# Patient Record
Sex: FEMALE | Race: WHITE | NOT HISPANIC OR LATINO | Employment: FULL TIME | URBAN - METROPOLITAN AREA
[De-identification: names, ages, dates, MRNs, and addresses within clinical notes are randomized per-mention and may not be internally consistent; named-entity substitution may affect disease eponyms.]

---

## 2017-07-19 ENCOUNTER — APPOINTMENT (OUTPATIENT)
Dept: WOMENS IMAGING | Facility: HOSPITAL | Age: 42
End: 2017-07-19

## 2017-07-19 PROCEDURE — 77063 BREAST TOMOSYNTHESIS BI: CPT | Performed by: RADIOLOGY

## 2017-07-19 PROCEDURE — 77067 SCR MAMMO BI INCL CAD: CPT | Performed by: RADIOLOGY

## 2018-07-30 ENCOUNTER — APPOINTMENT (OUTPATIENT)
Dept: WOMENS IMAGING | Facility: HOSPITAL | Age: 43
End: 2018-07-30

## 2018-07-30 PROCEDURE — 77067 SCR MAMMO BI INCL CAD: CPT | Performed by: RADIOLOGY

## 2018-07-30 PROCEDURE — 77063 BREAST TOMOSYNTHESIS BI: CPT | Performed by: RADIOLOGY

## 2019-04-11 ENCOUNTER — OFFICE (AMBULATORY)
Dept: URBAN - METROPOLITAN AREA CLINIC 64 | Facility: CLINIC | Age: 44
End: 2019-04-11

## 2019-04-11 VITALS
DIASTOLIC BLOOD PRESSURE: 78 MMHG | HEIGHT: 69 IN | SYSTOLIC BLOOD PRESSURE: 139 MMHG | HEART RATE: 119 BPM | WEIGHT: 209 LBS

## 2019-04-11 DIAGNOSIS — R16.0 HEPATOMEGALY, NOT ELSEWHERE CLASSIFIED: ICD-10-CM

## 2019-04-11 DIAGNOSIS — K74.69 OTHER CIRRHOSIS OF LIVER: ICD-10-CM

## 2019-04-11 PROCEDURE — 99203 OFFICE O/P NEW LOW 30 MIN: CPT | Performed by: INTERNAL MEDICINE

## 2019-05-07 ENCOUNTER — OFFICE (AMBULATORY)
Dept: URBAN - METROPOLITAN AREA CLINIC 64 | Facility: CLINIC | Age: 44
End: 2019-05-07

## 2019-05-07 VITALS
SYSTOLIC BLOOD PRESSURE: 122 MMHG | WEIGHT: 212 LBS | HEART RATE: 95 BPM | DIASTOLIC BLOOD PRESSURE: 73 MMHG | HEIGHT: 69 IN

## 2019-05-07 DIAGNOSIS — R94.5 ABNORMAL RESULTS OF LIVER FUNCTION STUDIES: ICD-10-CM

## 2019-05-07 DIAGNOSIS — R16.0 HEPATOMEGALY, NOT ELSEWHERE CLASSIFIED: ICD-10-CM

## 2019-05-07 DIAGNOSIS — K74.69 OTHER CIRRHOSIS OF LIVER: ICD-10-CM

## 2019-05-07 PROCEDURE — 99213 OFFICE O/P EST LOW 20 MIN: CPT | Performed by: INTERNAL MEDICINE

## 2019-08-02 ENCOUNTER — APPOINTMENT (OUTPATIENT)
Dept: WOMENS IMAGING | Facility: HOSPITAL | Age: 44
End: 2019-08-02

## 2019-08-02 PROCEDURE — 77067 SCR MAMMO BI INCL CAD: CPT | Performed by: RADIOLOGY

## 2019-08-02 PROCEDURE — 77063 BREAST TOMOSYNTHESIS BI: CPT | Performed by: RADIOLOGY

## 2020-08-17 ENCOUNTER — APPOINTMENT (OUTPATIENT)
Dept: WOMENS IMAGING | Facility: HOSPITAL | Age: 45
End: 2020-08-17

## 2020-08-17 PROCEDURE — 77063 BREAST TOMOSYNTHESIS BI: CPT | Performed by: RADIOLOGY

## 2020-08-17 PROCEDURE — 77067 SCR MAMMO BI INCL CAD: CPT | Performed by: RADIOLOGY

## 2021-08-23 ENCOUNTER — APPOINTMENT (OUTPATIENT)
Dept: WOMENS IMAGING | Facility: HOSPITAL | Age: 46
End: 2021-08-23

## 2021-08-23 PROCEDURE — 77063 BREAST TOMOSYNTHESIS BI: CPT | Performed by: RADIOLOGY

## 2021-08-23 PROCEDURE — 77067 SCR MAMMO BI INCL CAD: CPT | Performed by: RADIOLOGY

## 2022-02-17 ENCOUNTER — OFFICE (AMBULATORY)
Dept: URBAN - METROPOLITAN AREA CLINIC 64 | Facility: CLINIC | Age: 47
End: 2022-02-17

## 2022-02-17 VITALS
WEIGHT: 214 LBS | SYSTOLIC BLOOD PRESSURE: 112 MMHG | HEART RATE: 109 BPM | HEIGHT: 69 IN | DIASTOLIC BLOOD PRESSURE: 57 MMHG

## 2022-02-17 DIAGNOSIS — K62.5 HEMORRHAGE OF ANUS AND RECTUM: ICD-10-CM

## 2022-02-17 PROCEDURE — 99213 OFFICE O/P EST LOW 20 MIN: CPT | Performed by: NURSE PRACTITIONER

## 2022-04-06 ENCOUNTER — OFFICE (AMBULATORY)
Dept: URBAN - METROPOLITAN AREA PATHOLOGY 4 | Facility: PATHOLOGY | Age: 47
End: 2022-04-06
Payer: COMMERCIAL

## 2022-04-06 ENCOUNTER — ON CAMPUS - OUTPATIENT (AMBULATORY)
Dept: URBAN - METROPOLITAN AREA HOSPITAL 2 | Facility: HOSPITAL | Age: 47
End: 2022-04-06

## 2022-04-06 VITALS
SYSTOLIC BLOOD PRESSURE: 94 MMHG | DIASTOLIC BLOOD PRESSURE: 56 MMHG | TEMPERATURE: 98 F | DIASTOLIC BLOOD PRESSURE: 57 MMHG | HEART RATE: 93 BPM | HEART RATE: 95 BPM | SYSTOLIC BLOOD PRESSURE: 107 MMHG | HEART RATE: 106 BPM | OXYGEN SATURATION: 98 % | OXYGEN SATURATION: 99 % | DIASTOLIC BLOOD PRESSURE: 46 MMHG | HEART RATE: 94 BPM | SYSTOLIC BLOOD PRESSURE: 123 MMHG | WEIGHT: 215 LBS | SYSTOLIC BLOOD PRESSURE: 116 MMHG | OXYGEN SATURATION: 95 % | DIASTOLIC BLOOD PRESSURE: 67 MMHG | DIASTOLIC BLOOD PRESSURE: 55 MMHG | HEART RATE: 97 BPM | SYSTOLIC BLOOD PRESSURE: 110 MMHG | DIASTOLIC BLOOD PRESSURE: 75 MMHG | DIASTOLIC BLOOD PRESSURE: 66 MMHG | HEART RATE: 96 BPM | SYSTOLIC BLOOD PRESSURE: 90 MMHG | SYSTOLIC BLOOD PRESSURE: 152 MMHG | SYSTOLIC BLOOD PRESSURE: 121 MMHG | HEART RATE: 110 BPM | DIASTOLIC BLOOD PRESSURE: 72 MMHG | OXYGEN SATURATION: 97 % | SYSTOLIC BLOOD PRESSURE: 150 MMHG | HEART RATE: 100 BPM | SYSTOLIC BLOOD PRESSURE: 91 MMHG | DIASTOLIC BLOOD PRESSURE: 58 MMHG | RESPIRATION RATE: 16 BRPM | OXYGEN SATURATION: 100 % | OXYGEN SATURATION: 96 % | HEIGHT: 69 IN

## 2022-04-06 DIAGNOSIS — D12.3 BENIGN NEOPLASM OF TRANSVERSE COLON: ICD-10-CM

## 2022-04-06 DIAGNOSIS — R19.4 CHANGE IN BOWEL HABIT: ICD-10-CM

## 2022-04-06 DIAGNOSIS — K62.5 HEMORRHAGE OF ANUS AND RECTUM: ICD-10-CM

## 2022-04-06 DIAGNOSIS — C20 MALIGNANT NEOPLASM OF RECTUM: ICD-10-CM

## 2022-04-06 PROBLEM — D37.4 NEOPLASM OF UNCERTAIN BEHAVIOR OF COLON: Status: ACTIVE | Noted: 2022-04-06

## 2022-04-06 PROBLEM — K63.5 POLYP OF COLON: Status: ACTIVE | Noted: 2022-04-06

## 2022-04-06 PROBLEM — K56.609 UNSP INTESTNL OBST, UNSP AS TO PARTIAL VERSUS COMPLETE OBST: Status: ACTIVE | Noted: 2022-04-06

## 2022-04-06 LAB
GI HISTOLOGY: A. UNSPECIFIED: (no result)
GI HISTOLOGY: B. UNSPECIFIED: (no result)
GI HISTOLOGY: C. UNSPECIFIED: (no result)
GI HISTOLOGY: D. UNSPECIFIED: (no result)
GI HISTOLOGY: PDF REPORT: (no result)

## 2022-04-06 PROCEDURE — 88342 IMHCHEM/IMCYTCHM 1ST ANTB: CPT | Mod: 26 | Performed by: INTERNAL MEDICINE

## 2022-04-06 PROCEDURE — 45385 COLONOSCOPY W/LESION REMOVAL: CPT | Performed by: INTERNAL MEDICINE

## 2022-04-06 PROCEDURE — 45380 COLONOSCOPY AND BIOPSY: CPT | Mod: 59 | Performed by: INTERNAL MEDICINE

## 2022-04-06 PROCEDURE — 88341 IMHCHEM/IMCYTCHM EA ADD ANTB: CPT | Mod: 26 | Performed by: INTERNAL MEDICINE

## 2022-04-06 PROCEDURE — 88305 TISSUE EXAM BY PATHOLOGIST: CPT | Mod: 26 | Performed by: INTERNAL MEDICINE

## 2022-05-05 ENCOUNTER — OFFICE (AMBULATORY)
Dept: URBAN - METROPOLITAN AREA CLINIC 64 | Facility: CLINIC | Age: 47
End: 2022-05-05

## 2022-05-05 VITALS
HEART RATE: 114 BPM | WEIGHT: 218 LBS | SYSTOLIC BLOOD PRESSURE: 121 MMHG | DIASTOLIC BLOOD PRESSURE: 65 MMHG | HEIGHT: 69 IN

## 2022-05-05 DIAGNOSIS — K75.81 NONALCOHOLIC STEATOHEPATITIS (NASH): ICD-10-CM

## 2022-05-05 PROCEDURE — 99214 OFFICE O/P EST MOD 30 MIN: CPT | Performed by: INTERNAL MEDICINE

## 2022-05-17 RX ORDER — SORBITOL SOLUTION 70 %
15 SOLUTION, ORAL MISCELLANEOUS DAILY PRN
COMMUNITY
End: 2022-12-06

## 2022-05-17 RX ORDER — INSULIN DEGLUDEC 200 U/ML
80 INJECTION, SOLUTION SUBCUTANEOUS DAILY
COMMUNITY

## 2022-05-17 RX ORDER — FEXOFENADINE HYDROCHLORIDE 60 MG/1
60 TABLET, FILM COATED ORAL DAILY
COMMUNITY
End: 2022-09-28 | Stop reason: DRUGHIGH

## 2022-05-17 RX ORDER — DICYCLOMINE HYDROCHLORIDE 10 MG/1
10 CAPSULE ORAL
COMMUNITY
End: 2022-09-28 | Stop reason: DRUGHIGH

## 2022-05-17 RX ORDER — PHENTERMINE HYDROCHLORIDE 37.5 MG/1
37.5 TABLET ORAL
COMMUNITY
End: 2022-12-06

## 2022-05-17 RX ORDER — LEVOTHYROXINE AND LIOTHYRONINE 57; 13.5 UG/1; UG/1
180 TABLET ORAL DAILY
COMMUNITY
End: 2022-09-28 | Stop reason: DRUGHIGH

## 2022-05-17 RX ORDER — INSULIN LISPRO 100 [IU]/ML
30 INJECTION, SOLUTION INTRAVENOUS; SUBCUTANEOUS
COMMUNITY

## 2022-05-17 RX ORDER — TOPIRAMATE 25 MG/1
25 CAPSULE, COATED PELLETS ORAL 2 TIMES DAILY
COMMUNITY

## 2022-05-17 RX ORDER — BUPROPION HYDROCHLORIDE 150 MG/1
300 TABLET ORAL DAILY
COMMUNITY
End: 2022-09-28 | Stop reason: DRUGHIGH

## 2022-05-17 RX ORDER — CHOLECALCIFEROL (VITAMIN D3) 125 MCG
10 CAPSULE ORAL NIGHTLY
COMMUNITY
End: 2022-09-28 | Stop reason: DRUGHIGH

## 2022-05-17 RX ORDER — MAGNESIUM GLUCONATE 27 MG(500)
27 TABLET ORAL DAILY
COMMUNITY

## 2022-05-17 RX ORDER — LANOLIN ALCOHOL/MO/W.PET/CERES
1000 CREAM (GRAM) TOPICAL DAILY
COMMUNITY

## 2022-05-17 RX ORDER — LISINOPRIL 10 MG/1
10 TABLET ORAL DAILY
COMMUNITY
End: 2022-09-28 | Stop reason: DRUGHIGH

## 2022-05-17 RX ORDER — MULTIPLE VITAMINS W/ MINERALS TAB 9MG-400MCG
1 TAB ORAL DAILY
COMMUNITY
End: 2022-12-06

## 2022-05-17 RX ORDER — BACLOFEN 10 MG/1
10 TABLET ORAL 3 TIMES DAILY
COMMUNITY

## 2022-05-17 RX ORDER — RIZATRIPTAN BENZOATE 10 MG/1
10 TABLET ORAL ONCE AS NEEDED
COMMUNITY

## 2022-05-17 RX ORDER — PANTOPRAZOLE SODIUM 40 MG/1
40 TABLET, DELAYED RELEASE ORAL DAILY
COMMUNITY

## 2022-05-17 RX ORDER — ALPRAZOLAM 0.5 MG/1
0.5 TABLET ORAL 2 TIMES DAILY PRN
COMMUNITY

## 2022-05-17 RX ORDER — SEMAGLUTIDE 1.34 MG/ML
1 INJECTION, SOLUTION SUBCUTANEOUS WEEKLY
COMMUNITY

## 2022-05-17 RX ORDER — ACETAMINOPHEN AND CODEINE PHOSPHATE 120; 12 MG/5ML; MG/5ML
1 SOLUTION ORAL DAILY
COMMUNITY
End: 2022-12-06

## 2022-05-23 ENCOUNTER — HOSPITAL ENCOUNTER (OUTPATIENT)
Dept: INTERVENTIONAL RADIOLOGY/VASCULAR | Facility: HOSPITAL | Age: 47
Discharge: HOME | End: 2022-05-23
Admitting: RADIOLOGY

## 2022-05-23 VITALS
HEIGHT: 69 IN | TEMPERATURE: 98.8 F | BODY MASS INDEX: 29.92 KG/M2 | DIASTOLIC BLOOD PRESSURE: 76 MMHG | HEART RATE: 99 BPM | OXYGEN SATURATION: 98 % | SYSTOLIC BLOOD PRESSURE: 145 MMHG | RESPIRATION RATE: 18 BRPM | WEIGHT: 202 LBS

## 2022-05-23 DIAGNOSIS — Z45.2 ENCOUNTER FOR INSERTION OF VENOUS ACCESS PORT: ICD-10-CM

## 2022-05-23 LAB
APTT PPP: 26.3 SECONDS (ref 24–31)
B-HCG UR QL: NEGATIVE
BASOPHILS # BLD AUTO: 0 10*3/MM3 (ref 0–0.2)
BASOPHILS NFR BLD AUTO: 0.1 % (ref 0–1.5)
DEPRECATED RDW RBC AUTO: 38.9 FL (ref 37–54)
EOSINOPHIL # BLD AUTO: 0.2 10*3/MM3 (ref 0–0.4)
EOSINOPHIL NFR BLD AUTO: 1.8 % (ref 0.3–6.2)
ERYTHROCYTE [DISTWIDTH] IN BLOOD BY AUTOMATED COUNT: 13 % (ref 12.3–15.4)
HCT VFR BLD AUTO: 38.8 % (ref 34–46.6)
HGB BLD-MCNC: 13 G/DL (ref 12–15.9)
INR PPP: 0.99 (ref 0.93–1.1)
LYMPHOCYTES # BLD AUTO: 1.5 10*3/MM3 (ref 0.7–3.1)
LYMPHOCYTES NFR BLD AUTO: 16.9 % (ref 19.6–45.3)
MCH RBC QN AUTO: 28.9 PG (ref 26.6–33)
MCHC RBC AUTO-ENTMCNC: 33.5 G/DL (ref 31.5–35.7)
MCV RBC AUTO: 86.5 FL (ref 79–97)
MONOCYTES # BLD AUTO: 0.6 10*3/MM3 (ref 0.1–0.9)
MONOCYTES NFR BLD AUTO: 6.8 % (ref 5–12)
NEUTROPHILS NFR BLD AUTO: 6.4 10*3/MM3 (ref 1.7–7)
NEUTROPHILS NFR BLD AUTO: 74.4 % (ref 42.7–76)
NRBC BLD AUTO-RTO: 0 /100 WBC (ref 0–0.2)
PLATELET # BLD AUTO: 254 10*3/MM3 (ref 140–450)
PMV BLD AUTO: 9.3 FL (ref 6–12)
PROTHROMBIN TIME: 10.2 SECONDS (ref 9.6–11.7)
RBC # BLD AUTO: 4.49 10*6/MM3 (ref 3.77–5.28)
WBC NRBC COR # BLD: 8.6 10*3/MM3 (ref 3.4–10.8)

## 2022-05-23 PROCEDURE — C1894 INTRO/SHEATH, NON-LASER: HCPCS

## 2022-05-23 PROCEDURE — 99152 MOD SED SAME PHYS/QHP 5/>YRS: CPT

## 2022-05-23 PROCEDURE — 81025 URINE PREGNANCY TEST: CPT | Performed by: RADIOLOGY

## 2022-05-23 PROCEDURE — 85610 PROTHROMBIN TIME: CPT | Performed by: RADIOLOGY

## 2022-05-23 PROCEDURE — 25010000002 FENTANYL CITRATE (PF) 50 MCG/ML SOLUTION: Performed by: RADIOLOGY

## 2022-05-23 PROCEDURE — 99153 MOD SED SAME PHYS/QHP EA: CPT

## 2022-05-23 PROCEDURE — 76937 US GUIDE VASCULAR ACCESS: CPT

## 2022-05-23 PROCEDURE — C1769 GUIDE WIRE: HCPCS

## 2022-05-23 PROCEDURE — 85730 THROMBOPLASTIN TIME PARTIAL: CPT | Performed by: RADIOLOGY

## 2022-05-23 PROCEDURE — C1788 PORT, INDWELLING, IMP: HCPCS

## 2022-05-23 PROCEDURE — 25010000002 MIDAZOLAM PER 1 MG: Performed by: RADIOLOGY

## 2022-05-23 PROCEDURE — 85025 COMPLETE CBC W/AUTO DIFF WBC: CPT | Performed by: RADIOLOGY

## 2022-05-23 PROCEDURE — 77001 FLUOROGUIDE FOR VEIN DEVICE: CPT

## 2022-05-23 PROCEDURE — C1892 INTRO/SHEATH,FIXED,PEEL-AWAY: HCPCS

## 2022-05-23 PROCEDURE — 25010000002 ONDANSETRON PER 1 MG: Performed by: RADIOLOGY

## 2022-05-23 PROCEDURE — 25010000002 CEFAZOLIN PER 500 MG: Performed by: RADIOLOGY

## 2022-05-23 PROCEDURE — 25010000002 HEPARIN LOCK FLUSH PER 10 UNITS: Performed by: RADIOLOGY

## 2022-05-23 RX ORDER — SODIUM CHLORIDE 9 MG/ML
75 INJECTION, SOLUTION INTRAVENOUS CONTINUOUS
Status: DISCONTINUED | OUTPATIENT
Start: 2022-05-23 | End: 2022-05-24 | Stop reason: HOSPADM

## 2022-05-23 RX ORDER — LIDOCAINE HYDROCHLORIDE 20 MG/ML
INJECTION, SOLUTION INFILTRATION; PERINEURAL
Status: COMPLETED | OUTPATIENT
Start: 2022-05-23 | End: 2022-05-23

## 2022-05-23 RX ORDER — SODIUM CHLORIDE 0.9 % (FLUSH) 0.9 %
10 SYRINGE (ML) INJECTION AS NEEDED
Status: DISCONTINUED | OUTPATIENT
Start: 2022-05-23 | End: 2022-05-24 | Stop reason: HOSPADM

## 2022-05-23 RX ORDER — MIDAZOLAM HYDROCHLORIDE 1 MG/ML
INJECTION INTRAMUSCULAR; INTRAVENOUS
Status: COMPLETED | OUTPATIENT
Start: 2022-05-23 | End: 2022-05-23

## 2022-05-23 RX ORDER — SODIUM CHLORIDE 0.9 % (FLUSH) 0.9 %
10 SYRINGE (ML) INJECTION EVERY 12 HOURS SCHEDULED
Status: DISCONTINUED | OUTPATIENT
Start: 2022-05-23 | End: 2022-05-24 | Stop reason: HOSPADM

## 2022-05-23 RX ORDER — HEPARIN SODIUM (PORCINE) LOCK FLUSH IV SOLN 100 UNIT/ML 100 UNIT/ML
SOLUTION INTRAVENOUS
Status: COMPLETED | OUTPATIENT
Start: 2022-05-23 | End: 2022-05-23

## 2022-05-23 RX ORDER — LIDOCAINE HYDROCHLORIDE AND EPINEPHRINE 10; 10 MG/ML; UG/ML
INJECTION, SOLUTION INFILTRATION; PERINEURAL
Status: COMPLETED | OUTPATIENT
Start: 2022-05-23 | End: 2022-05-23

## 2022-05-23 RX ORDER — FENTANYL CITRATE 50 UG/ML
INJECTION, SOLUTION INTRAMUSCULAR; INTRAVENOUS
Status: COMPLETED | OUTPATIENT
Start: 2022-05-23 | End: 2022-05-23

## 2022-05-23 RX ORDER — ONDANSETRON 2 MG/ML
INJECTION INTRAMUSCULAR; INTRAVENOUS
Status: COMPLETED | OUTPATIENT
Start: 2022-05-23 | End: 2022-05-23

## 2022-05-23 RX ADMIN — LIDOCAINE HYDROCHLORIDE 7 ML: 20 INJECTION, SOLUTION INFILTRATION; PERINEURAL at 10:14

## 2022-05-23 RX ADMIN — HEPARIN SODIUM (PORCINE) LOCK FLUSH IV SOLN 100 UNIT/ML 500 UNITS: 100 SOLUTION at 10:31

## 2022-05-23 RX ADMIN — CEFAZOLIN SODIUM 2 G: 1 INJECTION, POWDER, FOR SOLUTION INTRAMUSCULAR; INTRAVENOUS at 09:30

## 2022-05-23 RX ADMIN — SODIUM CHLORIDE 75 ML/HR: 9 INJECTION, SOLUTION INTRAVENOUS at 08:47

## 2022-05-23 RX ADMIN — Medication 10 ML: at 08:46

## 2022-05-23 RX ADMIN — LIDOCAINE HYDROCHLORIDE 3 ML: 20 INJECTION, SOLUTION INFILTRATION; PERINEURAL at 10:09

## 2022-05-23 RX ADMIN — MIDAZOLAM 0.5 MG: 1 INJECTION INTRAMUSCULAR; INTRAVENOUS at 10:12

## 2022-05-23 RX ADMIN — MIDAZOLAM 1 MG: 1 INJECTION INTRAMUSCULAR; INTRAVENOUS at 10:07

## 2022-05-23 RX ADMIN — MIDAZOLAM 1 MG: 1 INJECTION INTRAMUSCULAR; INTRAVENOUS at 10:02

## 2022-05-23 RX ADMIN — ONDANSETRON 4 MG: 2 INJECTION INTRAMUSCULAR; INTRAVENOUS at 09:47

## 2022-05-23 RX ADMIN — FENTANYL CITRATE 50 MCG: 50 INJECTION, SOLUTION INTRAMUSCULAR; INTRAVENOUS at 10:12

## 2022-05-23 RX ADMIN — FENTANYL CITRATE 100 MCG: 50 INJECTION, SOLUTION INTRAMUSCULAR; INTRAVENOUS at 10:02

## 2022-05-23 RX ADMIN — LIDOCAINE HYDROCHLORIDE,EPINEPHRINE BITARTRATE 3 ML: 10; .01 INJECTION, SOLUTION INFILTRATION; PERINEURAL at 10:15

## 2022-05-23 RX ADMIN — FENTANYL CITRATE 50 MCG: 50 INJECTION, SOLUTION INTRAMUSCULAR; INTRAVENOUS at 10:07

## 2022-05-23 NOTE — POST-PROCEDURE NOTE
IR POST OP NOTE    Procedure:Port placement      Pre Op DX:Needs central venous access for chemotherapy      Post Op DX:same      Anesthesia: Local, CS      Findings:See dictation      Complications:No immediate      Provider Signature: Dr. Padilla Sethi

## 2022-05-23 NOTE — NURSING NOTE
Dermabond applied to right neck puncture site and right chest infusaport pocket incision site. Dry time of 5 minutes starts now.  
Dermabond is dry and intact to right neck and chest.  
Dr. Sethi has successful access to pt's right IJ vein, using ultrasound guidance, verified under fluoroscopy image guidance. Procedure continues.  
Dr. Sethi heparinized port per protocol; See MAR. Dr. Sethi has good blood return from port and it flushes easily. Dr. Sethi reports infusaport ready to use.   
Dr. Sethi is numbing area on pt's right chest.  
Dr. Sethi is suturing right neck puncture site and right chest incision site using dissolvable sutures. See MD dictation for procedural specific information.  
Dr. Sethi is using ultrasound guidance to numb area on pt's right neck.   
Dr. Sethi made incision in pt's right chest and is forming infusaport pocket.  
Dr. Sethi successfully placed an 8F SmartPort Plastic Implantable Port in pt's right chest, Lot # [id number]. Placement verified under fluoroscopy imaging.   
Pt being taken back to post op recovery, via stretcher, in stable condition on room air.  
Pt being taken over to IR lab D via stretcher for infusaport placement procedure.  
Pt brought to IR dept via stretcher, on room air, with face mask in place. Pt is alert and oriented x4. Pt is relaxed and cooperative. Pt skin is flesh, warm, and dry. Pt is on cardiac monitor. Pt was shown an example of an infusaport and was then educated on infusaport placement along with medications used for procedure and voiced understanding.   
Pt moved self back onto her stretcher laying semi-fowlers. Pt is stable on room air and remains on cardiac monitor.  
Pt moved self from stretcher onto IR procedure table into supine position. Pt remains on cardiac monitor and will be placed on 2L oxygen via N/C for conscious sedation procedure, once room air sat obtained. Pt has warm blanket for comfort and declined additional blanket.  
Pt's oxygenation dropped to 90% on 2L oxygen via N/C; oxygen increased to 4L via N/C.  
Pt's right neck and chest draped in sterile fashion.   
Right IJ vein confirmed patent by ROBERT Sweet RTR, using ultrasound guidance. Right neck and chest now being prepped in sterile fashion using chlorhexidine scrub.   
,

## 2022-06-14 ENCOUNTER — TRANSCRIBE ORDERS (OUTPATIENT)
Dept: PSYCHIATRY | Facility: CLINIC | Age: 47
End: 2022-06-14

## 2022-06-14 DIAGNOSIS — F41.1 GENERALIZED ANXIETY DISORDER: Primary | ICD-10-CM

## 2022-06-14 DIAGNOSIS — F43.21 SITUATIONAL DEPRESSION: ICD-10-CM

## 2022-07-20 PROCEDURE — 99283 EMERGENCY DEPT VISIT LOW MDM: CPT

## 2022-07-21 ENCOUNTER — HOSPITAL ENCOUNTER (EMERGENCY)
Facility: HOSPITAL | Age: 47
Discharge: HOME | End: 2022-07-21
Attending: EMERGENCY MEDICINE | Admitting: EMERGENCY MEDICINE

## 2022-07-21 ENCOUNTER — HOSPITAL ENCOUNTER (OUTPATIENT)
Dept: RADIATION ONCOLOGY | Facility: HOSPITAL | Age: 47
Setting detail: RADIATION/ONCOLOGY SERIES
End: 2022-07-21

## 2022-07-21 VITALS
DIASTOLIC BLOOD PRESSURE: 79 MMHG | OXYGEN SATURATION: 98 % | HEIGHT: 69 IN | TEMPERATURE: 98.2 F | BODY MASS INDEX: 33.67 KG/M2 | SYSTOLIC BLOOD PRESSURE: 168 MMHG | WEIGHT: 227.29 LBS | HEART RATE: 120 BPM | RESPIRATION RATE: 16 BRPM

## 2022-07-21 DIAGNOSIS — Z78.9 PROBLEM WITH VASCULAR ACCESS: Primary | ICD-10-CM

## 2022-07-21 RX ORDER — LIDOCAINE 40 MG/G
1 CREAM TOPICAL AS NEEDED
Status: DISCONTINUED | OUTPATIENT
Start: 2022-07-21 | End: 2022-07-21 | Stop reason: HOSPADM

## 2022-07-21 RX ADMIN — LIDOCAINE 4% 1 APPLICATION: 4 CREAM TOPICAL at 04:15

## 2022-07-21 NOTE — ED NOTES
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 She states that she took a nap after her port was accessed and the needle was mal-placed. She states she called the emergency hotline to ask what to do and she was told to come in to get a chest xray to make sure the chemo medication was not into the tissues or lungs and to have port re-accessed. She states that she has now been off of her continuous chemo for 5 hours now. C/o pain at the access site. Skin is normal around port site, no redness, swelling, warmth. Port flushes well and pulls back blood easily and without resistance.

## 2022-07-21 NOTE — ED PROVIDER NOTES
Subjective   History of Present Illness  Complication with port  47-year-old female states she had her port accessed with cancer care and was receiving a chemotherapy infusion at home and states she accidentally had some tugging on the line and noted that her needle was visible under the clear covering and had retracted from the skin a bit.  She was advised to come to the ER to have it replaced.  Review of Systems   Constitutional: Negative.    HENT: Negative.    Cardiovascular: Negative.    Skin: Negative.         Past Medical History:   Diagnosis Date   • Adenocarcinoma of rectum (HCC)    • Adenoma of liver    • Alopecia    • Anxiety    • Diabetes mellitus (HCC)    • Disease of thyroid gland    • GERD (gastroesophageal reflux disease)    • Graves disease    • Hyperlipidemia    • Hypertension    • IBS (irritable bowel syndrome)        No Known Allergies    Past Surgical History:   Procedure Laterality Date   • COLONOSCOPY     • LAPAROSCOPIC OOPHORECTOMY Bilateral        No family history on file.    Social History     Socioeconomic History   • Marital status:    Tobacco Use   • Smoking status: Never Smoker   • Smokeless tobacco: Never Used   Vaping Use   • Vaping Use: Never used   Substance and Sexual Activity   • Alcohol use: Yes     Alcohol/week: 2.0 standard drinks     Types: 2 Drinks containing 0.5 oz of alcohol per week   • Sexual activity: Defer       Prior to Admission medications    Medication Sig Start Date End Date Taking? Authorizing Provider   ALPRAZolam (XANAX) 0.5 MG tablet Take 0.5 mg by mouth 2 (Two) Times a Day As Needed for Anxiety.    ProviderKady MD   baclofen (LIORESAL) 10 MG tablet Take 10 mg by mouth 3 (Three) Times a Day.    Kady Montemayor MD   buPROPion XL (WELLBUTRIN XL) 150 MG 24 hr tablet Take 300 mg by mouth Daily.    Kady Montemayor MD   dicyclomine (BENTYL) 10 MG capsule Take 10 mg by mouth 4 (Four) Times a Day Before Meals & at Bedtime.    Provider  MD Kady   fexofenadine (ALLEGRA) 60 MG tablet Take 60 mg by mouth Daily.    Kady Montemayor MD   Insulin Degludec (Tresiba FlexTouch) 200 UNIT/ML solution pen-injector pen injection Inject 80 Units under the skin into the appropriate area as directed Daily.    Kady Montemayor MD   Insulin Lispro (humaLOG) 100 UNIT/ML injection Inject 30 Units under the skin into the appropriate area as directed 3 (Three) Times a Day Before Meals.    Kady Montemayor MD   lisinopril (PRINIVIL,ZESTRIL) 10 MG tablet Take 10 mg by mouth Daily.    Kady Montemayor MD   magnesium gluconate (MAGONATE) 500 MG tablet Take 27 mg by mouth Daily.    Kady Montemayor MD   melatonin 5 MG tablet tablet Take 10 mg by mouth Every Night.    Kady Montemayor MD   multivitamin with minerals tablet tablet Take 1 tablet by mouth Daily.    Kady Montemayor MD   Naltrexone HCl (DEPADE PO) Take 4 mg by mouth Every Night.    Kady Montemayor MD   norethindrone (MICRONOR) 0.35 MG tablet Take 1 tablet by mouth Daily.    Kady Montemayor MD   pantoprazole (PROTONIX) 40 MG EC tablet Take 40 mg by mouth Daily.    Kady Montemayor MD   phentermine (ADIPEX-P) 37.5 MG tablet Take 37.5 mg by mouth Every Morning Before Breakfast.    Kady Montemayor MD   rizatriptan (MAXALT) 10 MG tablet Take 10 mg by mouth 1 (One) Time As Needed for Migraine. May repeat in 2 hours if needed    Kady Montemayor MD   Semaglutide, 1 MG/DOSE, (Ozempic, 1 MG/DOSE,) 2 MG/1.5ML solution pen-injector Inject 1 mg under the skin into the appropriate area as directed 1 (One) Time Per Week.    Kady Montemayor MD   sorbitol 70 % solution Take 15 mL by mouth Daily As Needed.    Kady Montemayor MD   Thyroid 90 MG PO tablet Take 180 mg by mouth Daily.    Kady Montemayor MD   topiramate (TOPAMAX) 25 MG capsule (sprinkle) Take 25 mg by mouth 2 (Two) Times a Day.    Kady Montemayor MD   vitamin B-12  "(CYANOCOBALAMIN) 1000 MCG tablet Take 1,000 mcg by mouth Daily.    Provider, MD Kady   vitamin D3 125 MCG (5000 UT) capsule capsule Take 5,000 Units by mouth Daily.    Provider, MD Kady     /79 (BP Location: Left arm, Patient Position: Sitting)   Pulse 120   Temp 98.2 °F (36.8 °C)   Resp 18   Ht 175.3 cm (69\")   Wt 103 kg (227 lb 4.7 oz)   LMP 06/27/2022   SpO2 98%   BMI 33.57 kg/m²   I examined the patient using the appropriate personal protective equipment.        Objective   Physical Exam  General: Well-appearing, no acute distress  Psych: Oriented, pleasant affect  Respirations: Clear, nonlabored respirations  Skin of the right upper chest has clear covering over a accessed port and the needle is protruding from the skin about a centimeter underneath the sterile covering, there is no skin swelling or erythema or any suggestion of induration or infiltration or any secondary complication  Procedures           ED Course                                           MDM  Patient's port access showed no signs of infiltration or infection.  Given the retraction of the needle it was felt prudent to have it deaccessed and reaccessed with a fresh needle and the ER nurse did perform this through standard sterile technique.  There were no immediate complications and patient will be discharged for ongoing follow-up with her cancer care.  Final diagnoses:   Problem with vascular access       ED Disposition  ED Disposition     ED Disposition   Discharge    Condition   Stable    Comment   --             No follow-up provider specified.       Medication List      No changes were made to your prescriptions during this visit.          Zan Gallegos MD  07/21/22 0444    "

## 2022-07-25 ENCOUNTER — CONSULT (OUTPATIENT)
Dept: RADIATION ONCOLOGY | Facility: HOSPITAL | Age: 47
End: 2022-07-25

## 2022-07-25 VITALS
OXYGEN SATURATION: 98 % | BODY MASS INDEX: 33.12 KG/M2 | TEMPERATURE: 96.9 F | WEIGHT: 223.6 LBS | DIASTOLIC BLOOD PRESSURE: 86 MMHG | RESPIRATION RATE: 20 BRPM | SYSTOLIC BLOOD PRESSURE: 138 MMHG | HEART RATE: 103 BPM | HEIGHT: 69 IN

## 2022-07-25 DIAGNOSIS — C20 RECTAL CANCER: Primary | ICD-10-CM

## 2022-07-25 PROCEDURE — 99205 OFFICE O/P NEW HI 60 MIN: CPT | Performed by: RADIOLOGY

## 2022-07-25 PROCEDURE — G0463 HOSPITAL OUTPT CLINIC VISIT: HCPCS

## 2022-07-25 RX ORDER — ASCORBIC ACID 500 MG
500 TABLET ORAL DAILY
COMMUNITY
End: 2022-12-06

## 2022-07-25 RX ORDER — GABAPENTIN 300 MG/1
300 CAPSULE ORAL 3 TIMES DAILY
COMMUNITY
End: 2022-09-28

## 2022-07-25 NOTE — PROGRESS NOTES
RADIATION THERAPY CONSULT NOTE    NAME: Yun Mcgowan  YOB: 1975  MRN #: [id number]  DATE OF SERVICE: 7/25/2022  REFERRING PROVIDER: Dr. Frances  PRIMARY CARE PROVIDER: Anusha Lang MD    DIAGNOSIS:   Encounter Diagnosis   Name Primary?   • Rectal cancer (HCC) Yes       REASON FOR CONSULTATION/CHIEF COMPLAINT:   I was asked to see the patient at the request of the referring provider noted below for advice and recommendations regarding this diagnosis and the role of radiation therapy.                                RECORDS OBTAINED:  Records of the patients history including those obtained from the referring provider were reviewed and summarized in detail.      WORKUP AND TREATMENT COURSE:  Oncology/Hematology History   Rectal cancer (HCC)   4/6/2022 Initial Diagnosis    Rectal cancer (HCC)     4/6/2022 Procedure    Colonoscopy    MMR intact     4/6/2022 Imaging    CT Chest/Abd/Pel  No pulmonary mets  Cirrhotic liver, 4.2 x 4.8cm hypodense exophytic lesion along right inferior hepatic lobe (unchanged), similar exophytic 4.3 cm lesion along left lateral hepatic segment (unchanged),  7mm clementina-rectal LN  6.6cm left adnexal cyst     4/20/2022 Imaging    MRI Pelvis  FINDINGS: There is a circumferential soft tissue mass encircling the posterior mid rectum from the 1:00 to 10:00 position, approximately 8.4 cm above the anal verge. Craniocaudal extent of the mass measures approximately 4.8 cm. The mass invades through the muscularis propria and into the mesorectal fat along its left posterior lateral border as well as likely along its right lateral border. Tumor extends approximately 3 mm beyond the muscularis propria.. The tumor does not reach the mesorectal fascia.     There are at least 3 greater than 5 mm left-sided perirectal lymph nodes and 2 right-sided greater than 5 mm perirectal lymph node seen. These lymph nodes demonstrate enhancement on post gadolinium images with questionable mild  restricted diffusion. No distant adenopathy is seen.     Urinary bladder and uterus appear free of disease.     There is a 5.9 cm cystic lesion in the left adnexa. Left ovary appears otherwise within normal limits. Right ovary is not definitely visualized.       5/24/2022 -  Chemotherapy    FOLFOX  Cycle 4 given 7/6/22         Cancer Staging  Rectal cancer (HCC)  Staging form: Colon And Rectum, AJCC 8th Edition  - Clinical: Stage IIIB (cT3, cN2a, cM0) - Unsigned      HISTORY OF PRESENT ILLNESS:    Yun Mcgowan is a 47 y.o. female with rectal adenocarcinoma.  She has a history of liver cirrhosis which has been followed by GI/hepatology and she has had hepatic adenoma ablation performed in 2019 and microwave ablation in 2020.  She began having rectal bleeding and was sent for colonoscopy 4/6/22 during which was found an ulcerated non-bleeding 5cm malignant appearing mass in the distal rectum between 6-8 cm from anus. Biopsy c/w moderately differentiated adenocarcinoma.  No mets found on staging workup.  She saw colorectal surgery and was recommended to have total neoadjuvant treatment prior to surgical resection.  Cycle 4 FOLOX given 7/6/22.  Here to discuss concurrent chemoRT as part of her JESSICA.      Current symptoms include:  Mild distal extremity paresthesias  No abd/pel pain, nausea/vomiting  Denies diarrhea  Rectal bleeding has stopped ~1 month after start of chemo    The following portions of the patient's history were reviewed and updated as appropriate: allergies, current medications, past family history, past medical history, past social history, past surgical history and problem list. Reviewed with the patient and remain unchanged.      PREVIOUS RADIOTHERAPY OR CHEMOTHERAPY: FOLFOX x4 cycles    HISTORY OF AUTOIMMUNE DISEASE: none    PAST MEDICAL HISTORY:    she  has a past medical history of Adenocarcinoma of rectum (HCC), Adenoma of liver, Alopecia, Anxiety, Diabetes mellitus (HCC), Disease of thyroid  gland, GERD (gastroesophageal reflux disease), Graves disease, Hyperlipidemia, Hypertension, and IBS (irritable bowel syndrome).    MEDICATIONS:     Current Outpatient Medications:   •  ALPRAZolam (XANAX) 0.5 MG tablet, Take 0.5 mg by mouth 2 (Two) Times a Day As Needed for Anxiety., Disp: , Rfl:   •  baclofen (LIORESAL) 10 MG tablet, Take 10 mg by mouth 3 (Three) Times a Day., Disp: , Rfl:   •  buPROPion XL (WELLBUTRIN XL) 150 MG 24 hr tablet, Take 300 mg by mouth Daily., Disp: , Rfl:   •  dicyclomine (BENTYL) 10 MG capsule, Take 10 mg by mouth 4 (Four) Times a Day Before Meals & at Bedtime., Disp: , Rfl:   •  fexofenadine (ALLEGRA) 60 MG tablet, Take 60 mg by mouth Daily., Disp: , Rfl:   •  Insulin Degludec (Tresiba FlexTouch) 200 UNIT/ML solution pen-injector pen injection, Inject 80 Units under the skin into the appropriate area as directed Daily., Disp: , Rfl:   •  Insulin Lispro (humaLOG) 100 UNIT/ML injection, Inject 30 Units under the skin into the appropriate area as directed 3 (Three) Times a Day Before Meals., Disp: , Rfl:   •  lisinopril (PRINIVIL,ZESTRIL) 10 MG tablet, Take 10 mg by mouth Daily., Disp: , Rfl:   •  magnesium gluconate (MAGONATE) 500 MG tablet, Take 27 mg by mouth Daily., Disp: , Rfl:   •  melatonin 5 MG tablet tablet, Take 10 mg by mouth Every Night., Disp: , Rfl:   •  multivitamin with minerals tablet tablet, Take 1 tablet by mouth Daily., Disp: , Rfl:   •  Naltrexone HCl (DEPADE PO), Take 4 mg by mouth Every Night., Disp: , Rfl:   •  norethindrone (MICRONOR) 0.35 MG tablet, Take 1 tablet by mouth Daily., Disp: , Rfl:   •  pantoprazole (PROTONIX) 40 MG EC tablet, Take 40 mg by mouth Daily., Disp: , Rfl:   •  phentermine (ADIPEX-P) 37.5 MG tablet, Take 37.5 mg by mouth Every Morning Before Breakfast., Disp: , Rfl:   •  rizatriptan (MAXALT) 10 MG tablet, Take 10 mg by mouth 1 (One) Time As Needed for Migraine. May repeat in 2 hours if needed, Disp: , Rfl:   •  Semaglutide, 1 MG/DOSE,  "(Ozempic, 1 MG/DOSE,) 2 MG/1.5ML solution pen-injector, Inject 1 mg under the skin into the appropriate area as directed 1 (One) Time Per Week., Disp: , Rfl:   •  sorbitol 70 % solution, Take 15 mL by mouth Daily As Needed., Disp: , Rfl:   •  Thyroid 90 MG PO tablet, Take 180 mg by mouth Daily., Disp: , Rfl:   •  topiramate (TOPAMAX) 25 MG capsule (sprinkle), Take 25 mg by mouth 2 (Two) Times a Day., Disp: , Rfl:   •  vitamin B-12 (CYANOCOBALAMIN) 1000 MCG tablet, Take 1,000 mcg by mouth Daily., Disp: , Rfl:   •  vitamin D3 125 MCG (5000 UT) capsule capsule, Take 5,000 Units by mouth Daily., Disp: , Rfl:     ALLERGIES:   No Known Allergies    PAST SURGICAL HISTORY:   she has a past surgical history that includes Colonoscopy and Laparoscopic oophorectomy (Bilateral).    FAMILY HISTORY:   her family history is not on file.    SOCIAL HISTORY:   she  reports that she has never smoked. She has never used smokeless tobacco. She reports current alcohol use of about 2.0 standard drinks of alcohol per week.    PAIN AND PAIN MANAGEMENT:   Vitals:    07/25/22 1307   BP: 138/86   Pulse: 103   Resp: 20   Temp: 96.9 °F (36.1 °C)   TempSrc: Temporal   SpO2: 98%   Weight: 101 kg (223 lb 9.6 oz)   Height: 175.3 cm (69\")   PainSc: 0-No pain  Comment: Neuropathy in hands when she touches cold things         NUTRITIONAL STATUS:  Otherwise no issues    PHQ-9:         REVIEW OF SYSTEMS:   Review of Systems   As in HPI  The remainder of the 14 point ROS has been reviewed and is otherwise negative.    Objective     KPS: 100: Normal; no evidence of disease  ECOG: (0) Fully active, able to carry on all predisease performance without restriction     VITAL SIGNS:   Vitals:    07/25/22 1307   BP: 138/86   Pulse: 103   Resp: 20   Temp: 96.9 °F (36.1 °C)   TempSrc: Temporal   SpO2: 98%   Weight: 101 kg (223 lb 9.6 oz)   Height: 175.3 cm (69\")   PainSc: 0-No pain  Comment: Neuropathy in hands when she touches cold things       PHYSICAL EXAM: "   GENERAL:  A&O x3, NAD    HEENT:  NC/AT. Pupils equally round. Sclera anicteric.    NECK:  Supple, no masses.  LYMPHATIC:  No cervical, supraclavicular, inguinal LAD   CARDIOVASCULAR:  nl S1 & S2, RRR, no m/r/g  CHEST:  CTAB  ABDOMEN:  soft, NTND, (+) bowel sounds, no HSM   MUSCULOSKELETAL:  No spinal or other bony tenderness to firm palpation  EXTREMITIES:  No clubbing, cyanosis, edema.  SKIN:  No erythema, rashes, ulcerations noted.   NEUROLOGIC:  Cranial nerves II-XII grossly intact bilaterally. No focal neurologic deficits.  PSYCHIATRIC:  nl mood, affect, judgement  RECTAL EXAMINATION: nl sphincter tone, small mildly fungating mass palpable at tip of examining finger in posterior and left lateral rectal wall, no bleeding      PATHOLOGY (Reviewed): as above      LABS (Reviewed):  HEMATOLOGY:  WBC   Date Value Ref Range Status   05/23/2022 8.60 3.40 - 10.80 10*3/mm3 Final   11/07/2020 10.30 4.5 - 11.0 10*3/uL Final     RBC   Date Value Ref Range Status   05/23/2022 4.49 3.77 - 5.28 10*6/mm3 Final   11/07/2020 4.17 4.0 - 5.2 10*6/uL Final     Hemoglobin   Date Value Ref Range Status   05/23/2022 13.0 12.0 - 15.9 g/dL Final   11/07/2020 12.4 12.0 - 16.0 g/dL Final     Hematocrit   Date Value Ref Range Status   05/23/2022 38.8 34.0 - 46.6 % Final   11/07/2020 36.8 36.0 - 46.0 % Final     Platelets   Date Value Ref Range Status   05/23/2022 254 140 - 450 10*3/mm3 Final   11/07/2020 233 140 - 440 10*3/uL Final     CHEMISTRY:  No results found for: GLUCOSE, BUN, CREATININE, EGFRIFNONA, EGFRIFAFRI, BCR, K, CO2, CALCIUM, PROTENTOTREF, ALBUMIN, LABIL2, BILIRUBIN, AST, ALT      IMAGING (Reviewed):   Outside imaging results reviewed    I have personally reviewed and interpretted the relevant radiographic images.  Mid to high rectal mass with small surrounding clementina-rectal LNs    Assessment & Plan     ASSESSMENT AND PLAN:    48yo with locally advanced rectal adenocarcinoma  Currently undergoing JESSICA with FOLFOX (8 cycles  planned Q2 weeks) to be followed by concurrent 5/FU or Xeloda with radiation prior to surgical resection.  Last cycle of FOLFOX planned 8/31-9/2  Plan to start radiation 2 weeks after likely week of 9/19/22 with CT simulation ~1 week prior.    I discussed the logistics of planning and delivering radiation treatments along with anticipated potential acute and chronic toxicities including but not limited to: Fatigue, skin erythema, desquamation, nausea, vomiting, diarrhea, dysuria, long-term risks of fibrosis, adhesions, bowel obstruction, fistula formation, vaginal stenosis, and risk of secondary malignancy.    Questions answered to patient's satisfaction and they wish to proceed as planned.  CT simulation scheduled near to anticipated radiation start date    Thank-you for allowing me to participate in the care of this patient and please do not hesitate to contact me for any questions or concerns.    This assessment comes from my review of the imaging, pathology, physician notes and other pertinent information as mentioned.      Orders:  CT Simulation  Radiation Treatment Planning      Smoking Cessation: N/A      Yun SOPHIA Mcgowan reports a pain score of 0.  Given her pain assessment as noted, treatment options were discussed and the following options were decided upon as a follow-up plan to address the patient's pain: N/A.          COORDINATION OF CARE: A copy of this note is sent to the referring provider.      MEDICAL DECISION MAKING:    Number/Complexity of Problems Addressed  [] 1 self-limited of minor problem (42453/65473)  [] >=2 self-limited or minor problems, 1 stable chronic illness, 1 acute/uncomplicated illness/injury (50858/71449)  [x]Chronic illnesses with exacerbation/progression/side effects of treatment, >=2 stable chronic illnesses, 1 undiagnosed new problem with uncertain prognosis, 1 acute illness with systemic symptoms, 1 acute complicated injury (88610/87261)  []Chronic illnesses with severe  exacerbation/progression/treatment side effects, acute or chronic illness or injury that poses a threat to life or bodily function (99205/99215)    Amount and/or Complexity of Data Reviewed  [] Minimal or none (99202/69880)  [] Limited - meets 1/2 categories (99203/99213)  1. At least 2 of: review of prior external notes from each unique source, review results of each unique test, ordering of each unique test  2. Assessment requiring an independent historian  [] Moderate - meets 1/3 categories (99204/33389)  1. Any 3 of: Review of prior external notes from each unique source, review results of each unique test, ordering of each unique test, assessment requiring an independent historian  2. Independent interpretation of tests  3. Discussion of management or test interpretation  [x] Extensive - meets 2/3 categories (99205/99215)  1. Any 3 of: Review of prior external notes from each unique source, review results of each unique test, ordering of each unique test, assessment requiring an independent historian  2. Independent interpretation of tests  3. Discussion of management or test interpretation    Risk of complications and/or morbidity/mortality of patient management  [] Minimal (99202/85941)  [] Low (05063/72005)  [] Moderate (43389/16703)  Examples: Rx drug management, decision regarding minor surgery with identified patient or procedure risk factors, decision regarding elective major surgery without identified patient or procedure risk factors, diagnosis or treatment significantly limited by social determinants of health  [x] High (99205/99215)  Examples: drug therapy requiring intensive monitoring for toxicity, decision regarding elective major surgery with identified patient/procedure risk factors, decision regarding emergency major surgery, decision regarding hospitalization, decision for DNR or de-escalation of care because of poor prognosis    LEVEL OF MDM (based on 2/3 above categories)  [] Straightforward  (83014/76649)  [] Low (46004/08189)  [] Moderate (56727/08800)  [x] High (48061/88323)      CC: No ref. provider found Anusha Lang MD Robert Bryan Barriger, MD  7/25/2022  1:17 PM EDT

## 2022-07-27 ENCOUNTER — TELEPHONE (OUTPATIENT)
Dept: ONCOLOGY | Facility: CLINIC | Age: 47
End: 2022-07-27

## 2022-07-27 NOTE — TELEPHONE ENCOUNTER
Distress Screening Follow-up     Diagnosis:   Rectal Cancer    Comments: OSW placed call to patient regarding DST. Patient reports she’s doing ‘good.’ Patient’s only request was for cancer support groups - OSW emailed to patient.       Interventions: Support group information     Location of Distress Screening: [facility]      Distress Level: 5 (7/25/2022 ; 1300)     Physical Concerns:      Appearance: N  Bathing/Dressing: N  Breathing: N  Changes in urination: N  Changes in Appearance: N  Constipation: N  Diarrhea: N  Eating: N  Fatigue: N  Feeling swollen: N  Fevers: N  Getting around: N  Indigestion: N  Loss/change of physical abilities: N  Memory/Concentration: N  Mouth sores: N  Nausea: N  Nose dry/congested: N  Pain: Y  Sexual: N  Skin dry/itchy: N  Sleep: N  Substance abuse: N  Tingling hands/feet: N     Practical Problems:      : N  Food: N  Housing: N  Insurance/Financial: N  Access to Medications: N  Transportation: N  Work/School: N  Treatment decisions: N  Taking care of myself: Y  Taking care of others: Y    Practical Concerns Comments:      Emotional Concerns:      Feelings of worthlessness/burden: N  Fear: Y  Grief/Loss: N  Sadness/Depression: N  Worry/Anxiety: Y  Loss of Interest/Activities: N  Lonely: N  Anger: N     Family Concerns:      Dealing with children: N  Dealing with partner: N  Ability to have children: N  Family health issues: N  Family Concerns Comment: No     Spiritual Concerns:      Spiritual/Scientology Comments:

## 2022-08-02 ENCOUNTER — OFFICE (AMBULATORY)
Dept: URBAN - METROPOLITAN AREA CLINIC 64 | Facility: CLINIC | Age: 47
End: 2022-08-02
Payer: COMMERCIAL

## 2022-08-02 VITALS
HEART RATE: 102 BPM | DIASTOLIC BLOOD PRESSURE: 67 MMHG | WEIGHT: 224 LBS | SYSTOLIC BLOOD PRESSURE: 111 MMHG | HEIGHT: 69 IN

## 2022-08-02 DIAGNOSIS — K75.81 NONALCOHOLIC STEATOHEPATITIS (NASH): ICD-10-CM

## 2022-08-02 DIAGNOSIS — C20 MALIGNANT NEOPLASM OF RECTUM: ICD-10-CM

## 2022-08-02 PROCEDURE — 99214 OFFICE O/P EST MOD 30 MIN: CPT | Performed by: INTERNAL MEDICINE

## 2022-08-08 ENCOUNTER — DOCUMENTATION (OUTPATIENT)
Dept: ONCOLOGY | Facility: CLINIC | Age: 47
End: 2022-08-08

## 2022-08-08 NOTE — PROGRESS NOTES
"                 Nutrition Assessment  Yun Mcgowan    Anthropometrics  Height: 5'9\"  Weight: 222.6# (7/25/22)  BMI: 33  Weight Hx:   Wt Readings from Last 20 Encounters:   07/25/22 101 kg (223 lb 9.6 oz)   07/20/22 103 kg (227 lb 4.7 oz)   05/23/22 91.6 kg (202 lb)     IBW: 145# (153.5%)  UBW: 215# (103.5%)    Nutrition Questionnaire    Food Allergies: Pt denied allergies at this time    Chewing/Swallowing Problems: Pt denied chewing and swallowing problems at this time.    Who does the cooking & shopping: Pt's     Nausea/Vomiting/Diarrhea: Pt denied n/v/d/c    Appetite: (poor) 1 2 3 4 5 6 7 8 9 10 (excellent):8    24-Hour Diet Recall:   Breakfast - two scrambled eggs, one waffle (box mix), sugar-free syrup, water  Lunch - nothing due to late breakfast  Dinner - baked chicken, green beans, small potatoes  Snack - nothing  Water ~64 oz/day (water, Gatorade Zero, Propel Zero)    Discussion: I called the pt per referral from NARCISO Dee, for diabetes education. Pt with insulin-dependent diabetes and recently diagnosed with colorectal cancer. Pt requested help on managing blood sugar while getting chemo, stating the chemo has been increasing her blood sugar. Pt said her blood sugar has been running in the 300s. Pt's insulin: Humalog, Tresiba, Ozempic. Pt supplementing with Benefiber and Miralax r/t cancer dx.    Pt said she's been diabetic for 20+ years, but has never received formal diabetes diet education. Pt said she has an appointment scheduled at the [facility] in the next couple of weeks, I encouraged her to keep her appointment, assuring her that they will be able to support her with all her diabetes needs, pt was happy to hear this.    I briefly discussed with her blood sugar targets in the morning and after meals, pt said she'd never been informed of this. Pt said she checks her blood sugar ~5x/day. I recommended she try to have three balanced meals/day and to adjust carbohydrates in her " meals according to her blood sugar response. Pt admitted she doesn't usually pay attention to any patterns with her diet and what her blood sugar does, I recommended she start to pay attention closey, explaining that this is the best way to understand her body and how it responds to her diet and insulin management, pt said she would.    Pt was kind and appreciative of today's call. All questions and concerns were addressed and answered. I provided my contact information and encouraged her to call or schedule an appointment as needed.    Kristin King, MS,RD,LD-[place],CD-[place]  Registered Dietitian

## 2022-08-26 ENCOUNTER — APPOINTMENT (OUTPATIENT)
Dept: WOMENS IMAGING | Facility: HOSPITAL | Age: 47
End: 2022-08-26

## 2022-08-26 PROCEDURE — 77063 BREAST TOMOSYNTHESIS BI: CPT | Performed by: RADIOLOGY

## 2022-08-26 PROCEDURE — 77067 SCR MAMMO BI INCL CAD: CPT | Performed by: RADIOLOGY

## 2022-09-06 ENCOUNTER — ON CAMPUS - OUTPATIENT (AMBULATORY)
Dept: URBAN - METROPOLITAN AREA HOSPITAL 2 | Facility: HOSPITAL | Age: 47
End: 2022-09-06
Payer: COMMERCIAL

## 2022-09-06 VITALS
SYSTOLIC BLOOD PRESSURE: 151 MMHG | HEART RATE: 100 BPM | DIASTOLIC BLOOD PRESSURE: 89 MMHG | DIASTOLIC BLOOD PRESSURE: 78 MMHG | OXYGEN SATURATION: 98 % | SYSTOLIC BLOOD PRESSURE: 160 MMHG | OXYGEN SATURATION: 99 % | RESPIRATION RATE: 21 BRPM | SYSTOLIC BLOOD PRESSURE: 188 MMHG | TEMPERATURE: 97.3 F | DIASTOLIC BLOOD PRESSURE: 80 MMHG | DIASTOLIC BLOOD PRESSURE: 85 MMHG | WEIGHT: 221 LBS | SYSTOLIC BLOOD PRESSURE: 136 MMHG | SYSTOLIC BLOOD PRESSURE: 129 MMHG | DIASTOLIC BLOOD PRESSURE: 69 MMHG | HEART RATE: 101 BPM | HEART RATE: 112 BPM | OXYGEN SATURATION: 96 % | RESPIRATION RATE: 15 BRPM | OXYGEN SATURATION: 97 % | HEART RATE: 109 BPM | RESPIRATION RATE: 16 BRPM | DIASTOLIC BLOOD PRESSURE: 76 MMHG | HEART RATE: 102 BPM | SYSTOLIC BLOOD PRESSURE: 138 MMHG | HEIGHT: 69 IN

## 2022-09-06 DIAGNOSIS — K74.69 OTHER CIRRHOSIS OF LIVER: ICD-10-CM

## 2022-09-06 DIAGNOSIS — T18.2XXA FOREIGN BODY IN STOMACH, INITIAL ENCOUNTER: ICD-10-CM

## 2022-09-06 DIAGNOSIS — K75.81 NONALCOHOLIC STEATOHEPATITIS (NASH): ICD-10-CM

## 2022-09-06 PROCEDURE — 43235 EGD DIAGNOSTIC BRUSH WASH: CPT | Performed by: INTERNAL MEDICINE

## 2022-09-07 ENCOUNTER — HOSPITAL ENCOUNTER (OUTPATIENT)
Dept: RADIATION ONCOLOGY | Facility: HOSPITAL | Age: 47
Setting detail: RADIATION/ONCOLOGY SERIES
End: 2022-09-07

## 2022-09-07 PROCEDURE — 77263 THER RADIOLOGY TX PLNG CPLX: CPT | Performed by: RADIOLOGY

## 2022-09-07 PROCEDURE — 77334 RADIATION TREATMENT AID(S): CPT | Performed by: RADIOLOGY

## 2022-09-07 PROCEDURE — 77470 SPECIAL RADIATION TREATMENT: CPT | Performed by: RADIOLOGY

## 2022-09-08 ENCOUNTER — LAB (OUTPATIENT)
Dept: LAB | Facility: HOSPITAL | Age: 47
End: 2022-09-08

## 2022-09-08 DIAGNOSIS — C20 RECTAL CANCER: ICD-10-CM

## 2022-09-08 DIAGNOSIS — C20 RECTAL CANCER: Primary | ICD-10-CM

## 2022-09-08 LAB — B-HCG UR QL: NEGATIVE

## 2022-09-08 PROCEDURE — FACE2FACE: Performed by: RADIOLOGY

## 2022-09-08 PROCEDURE — 81025 URINE PREGNANCY TEST: CPT

## 2022-09-08 PROCEDURE — 77334 RADIATION TREATMENT AID(S): CPT | Performed by: RADIOLOGY

## 2022-09-09 PROCEDURE — 77470 SPECIAL RADIATION TREATMENT: CPT | Performed by: RADIOLOGY

## 2022-09-13 PROCEDURE — 77338 DESIGN MLC DEVICE FOR IMRT: CPT | Performed by: RADIOLOGY

## 2022-09-13 PROCEDURE — 77300 RADIATION THERAPY DOSE PLAN: CPT | Performed by: RADIOLOGY

## 2022-09-13 PROCEDURE — 77301 RADIOTHERAPY DOSE PLAN IMRT: CPT | Performed by: RADIOLOGY

## 2022-09-15 ENCOUNTER — APPOINTMENT (OUTPATIENT)
Dept: WOMENS IMAGING | Facility: HOSPITAL | Age: 47
End: 2022-09-15

## 2022-09-15 PROCEDURE — 77065 DX MAMMO INCL CAD UNI: CPT | Performed by: RADIOLOGY

## 2022-09-15 PROCEDURE — 77061 BREAST TOMOSYNTHESIS UNI: CPT | Performed by: RADIOLOGY

## 2022-09-15 PROCEDURE — G0279 TOMOSYNTHESIS, MAMMO: HCPCS | Performed by: RADIOLOGY

## 2022-09-15 PROCEDURE — 76642 ULTRASOUND BREAST LIMITED: CPT | Performed by: RADIOLOGY

## 2022-09-19 LAB
RAD ONC ARIA COURSE ID: NORMAL
RAD ONC ARIA COURSE LAST TREATMENT DATE: NORMAL
RAD ONC ARIA COURSE START DATE: NORMAL
RAD ONC ARIA COURSE TREATMENT ELAPSED DAYS: 0
RAD ONC ARIA FIRST TREATMENT DATE: NORMAL
RAD ONC ARIA PLAN FRACTIONS TREATED TO DATE: 1
RAD ONC ARIA PLAN ID: NORMAL
RAD ONC ARIA PLAN PRESCRIBED DOSE PER FRACTION: 1.8 GY
RAD ONC ARIA PLAN PRIMARY REFERENCE POINT: NORMAL
RAD ONC ARIA PLAN TOTAL FRACTIONS PRESCRIBED: 25
RAD ONC ARIA PLAN TOTAL PRESCRIBED DOSE: 4500 CGY
RAD ONC ARIA REFERENCE POINT DOSAGE GIVEN TO DATE: 1.8 GY
RAD ONC ARIA REFERENCE POINT ID: NORMAL
RAD ONC ARIA REFERENCE POINT SESSION DOSAGE GIVEN: 1.8 GY

## 2022-09-19 PROCEDURE — 77014 CHG CT GUIDANCE RADIATION THERAPY FLDS PLACEMENT: CPT | Performed by: RADIOLOGY

## 2022-09-19 PROCEDURE — 77427 RADIATION TX MANAGEMENT X5: CPT | Performed by: RADIOLOGY

## 2022-09-19 PROCEDURE — 77386: CPT | Performed by: RADIOLOGY

## 2022-09-20 ENCOUNTER — HOSPITAL ENCOUNTER (OUTPATIENT)
Dept: RADIATION ONCOLOGY | Facility: HOSPITAL | Age: 47
Discharge: HOME | End: 2022-09-20

## 2022-09-20 LAB
RAD ONC ARIA COURSE ID: NORMAL
RAD ONC ARIA COURSE LAST TREATMENT DATE: NORMAL
RAD ONC ARIA COURSE START DATE: NORMAL
RAD ONC ARIA COURSE TREATMENT ELAPSED DAYS: 1
RAD ONC ARIA FIRST TREATMENT DATE: NORMAL
RAD ONC ARIA PLAN FRACTIONS TREATED TO DATE: 2
RAD ONC ARIA PLAN ID: NORMAL
RAD ONC ARIA PLAN PRESCRIBED DOSE PER FRACTION: 1.8 GY
RAD ONC ARIA PLAN PRIMARY REFERENCE POINT: NORMAL
RAD ONC ARIA PLAN TOTAL FRACTIONS PRESCRIBED: 25
RAD ONC ARIA PLAN TOTAL PRESCRIBED DOSE: 4500 CGY
RAD ONC ARIA REFERENCE POINT DOSAGE GIVEN TO DATE: 3.6 GY
RAD ONC ARIA REFERENCE POINT ID: NORMAL
RAD ONC ARIA REFERENCE POINT SESSION DOSAGE GIVEN: 1.8 GY

## 2022-09-20 PROCEDURE — 77014 CHG CT GUIDANCE RADIATION THERAPY FLDS PLACEMENT: CPT | Performed by: RADIOLOGY

## 2022-09-20 PROCEDURE — 77386: CPT | Performed by: RADIOLOGY

## 2022-09-21 ENCOUNTER — RADIATION ONCOLOGY WEEKLY ASSESSMENT (OUTPATIENT)
Dept: RADIATION ONCOLOGY | Facility: HOSPITAL | Age: 47
End: 2022-09-21

## 2022-09-21 ENCOUNTER — HOSPITAL ENCOUNTER (OUTPATIENT)
Dept: RADIATION ONCOLOGY | Facility: HOSPITAL | Age: 47
Discharge: HOME | End: 2022-09-21

## 2022-09-21 VITALS
BODY MASS INDEX: 33.24 KG/M2 | DIASTOLIC BLOOD PRESSURE: 81 MMHG | WEIGHT: 224.4 LBS | TEMPERATURE: 97.1 F | SYSTOLIC BLOOD PRESSURE: 157 MMHG | RESPIRATION RATE: 18 BRPM | OXYGEN SATURATION: 97 % | HEART RATE: 107 BPM | HEIGHT: 69 IN

## 2022-09-21 DIAGNOSIS — C20 RECTAL CANCER: Primary | ICD-10-CM

## 2022-09-21 LAB
RAD ONC ARIA COURSE ID: NORMAL
RAD ONC ARIA COURSE LAST TREATMENT DATE: NORMAL
RAD ONC ARIA COURSE START DATE: NORMAL
RAD ONC ARIA COURSE TREATMENT ELAPSED DAYS: 2
RAD ONC ARIA FIRST TREATMENT DATE: NORMAL
RAD ONC ARIA PLAN FRACTIONS TREATED TO DATE: 3
RAD ONC ARIA PLAN ID: NORMAL
RAD ONC ARIA PLAN PRESCRIBED DOSE PER FRACTION: 1.8 GY
RAD ONC ARIA PLAN PRIMARY REFERENCE POINT: NORMAL
RAD ONC ARIA PLAN TOTAL FRACTIONS PRESCRIBED: 25
RAD ONC ARIA PLAN TOTAL PRESCRIBED DOSE: 4500 CGY
RAD ONC ARIA REFERENCE POINT DOSAGE GIVEN TO DATE: 5.4 GY
RAD ONC ARIA REFERENCE POINT ID: NORMAL
RAD ONC ARIA REFERENCE POINT SESSION DOSAGE GIVEN: 1.8 GY

## 2022-09-21 PROCEDURE — 77386: CPT | Performed by: RADIOLOGY

## 2022-09-21 PROCEDURE — FACE2FACE: Performed by: RADIOLOGY

## 2022-09-21 PROCEDURE — 77014 CHG CT GUIDANCE RADIATION THERAPY FLDS PLACEMENT: CPT | Performed by: RADIOLOGY

## 2022-09-21 NOTE — PROGRESS NOTES
Patient Name: Yun Mcgowan  : 1975  MRN #: [id number]          RADIATION ON TREATMENT VISIT NOTE    DIAGNOSIS:    Diagnosis Plan   1. Rectal cancer (HCC)          Cancer Staging  Rectal cancer (HCC)  Staging form: Colon And Rectum, AJCC 8th Edition  - Clinical: Stage IIIB (cT3, cN2a, cM0) - Unsigned       TREATMENT COURSE:   Oncology/Hematology History   Rectal cancer (HCC)   2022 Initial Diagnosis    Rectal cancer (HCC)     2022 Procedure    Colonoscopy    MMR intact     2022 Imaging    CT Chest/Abd/Pel  No pulmonary mets  Cirrhotic liver, 4.2 x 4.8cm hypodense exophytic lesion along right inferior hepatic lobe (unchanged), similar exophytic 4.3 cm lesion along left lateral hepatic segment (unchanged),  7mm clementina-rectal LN  6.6cm left adnexal cyst     2022 Imaging    MRI Pelvis  FINDINGS: There is a circumferential soft tissue mass encircling the posterior mid rectum from the 1:00 to 10:00 position, approximately 8.4 cm above the anal verge. Craniocaudal extent of the mass measures approximately 4.8 cm. The mass invades through the muscularis propria and into the mesorectal fat along its left posterior lateral border as well as likely along its right lateral border. Tumor extends approximately 3 mm beyond the muscularis propria.. The tumor does not reach the mesorectal fascia.     There are at least 3 greater than 5 mm left-sided perirectal lymph nodes and 2 right-sided greater than 5 mm perirectal lymph node seen. These lymph nodes demonstrate enhancement on post gadolinium images with questionable mild restricted diffusion. No distant adenopathy is seen.     Urinary bladder and uterus appear free of disease.     There is a 5.9 cm cystic lesion in the left adnexa. Left ovary appears otherwise within normal limits. Right ovary is not definitely visualized.       2022 -  Chemotherapy    FOLFOX  Cycle 4 given 22           [x] Concurrent Chemo   Regimen: Xeloda       Treatment  "Site/Current Radiation Dose:  Radiation Treatments     Active   Plans   Pelvis Init   Most recent treatment: Dose planned: 180 cGy (fraction 3 on 9/21/2022)   Total: Dose planned: 4,500 cGy (25 fractions)   Elapsed Days: 2      Reference Points   Pelvis Init   Most recent treatment: Dose given: 180 cGy (on 9/21/2022)   Total: Dose given: 540 cGy   Elapsed Days: 2                   Current Radiation Dose:   540 cGy    Subjective:  Doing well early into treatment  No RT complaints  Having some alternating loose stools and constipation  No rectal bleeding    EXAM  KPS: 90    Vitals:    09/21/22 1605   BP: 157/81   Pulse: 107   Resp: 18   Temp: 97.1 °F (36.2 °C)   TempSrc: Temporal   SpO2: 97%   Weight: 102 kg (224 lb 6.4 oz)   Height: 175.3 cm (69\")     Pain Score    09/21/22 1605   PainSc: 0-No pain       Weight:   Wt Readings from Last 3 Encounters:   09/21/22 102 kg (224 lb 6.4 oz)   07/25/22 101 kg (223 lb 9.6 oz)   07/20/22 103 kg (227 lb 4.7 oz)       NAD      LABS (Reviewed):    Hematology  WBC   Date Value Ref Range Status   05/23/2022 8.60 3.40 - 10.80 10*3/mm3 Final   11/07/2020 10.30 4.5 - 11.0 10*3/uL Final     RBC   Date Value Ref Range Status   05/23/2022 4.49 3.77 - 5.28 10*6/mm3 Final   11/07/2020 4.17 4.0 - 5.2 10*6/uL Final     Hemoglobin   Date Value Ref Range Status   05/23/2022 13.0 12.0 - 15.9 g/dL Final   11/07/2020 12.4 12.0 - 16.0 g/dL Final     Hematocrit   Date Value Ref Range Status   05/23/2022 38.8 34.0 - 46.6 % Final   11/07/2020 36.8 36.0 - 46.0 % Final     Platelets   Date Value Ref Range Status   05/23/2022 254 140 - 450 10*3/mm3 Final   11/07/2020 233 140 - 440 10*3/uL Final       Chemistry No results found for: GLUCOSE, BUN, CREATININE, EGFRIFNONA, EGFRIFAFRI, BCR, K, CO2, CALCIUM, PROTENTOTREF, ALBUMIN, LABIL2, BILIRUBIN, AST, ALT      Medication:     Current Outpatient Medications:   •  ALPRAZolam (XANAX) 0.5 MG tablet, Take 0.5 mg by mouth 2 (Two) Times a Day As Needed for " Anxiety., Disp: , Rfl:   •  ascorbic acid (VITAMIN C) 500 MG tablet, Take 500 mg by mouth Daily., Disp: , Rfl:   •  baclofen (LIORESAL) 10 MG tablet, Take 10 mg by mouth 3 (Three) Times a Day., Disp: , Rfl:   •  buPROPion XL (WELLBUTRIN XL) 150 MG 24 hr tablet, Take 300 mg by mouth Daily., Disp: , Rfl:   •  dicyclomine (BENTYL) 10 MG capsule, Take 10 mg by mouth 4 (Four) Times a Day Before Meals & at Bedtime., Disp: , Rfl:   •  fexofenadine (ALLEGRA) 60 MG tablet, Take 60 mg by mouth Daily., Disp: , Rfl:   •  gabapentin (NEURONTIN) 300 MG capsule, Take 300 mg by mouth 3 (Three) Times a Day., Disp: , Rfl:   •  Insulin Degludec (Tresiba FlexTouch) 200 UNIT/ML solution pen-injector pen injection, Inject 80 Units under the skin into the appropriate area as directed Daily., Disp: , Rfl:   •  Insulin Lispro (humaLOG) 100 UNIT/ML injection, Inject 30 Units under the skin into the appropriate area as directed 3 (Three) Times a Day Before Meals., Disp: , Rfl:   •  lisinopril (PRINIVIL,ZESTRIL) 10 MG tablet, Take 10 mg by mouth Daily., Disp: , Rfl:   •  magnesium gluconate (MAGONATE) 500 MG tablet, Take 27 mg by mouth Daily., Disp: , Rfl:   •  melatonin 5 MG tablet tablet, Take 10 mg by mouth Every Night., Disp: , Rfl:   •  multivitamin with minerals tablet tablet, Take 1 tablet by mouth Daily., Disp: , Rfl:   •  Naltrexone HCl (DEPADE PO), Take 4 mg by mouth Every Night., Disp: , Rfl:   •  norethindrone (MICRONOR) 0.35 MG tablet, Take 1 tablet by mouth Daily., Disp: , Rfl:   •  pantoprazole (PROTONIX) 40 MG EC tablet, Take 40 mg by mouth Daily., Disp: , Rfl:   •  phentermine (ADIPEX-P) 37.5 MG tablet, Take 37.5 mg by mouth Every Morning Before Breakfast., Disp: , Rfl:   •  rizatriptan (MAXALT) 10 MG tablet, Take 10 mg by mouth 1 (One) Time As Needed for Migraine. May repeat in 2 hours if needed, Disp: , Rfl:   •  Semaglutide, 1 MG/DOSE, (Ozempic, 1 MG/DOSE,) 2 MG/1.5ML solution pen-injector, Inject 1 mg under the skin into  the appropriate area as directed 1 (One) Time Per Week., Disp: , Rfl:   •  sorbitol 70 % solution, Take 15 mL by mouth Daily As Needed., Disp: , Rfl:   •  Thyroid 90 MG PO tablet, Take 180 mg by mouth Daily., Disp: , Rfl:   •  topiramate (TOPAMAX) 25 MG capsule (sprinkle), Take 25 mg by mouth 2 (Two) Times a Day., Disp: , Rfl:   •  vitamin B-12 (CYANOCOBALAMIN) 1000 MCG tablet, Take 1,000 mcg by mouth Daily., Disp: , Rfl:   •  vitamin D3 125 MCG (5000 UT) capsule capsule, Take 5,000 Units by mouth Daily., Disp: , Rfl:       PAIN:   Yun Mcgowan reports a pain score of 0.  Given her pain assessment as noted, treatment options were discussed and the following options were decided upon as a follow-up plan to address the patient's pain: continuation of current treatment plan for pain.        Patient chart including appropriate labs, setup images, treatment parameters, delivered dose have been reviewed      Recommendations:   [x] Continue RT  [] Change RT Plan [] Hold RT, length:          Approved Electronically By:  Chema Araiza MD, 9/21/2022, 16:05 EDT

## 2022-09-22 ENCOUNTER — HOSPITAL ENCOUNTER (OUTPATIENT)
Dept: RADIATION ONCOLOGY | Facility: HOSPITAL | Age: 47
Discharge: HOME | End: 2022-09-22

## 2022-09-22 LAB
RAD ONC ARIA COURSE ID: NORMAL
RAD ONC ARIA COURSE LAST TREATMENT DATE: NORMAL
RAD ONC ARIA COURSE START DATE: NORMAL
RAD ONC ARIA COURSE TREATMENT ELAPSED DAYS: 3
RAD ONC ARIA FIRST TREATMENT DATE: NORMAL
RAD ONC ARIA PLAN FRACTIONS TREATED TO DATE: 4
RAD ONC ARIA PLAN ID: NORMAL
RAD ONC ARIA PLAN PRESCRIBED DOSE PER FRACTION: 1.8 GY
RAD ONC ARIA PLAN PRIMARY REFERENCE POINT: NORMAL
RAD ONC ARIA PLAN TOTAL FRACTIONS PRESCRIBED: 25
RAD ONC ARIA PLAN TOTAL PRESCRIBED DOSE: 4500 CGY
RAD ONC ARIA REFERENCE POINT DOSAGE GIVEN TO DATE: 7.2 GY
RAD ONC ARIA REFERENCE POINT ID: NORMAL
RAD ONC ARIA REFERENCE POINT SESSION DOSAGE GIVEN: 1.8 GY

## 2022-09-22 PROCEDURE — 77336 RADIATION PHYSICS CONSULT: CPT | Performed by: RADIOLOGY

## 2022-09-22 PROCEDURE — 77014 CHG CT GUIDANCE RADIATION THERAPY FLDS PLACEMENT: CPT | Performed by: RADIOLOGY

## 2022-09-22 PROCEDURE — 77386: CPT | Performed by: RADIOLOGY

## 2022-09-23 ENCOUNTER — HOSPITAL ENCOUNTER (OUTPATIENT)
Dept: RADIATION ONCOLOGY | Facility: HOSPITAL | Age: 47
Discharge: HOME | End: 2022-09-23

## 2022-09-23 LAB
RAD ONC ARIA COURSE ID: NORMAL
RAD ONC ARIA COURSE LAST TREATMENT DATE: NORMAL
RAD ONC ARIA COURSE START DATE: NORMAL
RAD ONC ARIA COURSE TREATMENT ELAPSED DAYS: 4
RAD ONC ARIA FIRST TREATMENT DATE: NORMAL
RAD ONC ARIA PLAN FRACTIONS TREATED TO DATE: 5
RAD ONC ARIA PLAN ID: NORMAL
RAD ONC ARIA PLAN PRESCRIBED DOSE PER FRACTION: 1.8 GY
RAD ONC ARIA PLAN PRIMARY REFERENCE POINT: NORMAL
RAD ONC ARIA PLAN TOTAL FRACTIONS PRESCRIBED: 25
RAD ONC ARIA PLAN TOTAL PRESCRIBED DOSE: 4500 CGY
RAD ONC ARIA REFERENCE POINT DOSAGE GIVEN TO DATE: 9 GY
RAD ONC ARIA REFERENCE POINT ID: NORMAL
RAD ONC ARIA REFERENCE POINT SESSION DOSAGE GIVEN: 1.8 GY

## 2022-09-23 PROCEDURE — 77386: CPT | Performed by: RADIOLOGY

## 2022-09-23 PROCEDURE — 77014 CHG CT GUIDANCE RADIATION THERAPY FLDS PLACEMENT: CPT | Performed by: RADIOLOGY

## 2022-09-26 ENCOUNTER — HOSPITAL ENCOUNTER (OUTPATIENT)
Dept: RADIATION ONCOLOGY | Facility: HOSPITAL | Age: 47
Discharge: HOME | End: 2022-09-26

## 2022-09-26 LAB
RAD ONC ARIA COURSE ID: NORMAL
RAD ONC ARIA COURSE LAST TREATMENT DATE: NORMAL
RAD ONC ARIA COURSE START DATE: NORMAL
RAD ONC ARIA COURSE TREATMENT ELAPSED DAYS: 7
RAD ONC ARIA FIRST TREATMENT DATE: NORMAL
RAD ONC ARIA PLAN FRACTIONS TREATED TO DATE: 6
RAD ONC ARIA PLAN ID: NORMAL
RAD ONC ARIA PLAN PRESCRIBED DOSE PER FRACTION: 1.8 GY
RAD ONC ARIA PLAN PRIMARY REFERENCE POINT: NORMAL
RAD ONC ARIA PLAN TOTAL FRACTIONS PRESCRIBED: 25
RAD ONC ARIA PLAN TOTAL PRESCRIBED DOSE: 4500 CGY
RAD ONC ARIA REFERENCE POINT DOSAGE GIVEN TO DATE: 10.8 GY
RAD ONC ARIA REFERENCE POINT ID: NORMAL
RAD ONC ARIA REFERENCE POINT SESSION DOSAGE GIVEN: 1.8 GY

## 2022-09-26 PROCEDURE — 77386: CPT | Performed by: RADIOLOGY

## 2022-09-26 PROCEDURE — 77014 CHG CT GUIDANCE RADIATION THERAPY FLDS PLACEMENT: CPT | Performed by: RADIOLOGY

## 2022-09-26 PROCEDURE — 77427 RADIATION TX MANAGEMENT X5: CPT | Performed by: RADIOLOGY

## 2022-09-27 ENCOUNTER — OFFICE VISIT (OUTPATIENT)
Dept: ENDOCRINOLOGY | Facility: CLINIC | Age: 47
End: 2022-09-27

## 2022-09-27 ENCOUNTER — HOSPITAL ENCOUNTER (OUTPATIENT)
Dept: RADIATION ONCOLOGY | Facility: HOSPITAL | Age: 47
Discharge: HOME | End: 2022-09-27

## 2022-09-27 DIAGNOSIS — E10.65 TYPE 1 DIABETES MELLITUS WITH HYPERGLYCEMIA: ICD-10-CM

## 2022-09-27 LAB
RAD ONC ARIA COURSE ID: NORMAL
RAD ONC ARIA COURSE LAST TREATMENT DATE: NORMAL
RAD ONC ARIA COURSE START DATE: NORMAL
RAD ONC ARIA COURSE TREATMENT ELAPSED DAYS: 8
RAD ONC ARIA FIRST TREATMENT DATE: NORMAL
RAD ONC ARIA PLAN FRACTIONS TREATED TO DATE: 7
RAD ONC ARIA PLAN ID: NORMAL
RAD ONC ARIA PLAN PRESCRIBED DOSE PER FRACTION: 1.8 GY
RAD ONC ARIA PLAN PRIMARY REFERENCE POINT: NORMAL
RAD ONC ARIA PLAN TOTAL FRACTIONS PRESCRIBED: 25
RAD ONC ARIA PLAN TOTAL PRESCRIBED DOSE: 4500 CGY
RAD ONC ARIA REFERENCE POINT DOSAGE GIVEN TO DATE: 12.6 GY
RAD ONC ARIA REFERENCE POINT ID: NORMAL
RAD ONC ARIA REFERENCE POINT SESSION DOSAGE GIVEN: 1.8 GY

## 2022-09-27 PROCEDURE — 77014 CHG CT GUIDANCE RADIATION THERAPY FLDS PLACEMENT: CPT | Performed by: RADIOLOGY

## 2022-09-27 PROCEDURE — 77386: CPT | Performed by: RADIOLOGY

## 2022-09-27 PROCEDURE — 97802 MEDICAL NUTRITION INDIV IN: CPT | Performed by: DIETITIAN, REGISTERED

## 2022-09-28 ENCOUNTER — OFFICE VISIT (OUTPATIENT)
Dept: PSYCHIATRY | Facility: CLINIC | Age: 47
End: 2022-09-28

## 2022-09-28 ENCOUNTER — HOSPITAL ENCOUNTER (OUTPATIENT)
Dept: RADIATION ONCOLOGY | Facility: HOSPITAL | Age: 47
Discharge: HOME | End: 2022-09-28

## 2022-09-28 ENCOUNTER — PATIENT ROUNDING (BHMG ONLY) (OUTPATIENT)
Dept: PSYCHIATRY | Facility: CLINIC | Age: 47
End: 2022-09-28

## 2022-09-28 ENCOUNTER — RADIATION ONCOLOGY WEEKLY ASSESSMENT (OUTPATIENT)
Dept: RADIATION ONCOLOGY | Facility: HOSPITAL | Age: 47
End: 2022-09-28

## 2022-09-28 ENCOUNTER — PRIOR AUTHORIZATION (OUTPATIENT)
Dept: PSYCHIATRY | Facility: CLINIC | Age: 47
End: 2022-09-28

## 2022-09-28 VITALS
BODY MASS INDEX: 32.53 KG/M2 | WEIGHT: 219.6 LBS | OXYGEN SATURATION: 99 % | SYSTOLIC BLOOD PRESSURE: 130 MMHG | HEART RATE: 105 BPM | HEIGHT: 69 IN | DIASTOLIC BLOOD PRESSURE: 74 MMHG

## 2022-09-28 VITALS
OXYGEN SATURATION: 98 % | WEIGHT: 220 LBS | DIASTOLIC BLOOD PRESSURE: 82 MMHG | BODY MASS INDEX: 32.49 KG/M2 | SYSTOLIC BLOOD PRESSURE: 142 MMHG | RESPIRATION RATE: 18 BRPM | HEART RATE: 104 BPM

## 2022-09-28 DIAGNOSIS — C20 RECTAL CANCER: Primary | ICD-10-CM

## 2022-09-28 DIAGNOSIS — F41.0 GENERALIZED ANXIETY DISORDER WITH PANIC ATTACKS: Chronic | ICD-10-CM

## 2022-09-28 DIAGNOSIS — F41.1 GENERALIZED ANXIETY DISORDER WITH PANIC ATTACKS: Chronic | ICD-10-CM

## 2022-09-28 DIAGNOSIS — F43.22 ADJUSTMENT DISORDER WITH ANXIOUS MOOD: Primary | ICD-10-CM

## 2022-09-28 DIAGNOSIS — R39.15 URGENCY OF URINATION: ICD-10-CM

## 2022-09-28 DIAGNOSIS — F33.41 RECURRENT MAJOR DEPRESSIVE DISORDER, IN PARTIAL REMISSION: Chronic | ICD-10-CM

## 2022-09-28 PROBLEM — E05.90 HYPERTHYROIDISM: Status: ACTIVE | Noted: 2022-09-28

## 2022-09-28 PROBLEM — J30.2 SEASONAL ALLERGIC RHINITIS: Status: ACTIVE | Noted: 2019-06-07

## 2022-09-28 PROBLEM — E11.9 DIABETES MELLITUS: Status: ACTIVE | Noted: 2019-06-07

## 2022-09-28 PROBLEM — F43.23 ADJUSTMENT DISORDER WITH MIXED ANXIETY AND DEPRESSED MOOD: Chronic | Status: ACTIVE | Noted: 2022-09-28

## 2022-09-28 PROBLEM — D13.4 BENIGN NEOPLASM OF LIVER: Status: ACTIVE | Noted: 2019-10-27

## 2022-09-28 PROBLEM — R16.0 LIVER MASS: Status: ACTIVE | Noted: 2022-01-21

## 2022-09-28 PROBLEM — E89.0 POSTABLATIVE HYPOTHYROIDISM: Status: ACTIVE | Noted: 2022-02-28

## 2022-09-28 PROBLEM — K74.60 HEPATIC CIRRHOSIS (HCC): Status: ACTIVE | Noted: 2022-01-21

## 2022-09-28 PROBLEM — K21.9 GASTROESOPHAGEAL REFLUX DISEASE: Status: ACTIVE | Noted: 2022-04-12

## 2022-09-28 PROBLEM — L65.9 ALOPECIA: Status: ACTIVE | Noted: 2020-12-23

## 2022-09-28 PROBLEM — I49.1 PREMATURE ATRIAL CONTRACTION: Status: ACTIVE | Noted: 2022-03-14

## 2022-09-28 PROBLEM — E66.9 OBESITY: Status: ACTIVE | Noted: 2018-02-27

## 2022-09-28 PROBLEM — E05.90 HYPERTHYROIDISM: Status: RESOLVED | Noted: 2022-09-28 | Resolved: 2022-09-28

## 2022-09-28 PROBLEM — G43.909 MIGRAINE HEADACHE: Status: ACTIVE | Noted: 2022-01-22

## 2022-09-28 PROBLEM — E88.1 LIPODYSTROPHY: Status: ACTIVE | Noted: 2022-01-22

## 2022-09-28 PROBLEM — I10 HYPERTENSION: Status: ACTIVE | Noted: 2020-12-23

## 2022-09-28 PROBLEM — E78.5 HYPERLIPIDEMIA: Status: ACTIVE | Noted: 2020-12-23

## 2022-09-28 LAB
RAD ONC ARIA COURSE ID: NORMAL
RAD ONC ARIA COURSE LAST TREATMENT DATE: NORMAL
RAD ONC ARIA COURSE START DATE: NORMAL
RAD ONC ARIA COURSE TREATMENT ELAPSED DAYS: 9
RAD ONC ARIA FIRST TREATMENT DATE: NORMAL
RAD ONC ARIA PLAN FRACTIONS TREATED TO DATE: 8
RAD ONC ARIA PLAN ID: NORMAL
RAD ONC ARIA PLAN PRESCRIBED DOSE PER FRACTION: 1.8 GY
RAD ONC ARIA PLAN PRIMARY REFERENCE POINT: NORMAL
RAD ONC ARIA PLAN TOTAL FRACTIONS PRESCRIBED: 25
RAD ONC ARIA PLAN TOTAL PRESCRIBED DOSE: 4500 CGY
RAD ONC ARIA REFERENCE POINT DOSAGE GIVEN TO DATE: 14.4 GY
RAD ONC ARIA REFERENCE POINT ID: NORMAL
RAD ONC ARIA REFERENCE POINT SESSION DOSAGE GIVEN: 1.8 GY

## 2022-09-28 PROCEDURE — 77014 CHG CT GUIDANCE RADIATION THERAPY FLDS PLACEMENT: CPT | Performed by: RADIOLOGY

## 2022-09-28 PROCEDURE — FACE2FACE: Performed by: RADIOLOGY

## 2022-09-28 PROCEDURE — 77386: CPT | Performed by: RADIOLOGY

## 2022-09-28 PROCEDURE — 90792 PSYCH DIAG EVAL W/MED SRVCS: CPT

## 2022-09-28 RX ORDER — PROMETHAZINE HYDROCHLORIDE 25 MG/1
TABLET ORAL
COMMUNITY
Start: 2022-09-06

## 2022-09-28 RX ORDER — PYRAZINAMIDE 500 MG/1
1 TABLET ORAL
COMMUNITY

## 2022-09-28 RX ORDER — LACTULOSE 10 G/15ML
SOLUTION ORAL
COMMUNITY
Start: 2022-08-11

## 2022-09-28 RX ORDER — LEVOTHYROXINE AND LIOTHYRONINE 57; 13.5 UG/1; UG/1
180 TABLET ORAL DAILY
COMMUNITY
End: 2022-12-06

## 2022-09-28 RX ORDER — MELATONIN
5000 DAILY
COMMUNITY
End: 2022-12-06

## 2022-09-28 RX ORDER — BUPROPION HYDROCHLORIDE 300 MG/1
300 TABLET ORAL EVERY MORNING
COMMUNITY
Start: 2022-06-26

## 2022-09-28 RX ORDER — DICYCLOMINE HYDROCHLORIDE 10 MG/1
10 CAPSULE ORAL 4 TIMES DAILY
COMMUNITY
Start: 2022-04-12 | End: 2023-04-12

## 2022-09-28 RX ORDER — LEVONORGESTREL AND ETHINYL ESTRADIOL 0.15-0.03
1 KIT ORAL DAILY
COMMUNITY
End: 2022-12-06

## 2022-09-28 RX ORDER — LISINOPRIL 10 MG/1
10 TABLET ORAL DAILY
COMMUNITY

## 2022-09-28 RX ORDER — DESVENLAFAXINE SUCCINATE 50 MG/1
50 TABLET, EXTENDED RELEASE ORAL EVERY MORNING
Qty: 90 TABLET | Refills: 1 | Status: SHIPPED | OUTPATIENT
Start: 2022-09-28 | End: 2023-03-03 | Stop reason: SDUPTHER

## 2022-09-28 RX ORDER — FEXOFENADINE HYDROCHLORIDE 60 MG/1
60 TABLET, FILM COATED ORAL DAILY
COMMUNITY

## 2022-09-28 RX ORDER — TOPIRAMATE 25 MG/1
25 TABLET ORAL DAILY
COMMUNITY
Start: 2022-09-08 | End: 2022-09-28 | Stop reason: DRUGHIGH

## 2022-09-28 RX ORDER — UBIDECARENONE 75 MG
1000 CAPSULE ORAL DAILY
COMMUNITY
End: 2022-09-28 | Stop reason: DRUGHIGH

## 2022-09-28 RX ORDER — ASCORBIC ACID 250 MG
500 TABLET ORAL DAILY
COMMUNITY
End: 2022-12-06

## 2022-09-28 RX ORDER — CYCLOBENZAPRINE HCL 10 MG
10 TABLET ORAL
COMMUNITY

## 2022-09-28 RX ORDER — PHENOL 1.4 %
AEROSOL, SPRAY (ML) MUCOUS MEMBRANE
COMMUNITY

## 2022-09-28 RX ORDER — ONDANSETRON 4 MG/1
TABLET, FILM COATED ORAL
COMMUNITY
Start: 2022-08-09

## 2022-09-28 RX ORDER — IBUPROFEN 800 MG/1
TABLET ORAL
COMMUNITY

## 2022-09-28 RX ORDER — ALPRAZOLAM 0.5 MG/1
0.5 TABLET ORAL
COMMUNITY
Start: 2022-04-11 | End: 2022-12-06

## 2022-09-28 NOTE — PROGRESS NOTES
A My-Chart message has been sent to the patient for PATIENT ROUNDING with [facility]

## 2022-09-28 NOTE — PROGRESS NOTES
Spent 1 hour with pt for ind. MNT appt. See edu rec 1. Fang reports and lab results scanned into visit. Pt is currently undergoing tx for CA, and she is taking rx's that raise her BG. She is currently carb counting using a ICR of 1:1. Discussed accurately measuring portions for carb counting, decreasing intake of SF beverages/increasing intake of water, pairing CHO and protein, and increasing intake of fiber/non-starchy veggies. Pt will continue to follow with MD but was advised to make a f/u MNT appt if needed.

## 2022-09-28 NOTE — TELEPHONE ENCOUNTER
Weston Gardner with [facility]   [phone] Passcode [id number]  Marsha Alvarez is participating/ In network   with [facility]    No Authorization required  For outpatient  Therapy   CP $10   Ref# [id number]  Arianne CORTES

## 2022-09-28 NOTE — PROGRESS NOTES
Patient Name: Yun Mcgowan Date: 2022       : 1975        MRN #: [id number] Diagnosis: rectal cancer                  RADIATION ON TREATMENT VISIT NOTE - PELVIS    Treatment Summary    [x] Concurrent Chemo   Labs requested from Dr. Frances's       Treatment Site Ref. ID Energy Dose/Fx (cGy) #Fx Dose Correction (cGy) Total Dose (cGy) Start Date End Date Elapsed Days   Pelvis Init Pelvis Init 6X 180  0 1,440 2022 9       General:           Review of Systems    [] No new complaints [] Nocturia [x] Slow urinary stream  [] Difficulty initiating urination [] Dysuria [] Vaginal discharge  [] Increased frequency of urination [x] Soft/loose stool [x] Diarrhea  [] Rectal burning  [] Skin soreness, location:   [] Fatigue,  severity: ----------------  [] Pain,  severity: ----------------, location:   Bladder medication regimen: NONE   Pain medication regimen: NONE   Bowel regimen: Imodium    Skin regimen: NONE     Comments/Notes:  Also c/o urinary urgency without dysuria, and diarrhea being managed with Imodium. No c/o skin irritation.  Slower stream; discussed UA; she wants to delay until tomorrow--just went to the restroom    KPS: 90%       Vital Signs: /82   Pulse 104   Resp 18   Wt 99.8 kg (220 lb)   SpO2 98%   BMI 32.49 kg/m²     Weight:   Wt Readings from Last 3 Encounters:   22 99.8 kg (220 lb)   22 99.6 kg (219 lb 9.6 oz)   22 102 kg (224 lb 6.4 oz)       Medication:   Current Outpatient Medications:   •  acetaminophen-codeine (TYLENOL with CODEINE #3) 300-30 MG per tablet, Take 1 tablet by mouth., Disp: , Rfl:   •  ALPRAZolam (XANAX) 0.5 MG tablet, Take 0.5 mg by mouth 2 (Two) Times a Day As Needed for Anxiety., Disp: , Rfl:   •  ascorbic acid (VITAMIN C) 500 MG tablet, Take 500 mg by mouth Daily., Disp: , Rfl:   •  baclofen (LIORESAL) 10 MG tablet, Take 10 mg by mouth 3 (Three) Times a Day., Disp: , Rfl:   •  buPROPion XL (WELLBUTRIN XL) 150 MG 24 hr  tablet, Take 300 mg by mouth Daily., Disp: , Rfl:   •  cyclobenzaprine (FLEXERIL) 10 MG tablet, Take 10 mg by mouth., Disp: , Rfl:   •  dicyclomine (BENTYL) 10 MG capsule, Take 10 mg by mouth 4 (Four) Times a Day Before Meals & at Bedtime., Disp: , Rfl:   •  fexofenadine (ALLEGRA) 60 MG tablet, Take 60 mg by mouth Daily., Disp: , Rfl:   •  ibuprofen (ADVIL,MOTRIN) 800 MG tablet, Take  by mouth., Disp: , Rfl:   •  Insulin Degludec (Tresiba FlexTouch) 200 UNIT/ML solution pen-injector pen injection, Inject 80 Units under the skin into the appropriate area as directed Daily., Disp: , Rfl:   •  Insulin Lispro (humaLOG) 100 UNIT/ML injection, Inject 30 Units under the skin into the appropriate area as directed 3 (Three) Times a Day Before Meals., Disp: , Rfl:   •  lisinopril (PRINIVIL,ZESTRIL) 10 MG tablet, Take 10 mg by mouth Daily., Disp: , Rfl:   •  magnesium gluconate (MAGONATE) 500 MG tablet, Take 27 mg by mouth Daily., Disp: , Rfl:   •  melatonin 5 MG tablet tablet, Take 10 mg by mouth Every Night., Disp: , Rfl:   •  multivitamin with minerals tablet tablet, Take 1 tablet by mouth Daily., Disp: , Rfl:   •  Naltrexone HCl (DEPADE PO), Take 4 mg by mouth Every Night., Disp: , Rfl:   •  norethindrone (MICRONOR) 0.35 MG tablet, Take 1 tablet by mouth Daily., Disp: , Rfl:   •  pantoprazole (PROTONIX) 40 MG EC tablet, Take 40 mg by mouth Daily., Disp: , Rfl:   •  phentermine (ADIPEX-P) 37.5 MG tablet, Take 37.5 mg by mouth Every Morning Before Breakfast., Disp: , Rfl:   •  promethazine (PHENERGAN) 25 MG tablet, TAKE 1 TABLET BY MOUTH EVERY 4 TO 6 HOURS AS NEEDED, Disp: , Rfl:   •  rizatriptan (MAXALT) 10 MG tablet, Take 10 mg by mouth 1 (One) Time As Needed for Migraine. May repeat in 2 hours if needed, Disp: , Rfl:   •  Semaglutide, 1 MG/DOSE, (Ozempic, 1 MG/DOSE,) 2 MG/1.5ML solution pen-injector, Inject 1 mg under the skin into the appropriate area as directed 1 (One) Time Per Week., Disp: , Rfl:   •  sorbitol 70 %  solution, Take 15 mL by mouth Daily As Needed., Disp: , Rfl:   •  Thyroid 90 MG PO tablet, Take 180 mg by mouth Daily., Disp: , Rfl:   •  topiramate (TOPAMAX) 25 MG capsule (sprinkle), Take 25 mg by mouth 2 (Two) Times a Day., Disp: , Rfl:   •  vitamin B-12 (CYANOCOBALAMIN) 1000 MCG tablet, Take 1,000 mcg by mouth Daily., Disp: , Rfl:   •  vitamin D3 125 MCG (5000 UT) capsule capsule, Take 5,000 Units by mouth Daily., Disp: , Rfl:        LABS (Reviewed):  Hematology WBC   Date Value Ref Range Status   05/23/2022 8.60 3.40 - 10.80 10*3/mm3 Final   11/07/2020 10.30 4.5 - 11.0 10*3/uL Final     RBC   Date Value Ref Range Status   05/23/2022 4.49 3.77 - 5.28 10*6/mm3 Final   11/07/2020 4.17 4.0 - 5.2 10*6/uL Final     Hemoglobin   Date Value Ref Range Status   05/23/2022 13.0 12.0 - 15.9 g/dL Final   11/07/2020 12.4 12.0 - 16.0 g/dL Final     Hematocrit   Date Value Ref Range Status   05/23/2022 38.8 34.0 - 46.6 % Final   11/07/2020 36.8 36.0 - 46.0 % Final     Platelets   Date Value Ref Range Status   05/23/2022 254 140 - 450 10*3/mm3 Final   11/07/2020 233 140 - 440 10*3/uL Final          Physical Exam:         Abdomen:  [x] Soft   [x] Bowel sounds  GYN:   [] Vaginal discharge  Extremities:  [] Edema  GI:   [x] Diarrhea--Imodium discussed  [] Enteritis     [] Proctitis  [] Vomiting  Renal/Genitourinary: [] Bladder spasms [] Cystitis     [] Incontinence  Skin:   [x] stGstrstastdstest:st st1st Comments/Notes:     [x] Review of labs, images, dosimetry, dose delivered, & treatment parameters.    Comments:     [x] Patient treatment setup reviewed.    Comments:     Recommendations: continue XRT and supportive   UA and reflex culture ordered    [x] Continue RT  [] Change RT Plan [] Hold RT, length:        Approved Electronically By:  Trent Luna MD, 9/28/2022, 14:53 EDT          Confidentiality of this medical record shall be maintained except when use or disclosure is required or permitted by law, regulation or written authorization by the  patient.

## 2022-09-29 ENCOUNTER — HOSPITAL ENCOUNTER (OUTPATIENT)
Dept: RADIATION ONCOLOGY | Facility: HOSPITAL | Age: 47
Discharge: HOME | End: 2022-09-29

## 2022-09-29 ENCOUNTER — LAB (OUTPATIENT)
Dept: LAB | Facility: HOSPITAL | Age: 47
End: 2022-09-29

## 2022-09-29 ENCOUNTER — APPOINTMENT (OUTPATIENT)
Dept: WOMENS IMAGING | Facility: HOSPITAL | Age: 47
End: 2022-09-29

## 2022-09-29 DIAGNOSIS — R39.15 URGENCY OF URINATION: ICD-10-CM

## 2022-09-29 DIAGNOSIS — C20 RECTAL CANCER: ICD-10-CM

## 2022-09-29 LAB
BACTERIA UR QL AUTO: ABNORMAL /HPF
BILIRUB UR QL STRIP: NEGATIVE
CLARITY UR: CLEAR
COLOR UR: YELLOW
GLUCOSE UR STRIP-MCNC: ABNORMAL MG/DL
HGB UR QL STRIP.AUTO: NEGATIVE
HYALINE CASTS UR QL AUTO: ABNORMAL /LPF
KETONES UR QL STRIP: NEGATIVE
LEUKOCYTE ESTERASE UR QL STRIP.AUTO: ABNORMAL
NITRITE UR QL STRIP: NEGATIVE
PH UR STRIP.AUTO: 6 [PH] (ref 5–8)
PROT UR QL STRIP: NEGATIVE
RAD ONC ARIA COURSE ID: NORMAL
RAD ONC ARIA COURSE LAST TREATMENT DATE: NORMAL
RAD ONC ARIA COURSE START DATE: NORMAL
RAD ONC ARIA COURSE TREATMENT ELAPSED DAYS: 10
RAD ONC ARIA FIRST TREATMENT DATE: NORMAL
RAD ONC ARIA PLAN FRACTIONS TREATED TO DATE: 9
RAD ONC ARIA PLAN ID: NORMAL
RAD ONC ARIA PLAN PRESCRIBED DOSE PER FRACTION: 1.8 GY
RAD ONC ARIA PLAN PRIMARY REFERENCE POINT: NORMAL
RAD ONC ARIA PLAN TOTAL FRACTIONS PRESCRIBED: 25
RAD ONC ARIA PLAN TOTAL PRESCRIBED DOSE: 4500 CGY
RAD ONC ARIA REFERENCE POINT DOSAGE GIVEN TO DATE: 16.2 GY
RAD ONC ARIA REFERENCE POINT ID: NORMAL
RAD ONC ARIA REFERENCE POINT SESSION DOSAGE GIVEN: 1.8 GY
RBC # UR STRIP: ABNORMAL /HPF
REF LAB TEST METHOD: ABNORMAL
SP GR UR STRIP: <=1.005 (ref 1–1.03)
SQUAMOUS #/AREA URNS HPF: ABNORMAL /HPF
UROBILINOGEN UR QL STRIP: ABNORMAL
WBC # UR STRIP: ABNORMAL /HPF

## 2022-09-29 PROCEDURE — 77336 RADIATION PHYSICS CONSULT: CPT | Performed by: RADIOLOGY

## 2022-09-29 PROCEDURE — 87086 URINE CULTURE/COLONY COUNT: CPT

## 2022-09-29 PROCEDURE — 81001 URINALYSIS AUTO W/SCOPE: CPT

## 2022-09-29 PROCEDURE — 77386: CPT | Performed by: RADIOLOGY

## 2022-09-29 PROCEDURE — 77014 CHG CT GUIDANCE RADIATION THERAPY FLDS PLACEMENT: CPT | Performed by: RADIOLOGY

## 2022-09-29 PROCEDURE — 19083 BX BREAST 1ST LESION US IMAG: CPT | Performed by: RADIOLOGY

## 2022-09-29 PROCEDURE — A4648 IMPLANTABLE TISSUE MARKER: HCPCS | Performed by: RADIOLOGY

## 2022-09-29 RX ORDER — NITROFURANTOIN 25; 75 MG/1; MG/1
100 CAPSULE ORAL 2 TIMES DAILY
Qty: 14 CAPSULE | Refills: 0 | Status: SHIPPED | OUTPATIENT
Start: 2022-09-29

## 2022-09-30 ENCOUNTER — HOSPITAL ENCOUNTER (OUTPATIENT)
Dept: RADIATION ONCOLOGY | Facility: HOSPITAL | Age: 47
Discharge: HOME | End: 2022-09-30

## 2022-09-30 LAB
BACTERIA SPEC AEROBE CULT: NORMAL
RAD ONC ARIA COURSE ID: NORMAL
RAD ONC ARIA COURSE LAST TREATMENT DATE: NORMAL
RAD ONC ARIA COURSE START DATE: NORMAL
RAD ONC ARIA COURSE TREATMENT ELAPSED DAYS: 11
RAD ONC ARIA FIRST TREATMENT DATE: NORMAL
RAD ONC ARIA PLAN FRACTIONS TREATED TO DATE: 10
RAD ONC ARIA PLAN ID: NORMAL
RAD ONC ARIA PLAN PRESCRIBED DOSE PER FRACTION: 1.8 GY
RAD ONC ARIA PLAN PRIMARY REFERENCE POINT: NORMAL
RAD ONC ARIA PLAN TOTAL FRACTIONS PRESCRIBED: 25
RAD ONC ARIA PLAN TOTAL PRESCRIBED DOSE: 4500 CGY
RAD ONC ARIA REFERENCE POINT DOSAGE GIVEN TO DATE: 18 GY
RAD ONC ARIA REFERENCE POINT ID: NORMAL
RAD ONC ARIA REFERENCE POINT SESSION DOSAGE GIVEN: 1.8 GY

## 2022-09-30 PROCEDURE — 77386: CPT | Performed by: RADIOLOGY

## 2022-09-30 PROCEDURE — 77014 CHG CT GUIDANCE RADIATION THERAPY FLDS PLACEMENT: CPT | Performed by: RADIOLOGY

## 2022-10-03 ENCOUNTER — OFFICE VISIT (OUTPATIENT)
Dept: PSYCHIATRY | Facility: CLINIC | Age: 47
End: 2022-10-03

## 2022-10-03 ENCOUNTER — HOSPITAL ENCOUNTER (OUTPATIENT)
Dept: RADIATION ONCOLOGY | Facility: HOSPITAL | Age: 47
Discharge: HOME | End: 2022-10-03

## 2022-10-03 ENCOUNTER — HOSPITAL ENCOUNTER (OUTPATIENT)
Dept: RADIATION ONCOLOGY | Facility: HOSPITAL | Age: 47
Setting detail: RADIATION/ONCOLOGY SERIES
End: 2022-10-03

## 2022-10-03 DIAGNOSIS — F43.22 ADJUSTMENT DISORDER WITH ANXIOUS MOOD: ICD-10-CM

## 2022-10-03 LAB
RAD ONC ARIA COURSE ID: NORMAL
RAD ONC ARIA COURSE LAST TREATMENT DATE: NORMAL
RAD ONC ARIA COURSE START DATE: NORMAL
RAD ONC ARIA COURSE TREATMENT ELAPSED DAYS: 14
RAD ONC ARIA FIRST TREATMENT DATE: NORMAL
RAD ONC ARIA PLAN FRACTIONS TREATED TO DATE: 11
RAD ONC ARIA PLAN ID: NORMAL
RAD ONC ARIA PLAN PRESCRIBED DOSE PER FRACTION: 1.8 GY
RAD ONC ARIA PLAN PRIMARY REFERENCE POINT: NORMAL
RAD ONC ARIA PLAN TOTAL FRACTIONS PRESCRIBED: 25
RAD ONC ARIA PLAN TOTAL PRESCRIBED DOSE: 4500 CGY
RAD ONC ARIA REFERENCE POINT DOSAGE GIVEN TO DATE: 19.8 GY
RAD ONC ARIA REFERENCE POINT ID: NORMAL
RAD ONC ARIA REFERENCE POINT SESSION DOSAGE GIVEN: 1.8 GY

## 2022-10-03 PROCEDURE — 77386: CPT | Performed by: RADIOLOGY

## 2022-10-03 PROCEDURE — 77014 CHG CT GUIDANCE RADIATION THERAPY FLDS PLACEMENT: CPT | Performed by: RADIOLOGY

## 2022-10-03 PROCEDURE — 90791 PSYCH DIAGNOSTIC EVALUATION: CPT | Performed by: SOCIAL WORKER

## 2022-10-03 PROCEDURE — 77427 RADIATION TX MANAGEMENT X5: CPT | Performed by: RADIOLOGY
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
## 2022-10-04 ENCOUNTER — HOSPITAL ENCOUNTER (OUTPATIENT)
Dept: RADIATION ONCOLOGY | Facility: HOSPITAL | Age: 47
Discharge: HOME | End: 2022-10-04

## 2022-10-04 LAB
RAD ONC ARIA COURSE ID: NORMAL
RAD ONC ARIA COURSE LAST TREATMENT DATE: NORMAL
RAD ONC ARIA COURSE START DATE: NORMAL
RAD ONC ARIA COURSE TREATMENT ELAPSED DAYS: 15
RAD ONC ARIA FIRST TREATMENT DATE: NORMAL
RAD ONC ARIA PLAN FRACTIONS TREATED TO DATE: 12
RAD ONC ARIA PLAN ID: NORMAL
RAD ONC ARIA PLAN PRESCRIBED DOSE PER FRACTION: 1.8 GY
RAD ONC ARIA PLAN PRIMARY REFERENCE POINT: NORMAL
RAD ONC ARIA PLAN TOTAL FRACTIONS PRESCRIBED: 25
RAD ONC ARIA PLAN TOTAL PRESCRIBED DOSE: 4500 CGY
RAD ONC ARIA REFERENCE POINT DOSAGE GIVEN TO DATE: 21.6 GY
RAD ONC ARIA REFERENCE POINT ID: NORMAL
RAD ONC ARIA REFERENCE POINT SESSION DOSAGE GIVEN: 1.8 GY

## 2022-10-04 PROCEDURE — 77014 CHG CT GUIDANCE RADIATION THERAPY FLDS PLACEMENT: CPT | Performed by: RADIOLOGY

## 2022-10-04 PROCEDURE — 77386: CPT | Performed by: RADIOLOGY

## 2022-10-05 ENCOUNTER — HOSPITAL ENCOUNTER (OUTPATIENT)
Dept: RADIATION ONCOLOGY | Facility: HOSPITAL | Age: 47
Discharge: HOME | End: 2022-10-05

## 2022-10-05 ENCOUNTER — RADIATION ONCOLOGY WEEKLY ASSESSMENT (OUTPATIENT)
Dept: RADIATION ONCOLOGY | Facility: HOSPITAL | Age: 47
End: 2022-10-05

## 2022-10-05 VITALS
DIASTOLIC BLOOD PRESSURE: 76 MMHG | HEIGHT: 69 IN | HEART RATE: 91 BPM | SYSTOLIC BLOOD PRESSURE: 135 MMHG | BODY MASS INDEX: 32.53 KG/M2 | OXYGEN SATURATION: 99 % | RESPIRATION RATE: 18 BRPM | TEMPERATURE: 96.9 F | WEIGHT: 219.6 LBS

## 2022-10-05 DIAGNOSIS — R19.7 DIARRHEA, UNSPECIFIED TYPE: Primary | ICD-10-CM

## 2022-10-05 DIAGNOSIS — C20 RECTAL CANCER: ICD-10-CM

## 2022-10-05 LAB
RAD ONC ARIA COURSE ID: NORMAL
RAD ONC ARIA COURSE LAST TREATMENT DATE: NORMAL
RAD ONC ARIA COURSE START DATE: NORMAL
RAD ONC ARIA COURSE TREATMENT ELAPSED DAYS: 16
RAD ONC ARIA FIRST TREATMENT DATE: NORMAL
RAD ONC ARIA PLAN FRACTIONS TREATED TO DATE: 13
RAD ONC ARIA PLAN ID: NORMAL
RAD ONC ARIA PLAN PRESCRIBED DOSE PER FRACTION: 1.8 GY
RAD ONC ARIA PLAN PRIMARY REFERENCE POINT: NORMAL
RAD ONC ARIA PLAN TOTAL FRACTIONS PRESCRIBED: 25
RAD ONC ARIA PLAN TOTAL PRESCRIBED DOSE: 4500 CGY
RAD ONC ARIA REFERENCE POINT DOSAGE GIVEN TO DATE: 23.4 GY
RAD ONC ARIA REFERENCE POINT ID: NORMAL
RAD ONC ARIA REFERENCE POINT SESSION DOSAGE GIVEN: 1.8 GY

## 2022-10-05 PROCEDURE — FACE2FACE: Performed by: RADIOLOGY

## 2022-10-05 PROCEDURE — 77014 CHG CT GUIDANCE RADIATION THERAPY FLDS PLACEMENT: CPT | Performed by: RADIOLOGY

## 2022-10-05 PROCEDURE — 77386: CPT | Performed by: RADIOLOGY

## 2022-10-05 RX ORDER — DIPHENOXYLATE HYDROCHLORIDE AND ATROPINE SULFATE 2.5; .025 MG/1; MG/1
1 TABLET ORAL 4 TIMES DAILY PRN
Qty: 40 TABLET | Refills: 0 | Status: SHIPPED | OUTPATIENT
Start: 2022-10-05 | End: 2023-03-08

## 2022-10-05 NOTE — PROGRESS NOTES
Patient Name: Yun Mcgowan  : 1975  MRN #: [id number]          RADIATION ON TREATMENT VISIT NOTE    DIAGNOSIS:    Diagnosis Plan   1. Diarrhea, unspecified type  diphenoxylate-atropine (LOMOTIL) 2.5-0.025 MG per tablet        Cancer Staging  Rectal cancer (HCC)  Staging form: Colon And Rectum, AJCC 8th Edition  - Clinical: Stage IIIB (cT3, cN2a, cM0) - Unsigned       TREATMENT COURSE:   Oncology/Hematology History   Rectal cancer (HCC)   2022 Initial Diagnosis    Rectal cancer (HCC)     2022 Procedure    Colonoscopy    MMR intact     2022 Imaging    CT Chest/Abd/Pel  No pulmonary mets  Cirrhotic liver, 4.2 x 4.8cm hypodense exophytic lesion along right inferior hepatic lobe (unchanged), similar exophytic 4.3 cm lesion along left lateral hepatic segment (unchanged),  7mm clementina-rectal LN  6.6cm left adnexal cyst     2022 Imaging    MRI Pelvis  FINDINGS: There is a circumferential soft tissue mass encircling the posterior mid rectum from the 1:00 to 10:00 position, approximately 8.4 cm above the anal verge. Craniocaudal extent of the mass measures approximately 4.8 cm. The mass invades through the muscularis propria and into the mesorectal fat along its left posterior lateral border as well as likely along its right lateral border. Tumor extends approximately 3 mm beyond the muscularis propria.. The tumor does not reach the mesorectal fascia.     There are at least 3 greater than 5 mm left-sided perirectal lymph nodes and 2 right-sided greater than 5 mm perirectal lymph node seen. These lymph nodes demonstrate enhancement on post gadolinium images with questionable mild restricted diffusion. No distant adenopathy is seen.     Urinary bladder and uterus appear free of disease.     There is a 5.9 cm cystic lesion in the left adnexa. Left ovary appears otherwise within normal limits. Right ovary is not definitely visualized.       2022 -  Chemotherapy    FOLFOX  Cycle 4 given 22      "      [x] Concurrent Chemo   Regimen: Bolus 5-FU? - Pt states infusion on weeks 1 and 5       Treatment Site/Current Radiation Dose:  Radiation Treatments     Active   Plans   Pelvis Init   Most recent treatment: Dose planned: 180 cGy (fraction 13 on 10/5/2022)   Total: Dose planned: 4,500 cGy (25 fractions)   Elapsed Days: 16      Reference Points   Pelvis Init   Most recent treatment: Dose given: 180 cGy (on 10/5/2022)   Total: Dose given: 2,340 cGy   Elapsed Days: 16                   Current Radiation Dose:   2340 cGy    Subjective:  Last week started having urinary urgency and burning  UA taken and suggestive of contaminated specimen with mixed bacterial marco  Completing course of empiric antibiotics with little relief    Having frequent watery stools starting 3 days ago  States she was having watery diarrhea every hour of the night which improved with imodium  Received IVF at medical oncologist yesterday  Taking 3 imodium/day      EXAM  KPS: 90    Vitals:    10/05/22 1351   BP: 135/76   Pulse: 91   Resp: 18   Temp: 96.9 °F (36.1 °C)   TempSrc: Temporal   SpO2: 99%   Weight: 99.6 kg (219 lb 9.6 oz)   Height: 175.3 cm (69\")     Pain Score    10/05/22 1351   PainSc: 0-No pain       Weight:   Wt Readings from Last 3 Encounters:   10/05/22 99.6 kg (219 lb 9.6 oz)   09/28/22 99.8 kg (220 lb)   09/28/22 99.6 kg (219 lb 9.6 oz)       NAD  Skin hyperpigmentation in clementina-anal region      LABS (Reviewed):    Hematology  WBC   Date Value Ref Range Status   05/23/2022 8.60 3.40 - 10.80 10*3/mm3 Final   11/07/2020 10.30 4.5 - 11.0 10*3/uL Final     RBC   Date Value Ref Range Status   05/23/2022 4.49 3.77 - 5.28 10*6/mm3 Final   11/07/2020 4.17 4.0 - 5.2 10*6/uL Final     Hemoglobin   Date Value Ref Range Status   05/23/2022 13.0 12.0 - 15.9 g/dL Final   11/07/2020 12.4 12.0 - 16.0 g/dL Final     Hematocrit   Date Value Ref Range Status   05/23/2022 38.8 34.0 - 46.6 % Final   11/07/2020 36.8 36.0 - 46.0 % Final "     Platelets   Date Value Ref Range Status   05/23/2022 254 140 - 450 10*3/mm3 Final   11/07/2020 233 140 - 440 10*3/uL Final       Chemistry No results found for: GLUCOSE, BUN, CREATININE, EGFRIFNONA, EGFRIFAFRI, BCR, K, CO2, CALCIUM, PROTENTOTREF, ALBUMIN, LABIL2, BILIRUBIN, AST, ALT      Medication:     Current Outpatient Medications:   •  acetaminophen-codeine (TYLENOL with CODEINE #3) 300-30 MG per tablet, Take 1 tablet by mouth., Disp: , Rfl:   •  ALPRAZolam (XANAX) 0.5 MG tablet, Take 0.5 mg by mouth 2 (Two) Times a Day As Needed for Anxiety., Disp: , Rfl:   •  ALPRAZolam (XANAX) 0.5 MG tablet, Take 0.5 mg by mouth., Disp: , Rfl:   •  ascorbic acid (VITAMIN C) 250 MG tablet, Take 500 mg by mouth Daily., Disp: , Rfl:   •  ascorbic acid (VITAMIN C) 500 MG tablet, Take 500 mg by mouth Daily., Disp: , Rfl:   •  baclofen (LIORESAL) 10 MG tablet, Take 10 mg by mouth 3 (Three) Times a Day., Disp: , Rfl:   •  buPROPion XL (WELLBUTRIN XL) 300 MG 24 hr tablet, Take 300 mg by mouth Every Morning., Disp: , Rfl:   •  cholecalciferol (Cholecalciferol) 25 MCG (1000 UT) tablet, Take 5,000 Units by mouth Daily., Disp: , Rfl:   •  Continuous Blood Gluc Sensor (FreeStyle Fang 2 Sensor) [facility] [place] USE ONE SENSOR EVERY 14 (FOURTEEN) DAYS AS DIRECTED, Disp: , Rfl:   •  cyclobenzaprine (FLEXERIL) 10 MG tablet, Take 10 mg by mouth., Disp: , Rfl:   •  desvenlafaxine (Pristiq) 50 MG 24 hr tablet, Take 1 tablet by mouth Every Morning., Disp: 90 tablet, Rfl: 1  •  dicyclomine (BENTYL) 10 MG capsule, Take 10 mg by mouth 4 (Four) Times a Day., Disp: , Rfl:   •  diphenoxylate-atropine (LOMOTIL) 2.5-0.025 MG per tablet, Take 1 tablet by mouth 4 (Four) Times a Day As Needed for Diarrhea., Disp: 40 tablet, Rfl: 0  •  fexofenadine (ALLEGRA) 60 MG tablet, Take 60 mg by mouth Daily., Disp: , Rfl:   •  ibuprofen (ADVIL,MOTRIN) 800 MG tablet, Take  by mouth., Disp: , Rfl:   •  Insulin Degludec (Tresiba FlexTouch) 200 UNIT/ML solution pen-injector pen  injection, Inject 80 Units under the skin into the appropriate area as directed Daily., Disp: , Rfl:   •  Insulin Lispro (humaLOG) 100 UNIT/ML injection, Inject 30 Units under the skin into the appropriate area as directed 3 (Three) Times a Day Before Meals., Disp: , Rfl:   •  lactulose (CHRONULAC) 10 GM/15ML solution, TAKE 30 MILLILITER BY ORAL ROUTE EVERY DAY FOR CONSTIPATION, Disp: , Rfl:   •  levonorgestrel-ethinyl estradiol (NORDETTE) 0.15-30 MG-MCG per tablet, Take 1 tablet by mouth Daily., Disp: , Rfl:   •  Levothyroxine Sodium (SYNTHROID PO), SYNTHROID TABS, Disp: , Rfl:   •  lisinopril (PRINIVIL,ZESTRIL) 10 MG tablet, Take 10 mg by mouth Daily., Disp: , Rfl:   •  magnesium gluconate (MAGONATE) 500 MG tablet, Take 27 mg by mouth Daily., Disp: , Rfl:   •  Melatonin 10 MG tablet, Take  by mouth., Disp: , Rfl:   •  multivitamin with minerals tablet tablet, Take 1 tablet by mouth Daily., Disp: , Rfl:   •  Naltrexone HCl (DEPADE PO), Take 4 mg by mouth Every Night., Disp: , Rfl:   •  nitrofurantoin, macrocrystal-monohydrate, (Macrobid) 100 MG capsule, Take 1 capsule by mouth 2 (Two) Times a Day., Disp: 14 capsule, Rfl: 0  •  norethindrone (MICRONOR) 0.35 MG tablet, Take 1 tablet by mouth Daily., Disp: , Rfl:   •  ondansetron (ZOFRAN) 4 MG tablet, TAKE 1 TABLET BY ORAL ROUTE EVERY 4- 6 HOURS AS NEEDED FOR NAUSEA, Disp: , Rfl:   •  pantoprazole (PROTONIX) 40 MG EC tablet, Take 40 mg by mouth Daily., Disp: , Rfl:   •  phentermine (ADIPEX-P) 37.5 MG tablet, Take 37.5 mg by mouth Every Morning Before Breakfast., Disp: , Rfl:   •  promethazine (PHENERGAN) 25 MG tablet, TAKE 1 TABLET BY MOUTH EVERY 4 TO 6 HOURS AS NEEDED, Disp: , Rfl:   •  rizatriptan (MAXALT) 10 MG tablet, Take 10 mg by mouth 1 (One) Time As Needed for Migraine. May repeat in 2 hours if needed, Disp: , Rfl:   •  Semaglutide, 1 MG/DOSE, (Ozempic, 1 MG/DOSE,) 2 MG/1.5ML solution pen-injector, Inject 1 mg under the skin into the appropriate area as  directed 1 (One) Time Per Week., Disp: , Rfl:   •  sorbitol 70 % solution, Take 15 mL by mouth Daily As Needed., Disp: , Rfl:   •  Thyroid 90 MG PO tablet, Take 180 mg by mouth Daily., Disp: , Rfl:   •  topiramate (TOPAMAX) 25 MG capsule (sprinkle), Take 25 mg by mouth 2 (Two) Times a Day., Disp: , Rfl:   •  vitamin B-12 (CYANOCOBALAMIN) 1000 MCG tablet, Take 1,000 mcg by mouth Daily., Disp: , Rfl:   •  vitamin D3 125 MCG (5000 UT) capsule capsule, Take 5,000 Units by mouth Daily., Disp: , Rfl:       PAIN:   Yun Mcgowan reports a pain score of 0.  Given her pain assessment as noted, treatment options were discussed and the following options were decided upon as a follow-up plan to address the patient's pain: continuation of current treatment plan for pain.        Patient chart including appropriate labs, setup images, treatment parameters, delivered dose have been reviewed      Recommendations:   [x] Continue RT  [] Change RT Plan [] Hold RT, length:        Pt to start azo/cranberry tabs  Use aquaphor for skin irritation  Start lomotil for ddiarrhea      Approved Electronically By:  Chema Araiza MD, 10/5/2022, 14:17 EDT

## 2022-10-06 ENCOUNTER — HOSPITAL ENCOUNTER (OUTPATIENT)
Dept: RADIATION ONCOLOGY | Facility: HOSPITAL | Age: 47
Discharge: HOME | End: 2022-10-06

## 2022-10-06 LAB
RAD ONC ARIA COURSE ID: NORMAL
RAD ONC ARIA COURSE LAST TREATMENT DATE: NORMAL
RAD ONC ARIA COURSE START DATE: NORMAL
RAD ONC ARIA COURSE TREATMENT ELAPSED DAYS: 17
RAD ONC ARIA FIRST TREATMENT DATE: NORMAL
RAD ONC ARIA PLAN FRACTIONS TREATED TO DATE: 14
RAD ONC ARIA PLAN ID: NORMAL
RAD ONC ARIA PLAN PRESCRIBED DOSE PER FRACTION: 1.8 GY
RAD ONC ARIA PLAN PRIMARY REFERENCE POINT: NORMAL
RAD ONC ARIA PLAN TOTAL FRACTIONS PRESCRIBED: 25
RAD ONC ARIA PLAN TOTAL PRESCRIBED DOSE: 4500 CGY
RAD ONC ARIA REFERENCE POINT DOSAGE GIVEN TO DATE: 25.2 GY
RAD ONC ARIA REFERENCE POINT ID: NORMAL
RAD ONC ARIA REFERENCE POINT SESSION DOSAGE GIVEN: 1.8 GY

## 2022-10-06 PROCEDURE — 77386: CPT | Performed by: RADIOLOGY

## 2022-10-06 PROCEDURE — 77336 RADIATION PHYSICS CONSULT: CPT | Performed by: RADIOLOGY

## 2022-10-06 PROCEDURE — 77014 CHG CT GUIDANCE RADIATION THERAPY FLDS PLACEMENT: CPT | Performed by: RADIOLOGY

## 2022-10-07 ENCOUNTER — HOSPITAL ENCOUNTER (OUTPATIENT)
Dept: RADIATION ONCOLOGY | Facility: HOSPITAL | Age: 47
Discharge: HOME | End: 2022-10-07

## 2022-10-07 LAB
RAD ONC ARIA COURSE ID: NORMAL
RAD ONC ARIA COURSE LAST TREATMENT DATE: NORMAL
RAD ONC ARIA COURSE START DATE: NORMAL
RAD ONC ARIA COURSE TREATMENT ELAPSED DAYS: 18
RAD ONC ARIA FIRST TREATMENT DATE: NORMAL
RAD ONC ARIA PLAN FRACTIONS TREATED TO DATE: 15
RAD ONC ARIA PLAN ID: NORMAL
RAD ONC ARIA PLAN PRESCRIBED DOSE PER FRACTION: 1.8 GY
RAD ONC ARIA PLAN PRIMARY REFERENCE POINT: NORMAL
RAD ONC ARIA PLAN TOTAL FRACTIONS PRESCRIBED: 25
RAD ONC ARIA PLAN TOTAL PRESCRIBED DOSE: 4500 CGY
RAD ONC ARIA REFERENCE POINT DOSAGE GIVEN TO DATE: 27 GY
RAD ONC ARIA REFERENCE POINT ID: NORMAL
RAD ONC ARIA REFERENCE POINT SESSION DOSAGE GIVEN: 1.8 GY

## 2022-10-07 PROCEDURE — 77014 CHG CT GUIDANCE RADIATION THERAPY FLDS PLACEMENT: CPT | Performed by: RADIOLOGY

## 2022-10-07 PROCEDURE — 77386: CPT | Performed by: RADIOLOGY

## 2022-10-10 ENCOUNTER — HOSPITAL ENCOUNTER (OUTPATIENT)
Dept: RADIATION ONCOLOGY | Facility: HOSPITAL | Age: 47
Discharge: HOME | End: 2022-10-10

## 2022-10-10 ENCOUNTER — LAB (OUTPATIENT)
Dept: LAB | Facility: HOSPITAL | Age: 47
End: 2022-10-10

## 2022-10-10 DIAGNOSIS — K62.5 RECTAL BLEEDING: ICD-10-CM

## 2022-10-10 DIAGNOSIS — C20 RECTAL CANCER: Primary | ICD-10-CM

## 2022-10-10 DIAGNOSIS — C20 RECTAL CANCER: ICD-10-CM

## 2022-10-10 LAB
ANION GAP SERPL CALCULATED.3IONS-SCNC: 13 MMOL/L (ref 5–15)
BASOPHILS # BLD AUTO: 0.01 10*3/MM3 (ref 0–0.2)
BASOPHILS NFR BLD AUTO: 0.2 % (ref 0–1.5)
BUN SERPL-MCNC: 6 MG/DL (ref 6–20)
BUN/CREAT SERPL: 9.1 (ref 7–25)
CALCIUM SPEC-SCNC: 9.2 MG/DL (ref 8.6–10.5)
CHLORIDE SERPL-SCNC: 101 MMOL/L (ref 98–107)
CO2 SERPL-SCNC: 22 MMOL/L (ref 22–29)
CREAT SERPL-MCNC: 0.66 MG/DL (ref 0.57–1)
DEPRECATED RDW RBC AUTO: 51.8 FL (ref 37–54)
EGFRCR SERPLBLD CKD-EPI 2021: 109 ML/MIN/1.73
EOSINOPHIL # BLD AUTO: 0.09 10*3/MM3 (ref 0–0.4)
EOSINOPHIL NFR BLD AUTO: 2.1 % (ref 0.3–6.2)
ERYTHROCYTE [DISTWIDTH] IN BLOOD BY AUTOMATED COUNT: 16.1 % (ref 12.3–15.4)
GLUCOSE SERPL-MCNC: 206 MG/DL (ref 65–99)
HCT VFR BLD AUTO: 35.4 % (ref 34–46.6)
HGB BLD-MCNC: 11.9 G/DL (ref 12–15.9)
LYMPHOCYTES # BLD AUTO: 0.16 10*3/MM3 (ref 0.7–3.1)
LYMPHOCYTES NFR BLD AUTO: 3.8 % (ref 19.6–45.3)
MCH RBC QN AUTO: 30.9 PG (ref 26.6–33)
MCHC RBC AUTO-ENTMCNC: 33.6 G/DL (ref 31.5–35.7)
MCV RBC AUTO: 91.9 FL (ref 79–97)
MONOCYTES # BLD AUTO: 0.41 10*3/MM3 (ref 0.1–0.9)
MONOCYTES NFR BLD AUTO: 9.8 % (ref 5–12)
NEUTROPHILS NFR BLD AUTO: 3.53 10*3/MM3 (ref 1.7–7)
NEUTROPHILS NFR BLD AUTO: 84.1 % (ref 42.7–76)
PLATELET # BLD AUTO: 109 10*3/MM3 (ref 140–450)
PMV BLD AUTO: 11 FL (ref 6–12)
POTASSIUM SERPL-SCNC: 4 MMOL/L (ref 3.5–5.2)
RAD ONC ARIA COURSE ID: NORMAL
RAD ONC ARIA COURSE LAST TREATMENT DATE: NORMAL
RAD ONC ARIA COURSE START DATE: NORMAL
RAD ONC ARIA COURSE TREATMENT ELAPSED DAYS: 21
RAD ONC ARIA FIRST TREATMENT DATE: NORMAL
RAD ONC ARIA PLAN FRACTIONS TREATED TO DATE: 16
RAD ONC ARIA PLAN ID: NORMAL
RAD ONC ARIA PLAN PRESCRIBED DOSE PER FRACTION: 1.8 GY
RAD ONC ARIA PLAN PRIMARY REFERENCE POINT: NORMAL
RAD ONC ARIA PLAN TOTAL FRACTIONS PRESCRIBED: 25
RAD ONC ARIA PLAN TOTAL PRESCRIBED DOSE: 4500 CGY
RAD ONC ARIA REFERENCE POINT DOSAGE GIVEN TO DATE: 28.8 GY
RAD ONC ARIA REFERENCE POINT ID: NORMAL
RAD ONC ARIA REFERENCE POINT SESSION DOSAGE GIVEN: 1.8 GY
RBC # BLD AUTO: 3.85 10*6/MM3 (ref 3.77–5.28)
SODIUM SERPL-SCNC: 136 MMOL/L (ref 136–145)
WBC NRBC COR # BLD: 4.2 10*3/MM3 (ref 3.4–10.8)

## 2022-10-10 PROCEDURE — 77014 CHG CT GUIDANCE RADIATION THERAPY FLDS PLACEMENT: CPT | Performed by: RADIOLOGY

## 2022-10-10 PROCEDURE — 36415 COLL VENOUS BLD VENIPUNCTURE: CPT

## 2022-10-10 PROCEDURE — 77427 RADIATION TX MANAGEMENT X5: CPT | Performed by: RADIOLOGY

## 2022-10-10 PROCEDURE — 85025 COMPLETE CBC W/AUTO DIFF WBC: CPT

## 2022-10-10 PROCEDURE — 77386: CPT | Performed by: RADIOLOGY

## 2022-10-10 PROCEDURE — 80048 BASIC METABOLIC PNL TOTAL CA: CPT

## 2022-10-11 ENCOUNTER — HOSPITAL ENCOUNTER (OUTPATIENT)
Dept: RADIATION ONCOLOGY | Facility: HOSPITAL | Age: 47
Discharge: HOME | End: 2022-10-11

## 2022-10-11 LAB
RAD ONC ARIA COURSE ID: NORMAL
RAD ONC ARIA COURSE LAST TREATMENT DATE: NORMAL
RAD ONC ARIA COURSE START DATE: NORMAL
RAD ONC ARIA COURSE TREATMENT ELAPSED DAYS: 22
RAD ONC ARIA FIRST TREATMENT DATE: NORMAL
RAD ONC ARIA PLAN FRACTIONS TREATED TO DATE: 17
RAD ONC ARIA PLAN ID: NORMAL
RAD ONC ARIA PLAN PRESCRIBED DOSE PER FRACTION: 1.8 GY
RAD ONC ARIA PLAN PRIMARY REFERENCE POINT: NORMAL
RAD ONC ARIA PLAN TOTAL FRACTIONS PRESCRIBED: 25
RAD ONC ARIA PLAN TOTAL PRESCRIBED DOSE: 4500 CGY
RAD ONC ARIA REFERENCE POINT DOSAGE GIVEN TO DATE: 30.6 GY
RAD ONC ARIA REFERENCE POINT ID: NORMAL
RAD ONC ARIA REFERENCE POINT SESSION DOSAGE GIVEN: 1.8 GY

## 2022-10-11 PROCEDURE — 77386: CPT | Performed by: RADIOLOGY

## 2022-10-11 PROCEDURE — 77014 CHG CT GUIDANCE RADIATION THERAPY FLDS PLACEMENT: CPT | Performed by: RADIOLOGY

## 2022-10-12 ENCOUNTER — HOSPITAL ENCOUNTER (OUTPATIENT)
Dept: RADIATION ONCOLOGY | Facility: HOSPITAL | Age: 47
Discharge: HOME | End: 2022-10-12

## 2022-10-12 ENCOUNTER — RADIATION ONCOLOGY WEEKLY ASSESSMENT (OUTPATIENT)
Dept: RADIATION ONCOLOGY | Facility: HOSPITAL | Age: 47
End: 2022-10-12

## 2022-10-12 VITALS
HEART RATE: 99 BPM | DIASTOLIC BLOOD PRESSURE: 81 MMHG | BODY MASS INDEX: 32.19 KG/M2 | WEIGHT: 218 LBS | RESPIRATION RATE: 20 BRPM | OXYGEN SATURATION: 97 % | SYSTOLIC BLOOD PRESSURE: 141 MMHG

## 2022-10-12 DIAGNOSIS — C20 RECTAL CANCER: Primary | ICD-10-CM

## 2022-10-12 LAB
RAD ONC ARIA COURSE ID: NORMAL
RAD ONC ARIA COURSE LAST TREATMENT DATE: NORMAL
RAD ONC ARIA COURSE START DATE: NORMAL
RAD ONC ARIA COURSE TREATMENT ELAPSED DAYS: 23
RAD ONC ARIA FIRST TREATMENT DATE: NORMAL
RAD ONC ARIA PLAN FRACTIONS TREATED TO DATE: 18
RAD ONC ARIA PLAN ID: NORMAL
RAD ONC ARIA PLAN PRESCRIBED DOSE PER FRACTION: 1.8 GY
RAD ONC ARIA PLAN PRIMARY REFERENCE POINT: NORMAL
RAD ONC ARIA PLAN TOTAL FRACTIONS PRESCRIBED: 25
RAD ONC ARIA PLAN TOTAL PRESCRIBED DOSE: 4500 CGY
RAD ONC ARIA REFERENCE POINT DOSAGE GIVEN TO DATE: 32.4 GY
RAD ONC ARIA REFERENCE POINT ID: NORMAL
RAD ONC ARIA REFERENCE POINT SESSION DOSAGE GIVEN: 1.8 GY

## 2022-10-12 PROCEDURE — 77014 CHG CT GUIDANCE RADIATION THERAPY FLDS PLACEMENT: CPT | Performed by: RADIOLOGY

## 2022-10-12 PROCEDURE — FACE2FACE: Performed by: RADIOLOGY

## 2022-10-12 PROCEDURE — 77386: CPT | Performed by: RADIOLOGY

## 2022-10-12 RX ORDER — HYDROCORTISONE ACETATE 25 MG/1
25 SUPPOSITORY RECTAL 2 TIMES DAILY
Qty: 24 SUPPOSITORY | Refills: 1 | Status: SHIPPED | OUTPATIENT
Start: 2022-10-12

## 2022-10-12 RX ORDER — PRAMOXINE HYDROCHLORIDE HYDROCORTISONE ACETATE 100; 100 MG/10G; MG/10G
1 AEROSOL, FOAM TOPICAL 2 TIMES DAILY
Qty: 10 G | Refills: 2 | Status: SHIPPED | OUTPATIENT
Start: 2022-10-12 | End: 2022-12-06

## 2022-10-12 NOTE — PROGRESS NOTES
Patient Name: Yun Mcgowan  : 1975  MRN #: [id number]          RADIATION ON TREATMENT VISIT NOTE    DIAGNOSIS:    Diagnosis Plan   1. Rectal cancer (HCC)             Cancer Staging  Rectal cancer (HCC)  Staging form:   Colon And Rectum, AJCC 8th Edition  - Clinical: Stage IIIB (cT3, cN2a, cM0) - Unsigned       TREATMENT COURSE:   Oncology/Hematology History   Rectal cancer (HCC)   2022 Initial Diagnosis    Rectal cancer (HCC)     2022 Procedure    Colonoscopy    MMR intact     2022 Imaging    CT Chest/Abd/Pel  No pulmonary mets  Cirrhotic liver, 4.2 x 4.8cm hypodense exophytic lesion along right inferior hepatic lobe (unchanged), similar exophytic 4.3 cm lesion along left lateral hepatic segment (unchanged),  7mm clementina-rectal LN  6.6cm left adnexal cyst     2022 Imaging    MRI Pelvis  FINDINGS: There is a circumferential soft tissue mass encircling the posterior mid rectum from the 1:00 to 10:00 position, approximately 8.4 cm above the anal verge. Craniocaudal extent of the mass measures approximately 4.8 cm. The mass invades through the muscularis propria and into the mesorectal fat along its left posterior lateral border as well as likely along its right lateral border. Tumor extends approximately 3 mm beyond the muscularis propria.. The tumor does not reach the mesorectal fascia.     There are at least 3 greater than 5 mm left-sided perirectal lymph nodes and 2 right-sided greater than 5 mm perirectal lymph node seen. These lymph nodes demonstrate enhancement on post gadolinium images with questionable mild restricted diffusion. No distant adenopathy is seen.     Urinary bladder and uterus appear free of disease.     There is a 5.9 cm cystic lesion in the left adnexa. Left ovary appears otherwise within normal limits. Right ovary is not definitely visualized.       2022 -  Chemotherapy    FOLFOX  Cycle 4 given 22           [x] Concurrent Chemo   Regimen: Bolus 5-FU? - Pt states  infusion on weeks 1 and 5       Treatment Site/Current Radiation Dose:  Radiation Treatments     Active   Plans   Pelvis Init   Most recent treatment: Dose planned: 180 cGy (fraction 18 on 10/12/2022)   Total: Dose planned: 4,500 cGy (25 fractions)   Elapsed Days: 23      Reference Points   Pelvis Init   Most recent treatment: Dose given: 180 cGy (on 10/12/2022)   Total: Dose given: 3,240 cGy   Elapsed Days: 23                   Current Radiation Dose:   3240 cGy    Subjective:  Doing better today  Has increased fluid intake and continues to have 4-5 BMs daily which are painful, sometimes liquid, sometimes semi-solid  Will no longer take lomotil due to constipation, straining, and prior rectal bleeding  Dysuria improved with cranberry juice  Skin irritation improved with silvadene    10/5/22  Last week started having urinary urgency and burning  UA taken and suggestive of contaminated specimen with mixed bacterial marco  Completing course of empiric antibiotics with little relief    Having frequent watery stools starting 3 days ago  States she was having watery diarrhea every hour of the night which improved with imodium  Received IVF at medical oncologist yesterday  Taking 3 imodium/day      EXAM  KPS: 90    Vitals:    10/12/22 1349   BP: 141/81   Pulse: 99   Resp: 20   SpO2: 97%   Weight: 98.9 kg (218 lb)     Pain Score    10/12/22 1349   PainSc:   3   PainLoc: Rectum       Weight:   Wt Readings from Last 3 Encounters:   10/12/22 98.9 kg (218 lb)   10/05/22 99.6 kg (219 lb 9.6 oz)   09/28/22 99.8 kg (220 lb)       NAD  Skin hyperpigmentation in clementina-anal region, no desquamation      LABS (Reviewed):    Hematology  WBC   Date Value Ref Range Status   10/10/2022 4.20 3.40 - 10.80 10*3/mm3 Final   11/07/2020 10.30 4.5 - 11.0 10*3/uL Final     RBC   Date Value Ref Range Status   10/10/2022 3.85 3.77 - 5.28 10*6/mm3 Final   11/07/2020 4.17 4.0 - 5.2 10*6/uL Final     Hemoglobin   Date Value Ref Range Status    10/10/2022 11.9 (L) 12.0 - 15.9 g/dL Final   11/07/2020 12.4 12.0 - 16.0 g/dL Final     Hematocrit   Date Value Ref Range Status   10/10/2022 35.4 34.0 - 46.6 % Final   11/07/2020 36.8 36.0 - 46.0 % Final     Platelets   Date Value Ref Range Status   10/10/2022 109 (L) 140 - 450 10*3/mm3 Final   11/07/2020 233 140 - 440 10*3/uL Final       Chemistry   Lab Results   Component Value Date    GLUCOSE 206 (H) 10/10/2022    BUN 6 10/10/2022    CREATININE 0.66 10/10/2022    BCR 9.1 10/10/2022    K 4.0 10/10/2022    CO2 22.0 10/10/2022    CALCIUM 9.2 10/10/2022         Medication:     Current Outpatient Medications:   •  acetaminophen-codeine (TYLENOL with CODEINE #3) 300-30 MG per tablet, Take 1 tablet by mouth., Disp: , Rfl:   •  ALPRAZolam (XANAX) 0.5 MG tablet, Take 0.5 mg by mouth 2 (Two) Times a Day As Needed for Anxiety., Disp: , Rfl:   •  ALPRAZolam (XANAX) 0.5 MG tablet, Take 0.5 mg by mouth., Disp: , Rfl:   •  ascorbic acid (VITAMIN C) 250 MG tablet, Take 500 mg by mouth Daily., Disp: , Rfl:   •  ascorbic acid (VITAMIN C) 500 MG tablet, Take 500 mg by mouth Daily., Disp: , Rfl:   •  baclofen (LIORESAL) 10 MG tablet, Take 10 mg by mouth 3 (Three) Times a Day., Disp: , Rfl:   •  buPROPion XL (WELLBUTRIN XL) 300 MG 24 hr tablet, Take 300 mg by mouth Every Morning., Disp: , Rfl:   •  cholecalciferol (Cholecalciferol) 25 MCG (1000 UT) tablet, Take 5,000 Units by mouth Daily., Disp: , Rfl:   •  Continuous Blood Gluc Sensor (FreeStyle Fang 2 Sensor) [facility] [place], USE ONE SENSOR EVERY 14 (FOURTEEN) DAYS AS DIRECTED, Disp: , Rfl:   •  cyclobenzaprine (FLEXERIL) 10 MG tablet, Take 10 mg by mouth., Disp: , Rfl:   •  desvenlafaxine (Pristiq) 50 MG 24 hr tablet, Take 1 tablet by mouth Every Morning., Disp: 90 tablet, Rfl: 1  •  dicyclomine (BENTYL) 10 MG capsule, Take 10 mg by mouth 4 (Four) Times a Day., Disp: , Rfl:   •  diphenoxylate-atropine (LOMOTIL) 2.5-0.025 MG per tablet, Take 1 tablet by mouth 4 (Four) Times a Day As  Needed for Diarrhea., Disp: 40 tablet, Rfl: 0  •  fexofenadine (ALLEGRA) 60 MG tablet, Take 60 mg by mouth Daily., Disp: , Rfl:   •  ibuprofen (ADVIL,MOTRIN) 800 MG tablet, Take  by mouth., Disp: , Rfl:   •  Insulin Degludec (Tresiba FlexTouch) 200 UNIT/ML solution pen-injector pen injection, Inject 80 Units under the skin into the appropriate area as directed Daily., Disp: , Rfl:   •  Insulin Lispro (humaLOG) 100 UNIT/ML injection, Inject 30 Units under the skin into the appropriate area as directed 3 (Three) Times a Day Before Meals., Disp: , Rfl:   •  lactulose (CHRONULAC) 10 GM/15ML solution, TAKE 30 MILLILITER BY ORAL ROUTE EVERY DAY FOR CONSTIPATION, Disp: , Rfl:   •  levonorgestrel-ethinyl estradiol (NORDETTE) 0.15-30 MG-MCG per tablet, Take 1 tablet by mouth Daily., Disp: , Rfl:   •  Levothyroxine Sodium (SYNTHROID PO), SYNTHROID TABS, Disp: , Rfl:   •  lisinopril (PRINIVIL,ZESTRIL) 10 MG tablet, Take 10 mg by mouth Daily., Disp: , Rfl:   •  magnesium gluconate (MAGONATE) 500 MG tablet, Take 27 mg by mouth Daily., Disp: , Rfl:   •  Melatonin 10 MG tablet, Take  by mouth., Disp: , Rfl:   •  multivitamin with minerals tablet tablet, Take 1 tablet by mouth Daily., Disp: , Rfl:   •  Naltrexone HCl (DEPADE PO), Take 4 mg by mouth Every Night., Disp: , Rfl:   •  nitrofurantoin, macrocrystal-monohydrate, (Macrobid) 100 MG capsule, Take 1 capsule by mouth 2 (Two) Times a Day., Disp: 14 capsule, Rfl: 0  •  norethindrone (MICRONOR) 0.35 MG tablet, Take 1 tablet by mouth Daily., Disp: , Rfl:   •  ondansetron (ZOFRAN) 4 MG tablet, TAKE 1 TABLET BY ORAL ROUTE EVERY 4- 6 HOURS AS NEEDED FOR NAUSEA, Disp: , Rfl:   •  pantoprazole (PROTONIX) 40 MG EC tablet, Take 40 mg by mouth Daily., Disp: , Rfl:   •  phentermine (ADIPEX-P) 37.5 MG tablet, Take 37.5 mg by mouth Every Morning Before Breakfast., Disp: , Rfl:   •  promethazine (PHENERGAN) 25 MG tablet, TAKE 1 TABLET BY MOUTH EVERY 4 TO 6 HOURS AS NEEDED, Disp: , Rfl:   •   rizatriptan (MAXALT) 10 MG tablet, Take 10 mg by mouth 1 (One) Time As Needed for Migraine. May repeat in 2 hours if needed, Disp: , Rfl:   •  Semaglutide, 1 MG/DOSE, (Ozempic, 1 MG/DOSE,) 2 MG/1.5ML solution pen-injector, Inject 1 mg under the skin into the appropriate area as directed 1 (One) Time Per Week., Disp: , Rfl:   •  sorbitol 70 % solution, Take 15 mL by mouth Daily As Needed., Disp: , Rfl:   •  Thyroid 90 MG PO tablet, Take 180 mg by mouth Daily., Disp: , Rfl:   •  topiramate (TOPAMAX) 25 MG capsule (sprinkle), Take 25 mg by mouth 2 (Two) Times a Day., Disp: , Rfl:   •  vitamin B-12 (CYANOCOBALAMIN) 1000 MCG tablet, Take 1,000 mcg by mouth Daily., Disp: , Rfl:   •  vitamin D3 125 MCG (5000 UT) capsule capsule, Take 5,000 Units by mouth Daily., Disp: , Rfl:       PAIN:   Yun Mcgowan reports a pain score of 3.  Given her pain assessment as noted, treatment options were discussed and the following options were decided upon as a follow-up plan to address the patient's pain: continuation of current treatment plan for pain.        Patient chart including appropriate labs, setup images, treatment parameters, delivered dose have been reviewed      Recommendations:   [x] Continue RT  [] Change RT Plan [] Hold RT, length:        Cont to start azo/cranberry tabs  Cont aquaphor/silvadene for skin irritation  Add Proctofoam or Anusol-HC for proctitis      Approved Electronically By:  Chema Araiza MD, 10/12/2022, 14:03 EDT

## 2022-10-12 NOTE — PROGRESS NOTES
Patient Name: Yun Mcgowan  : 1975  MRN #: [id number]                                                                                                                                                                                                                                                                                                                                                                                                                                                                      RADIATION ON TREATMENT VISIT NOTE     DIAGNOSIS:     Diagnosis Plan   1. Rectal cancer (HCC)                Cancer Staging  Rectal cancer (HCC)  Staging form:   Colon And Rectum, AJCC 8th Edition  - Clinical: Stage IIIB (cT3, cN2a, cM0) - Unsigned        TREATMENT COURSE:       Oncology/Hematology History   Rectal cancer (HCC)   2022 Initial Diagnosis     Rectal cancer (HCC)      2022 Procedure     Colonoscopy  MMR intact      2022 Imaging     CT Chest/Abd/Pel  No pulmonary mets  Cirrhotic liver, 4.2 x 4.8cm hypodense exophytic lesion along right inferior hepatic lobe (unchanged), similar exophytic 4.3 cm lesion along left lateral hepatic segment (unchanged),  7mm clementina-rectal LN  6.6cm left adnexal cyst      2022 Imaging     MRI Pelvis  FINDINGS: There is a circumferential soft tissue mass encircling the posterior mid rectum from the 1:00 to 10:00 position, approximately 8.4 cm above the anal verge. Craniocaudal extent of the mass measures approximately 4.8 cm. The mass invades through the muscularis propria and into the mesorectal fat along its left posterior lateral border as well as likely along its right lateral border. Tumor extends approximately 3 mm beyond the muscularis propria.. The tumor does not reach the mesorectal fascia.     There are at least 3 greater than 5 mm left-sided perirectal lymph nodes and 2 right-sided greater than 5 mm perirectal lymph node seen. These lymph nodes  demonstrate enhancement on post gadolinium images with questionable mild restricted diffusion. No distant adenopathy is seen.     Urinary bladder and uterus appear free of disease.     There is a 5.9 cm cystic lesion in the left adnexa. Left ovary appears otherwise within normal limits. Right ovary is not definitely visualized.        5/24/2022 -  Chemotherapy     FOLFOX  Cycle 4 given 7/6/22               [x]? Concurrent Chemo   Regimen: Bolus 5-FU? - Pt states infusion on weeks 1 and 5         Treatment Site/Current Radiation Dose:      Radiation Treatments      Active   Plans   Pelvis Init   Most recent treatment: Dose planned: 180 cGy (fraction 18 on 10/12/2022)   Total: Dose planned: 4,500 cGy (25 fractions)   Elapsed Days: 23      Reference Points   Pelvis Init   Most recent treatment: Dose given: 180 cGy (on 10/12/2022)   Total: Dose given: 3,240 cGy   Elapsed Days: 23                      Current Radiation Dose:   3240 cGy     Subjective:  Doing better today  Has increased fluid intake and continues to have 4-5 BMs daily which are painful, sometimes liquid, sometimes semi-solid  Will no longer take lomotil due to constipation, straining, and prior rectal bleeding  Dysuria improved with cranberry juice  Skin irritation improved with silvadene     10/5/22  Last week started having urinary urgency and burning  UA taken and suggestive of contaminated specimen with mixed bacterial marco  Completing course of empiric antibiotics with little relief     Having frequent watery stools starting 3 days ago  States she was having watery diarrhea every hour of the night which improved with imodium  Received IVF at medical oncologist yesterday  Taking 3 imodium/day        EXAM  KPS: 90     Vitals       Vitals:     10/12/22 1349   BP: 141/81   Pulse: 99   Resp: 20   SpO2: 97%   Weight: 98.9 kg (218 lb)         Vitals       Pain Score     10/12/22 1349   PainSc:   3   PainLoc: Rectum            Weight:       Wt Readings from  Last 3 Encounters:   10/12/22 98.9 kg (218 lb)   10/05/22 99.6 kg (219 lb 9.6 oz)   09/28/22 99.8 kg (220 lb)         NAD  Skin hyperpigmentation in clementina-anal region, no desquamation        LABS (Reviewed):     Hematology        WBC   Date Value Ref Range Status   10/10/2022 4.20 3.40 - 10.80 10*3/mm3 Final   11/07/2020 10.30 4.5 - 11.0 10*3/uL Final            RBC   Date Value Ref Range Status   10/10/2022 3.85 3.77 - 5.28 10*6/mm3 Final   11/07/2020 4.17 4.0 - 5.2 10*6/uL Final            Hemoglobin   Date Value Ref Range Status   10/10/2022 11.9 (L) 12.0 - 15.9 g/dL Final   11/07/2020 12.4 12.0 - 16.0 g/dL Final            Hematocrit   Date Value Ref Range Status   10/10/2022 35.4 34.0 - 46.6 % Final   11/07/2020 36.8 36.0 - 46.0 % Final            Platelets   Date Value Ref Range Status   10/10/2022 109 (L) 140 - 450 10*3/mm3 Final   11/07/2020 233 140 - 440 10*3/uL Final         Chemistry         Lab Results   Component Value Date     GLUCOSE 206 (H) 10/10/2022     BUN 6 10/10/2022     CREATININE 0.66 10/10/2022     BCR 9.1 10/10/2022     K 4.0 10/10/2022     CO2 22.0 10/10/2022     CALCIUM 9.2 10/10/2022            Medication:      Current Outpatient Medications:   •  acetaminophen-codeine (TYLENOL with CODEINE #3) 300-30 MG per tablet, Take 1 tablet by mouth., Disp: , Rfl:   •  ALPRAZolam (XANAX) 0.5 MG tablet, Take 0.5 mg by mouth 2 (Two) Times a Day As Needed for Anxiety., Disp: , Rfl:   •  ALPRAZolam (XANAX) 0.5 MG tablet, Take 0.5 mg by mouth., Disp: , Rfl:   •  ascorbic acid (VITAMIN C) 250 MG tablet, Take 500 mg by mouth Daily., Disp: , Rfl:   •  ascorbic acid (VITAMIN C) 500 MG tablet, Take 500 mg by mouth Daily., Disp: , Rfl:   •  baclofen (LIORESAL) 10 MG tablet, Take 10 mg by mouth 3 (Three) Times a Day., Disp: , Rfl:   •  buPROPion XL (WELLBUTRIN XL) 300 MG 24 hr tablet, Take 300 mg by mouth Every Morning., Disp: , Rfl:   •  cholecalciferol (Cholecalciferol) 25 MCG (1000 UT) tablet, Take 5,000  Units by mouth Daily., Disp: , Rfl:   •  Continuous Blood Gluc Sensor (FreeStyle Fang 2 Sensor) [facility] [place], USE ONE SENSOR EVERY 14 (FOURTEEN) DAYS AS DIRECTED, Disp: , Rfl:   •  cyclobenzaprine (FLEXERIL) 10 MG tablet, Take 10 mg by mouth., Disp: , Rfl:   •  desvenlafaxine (Pristiq) 50 MG 24 hr tablet, Take 1 tablet by mouth Every Morning., Disp: 90 tablet, Rfl: 1  •  dicyclomine (BENTYL) 10 MG capsule, Take 10 mg by mouth 4 (Four) Times a Day., Disp: , Rfl:   •  diphenoxylate-atropine (LOMOTIL) 2.5-0.025 MG per tablet, Take 1 tablet by mouth 4 (Four) Times a Day As Needed for Diarrhea., Disp: 40 tablet, Rfl: 0  •  fexofenadine (ALLEGRA) 60 MG tablet, Take 60 mg by mouth Daily., Disp: , Rfl:   •  ibuprofen (ADVIL,MOTRIN) 800 MG tablet, Take  by mouth., Disp: , Rfl:   •  Insulin Degludec (Tresiba FlexTouch) 200 UNIT/ML solution pen-injector pen injection, Inject 80 Units under the skin into the appropriate area as directed Daily., Disp: , Rfl:   •  Insulin Lispro (humaLOG) 100 UNIT/ML injection, Inject 30 Units under the skin into the appropriate area as directed 3 (Three) Times a Day Before Meals., Disp: , Rfl:   •  lactulose (CHRONULAC) 10 GM/15ML solution, TAKE 30 MILLILITER BY ORAL ROUTE EVERY DAY FOR CONSTIPATION, Disp: , Rfl:   •  levonorgestrel-ethinyl estradiol (NORDETTE) 0.15-30 MG-MCG per tablet, Take 1 tablet by mouth Daily., Disp: , Rfl:   •  Levothyroxine Sodium (SYNTHROID PO), SYNTHROID TABS, Disp: , Rfl:   •  lisinopril (PRINIVIL,ZESTRIL) 10 MG tablet, Take 10 mg by mouth Daily., Disp: , Rfl:   •  magnesium gluconate (MAGONATE) 500 MG tablet, Take 27 mg by mouth Daily., Disp: , Rfl:   •  Melatonin 10 MG tablet, Take  by mouth., Disp: , Rfl:   •  multivitamin with minerals tablet tablet, Take 1 tablet by mouth Daily., Disp: , Rfl:   •  Naltrexone HCl (DEPADE PO), Take 4 mg by mouth Every Night., Disp: , Rfl:   •  nitrofurantoin, macrocrystal-monohydrate, (Macrobid) 100 MG capsule, Take 1 capsule by mouth 2  (Two) Times a Day., Disp: 14 capsule, Rfl: 0  •  norethindrone (MICRONOR) 0.35 MG tablet, Take 1 tablet by mouth Daily., Disp: , Rfl:   •  ondansetron (ZOFRAN) 4 MG tablet, TAKE 1 TABLET BY ORAL ROUTE EVERY 4- 6 HOURS AS NEEDED FOR NAUSEA, Disp: , Rfl:   •  pantoprazole (PROTONIX) 40 MG EC tablet, Take 40 mg by mouth Daily., Disp: , Rfl:   •  phentermine (ADIPEX-P) 37.5 MG tablet, Take 37.5 mg by mouth Every Morning Before Breakfast., Disp: , Rfl:   •  promethazine (PHENERGAN) 25 MG tablet, TAKE 1 TABLET BY MOUTH EVERY 4 TO 6 HOURS AS NEEDED, Disp: , Rfl:   •  rizatriptan (MAXALT) 10 MG tablet, Take 10 mg by mouth 1 (One) Time As Needed for Migraine. May repeat in 2 hours if needed, Disp: , Rfl:   •  Semaglutide, 1 MG/DOSE, (Ozempic, 1 MG/DOSE,) 2 MG/1.5ML solution pen-injector, Inject 1 mg under the skin into the appropriate area as directed 1 (One) Time Per Week., Disp: , Rfl:   •  sorbitol 70 % solution, Take 15 mL by mouth Daily As Needed., Disp: , Rfl:   •  Thyroid 90 MG PO tablet, Take 180 mg by mouth Daily., Disp: , Rfl:   •  topiramate (TOPAMAX) 25 MG capsule (sprinkle), Take 25 mg by mouth 2 (Two) Times a Day., Disp: , Rfl:   •  vitamin B-12 (CYANOCOBALAMIN) 1000 MCG tablet, Take 1,000 mcg by mouth Daily., Disp: , Rfl:   •  vitamin D3 125 MCG (5000 UT) capsule capsule, Take 5,000 Units by mouth Daily., Disp: , Rfl:         PAIN:   Yun Mcgowan reports a pain score of 3.  Given her pain assessment as noted, treatment options were discussed and the following options were decided upon as a follow-up plan to address the patient's pain: continuation of current treatment plan for pain.           Patient chart including appropriate labs, setup images, treatment parameters, delivered dose have been reviewed        Recommendations:   [x]? Continue RT                                                                                                                          []? Change RT Plan []? Hold RT, length:           Cont to start azo/cranberry tabs  Cont aquaphor/silvadene for skin irritation  Add Proctofoam or Anusol-HC for proctitis

## 2022-10-13 ENCOUNTER — HOSPITAL ENCOUNTER (OUTPATIENT)
Dept: RADIATION ONCOLOGY | Facility: HOSPITAL | Age: 47
Discharge: HOME | End: 2022-10-13

## 2022-10-13 LAB
RAD ONC ARIA COURSE ID: NORMAL
RAD ONC ARIA COURSE LAST TREATMENT DATE: NORMAL
RAD ONC ARIA COURSE START DATE: NORMAL
RAD ONC ARIA COURSE TREATMENT ELAPSED DAYS: 24
RAD ONC ARIA FIRST TREATMENT DATE: NORMAL
RAD ONC ARIA PLAN FRACTIONS TREATED TO DATE: 19
RAD ONC ARIA PLAN ID: NORMAL
RAD ONC ARIA PLAN PRESCRIBED DOSE PER FRACTION: 1.8 GY
RAD ONC ARIA PLAN PRIMARY REFERENCE POINT: NORMAL
RAD ONC ARIA PLAN TOTAL FRACTIONS PRESCRIBED: 25
RAD ONC ARIA PLAN TOTAL PRESCRIBED DOSE: 4500 CGY
RAD ONC ARIA REFERENCE POINT DOSAGE GIVEN TO DATE: 34.2 GY
RAD ONC ARIA REFERENCE POINT ID: NORMAL
RAD ONC ARIA REFERENCE POINT SESSION DOSAGE GIVEN: 1.8 GY

## 2022-10-13 PROCEDURE — 77386: CPT | Performed by: RADIOLOGY

## 2022-10-13 PROCEDURE — 77336 RADIATION PHYSICS CONSULT: CPT | Performed by: RADIOLOGY

## 2022-10-13 PROCEDURE — 77014 CHG CT GUIDANCE RADIATION THERAPY FLDS PLACEMENT: CPT | Performed by: RADIOLOGY

## 2022-10-14 ENCOUNTER — HOSPITAL ENCOUNTER (OUTPATIENT)
Dept: RADIATION ONCOLOGY | Facility: HOSPITAL | Age: 47
Discharge: HOME | End: 2022-10-14

## 2022-10-14 LAB
RAD ONC ARIA COURSE ID: NORMAL
RAD ONC ARIA COURSE LAST TREATMENT DATE: NORMAL
RAD ONC ARIA COURSE START DATE: NORMAL
RAD ONC ARIA COURSE TREATMENT ELAPSED DAYS: 25
RAD ONC ARIA FIRST TREATMENT DATE: NORMAL
RAD ONC ARIA PLAN FRACTIONS TREATED TO DATE: 20
RAD ONC ARIA PLAN ID: NORMAL
RAD ONC ARIA PLAN PRESCRIBED DOSE PER FRACTION: 1.8 GY
RAD ONC ARIA PLAN PRIMARY REFERENCE POINT: NORMAL
RAD ONC ARIA PLAN TOTAL FRACTIONS PRESCRIBED: 25
RAD ONC ARIA PLAN TOTAL PRESCRIBED DOSE: 4500 CGY
RAD ONC ARIA REFERENCE POINT DOSAGE GIVEN TO DATE: 36 GY
RAD ONC ARIA REFERENCE POINT ID: NORMAL
RAD ONC ARIA REFERENCE POINT SESSION DOSAGE GIVEN: 1.8 GY

## 2022-10-14 PROCEDURE — 77386: CPT | Performed by: RADIOLOGY

## 2022-10-14 PROCEDURE — 77014 CHG CT GUIDANCE RADIATION THERAPY FLDS PLACEMENT: CPT | Performed by: RADIOLOGY

## 2022-10-17 ENCOUNTER — HOSPITAL ENCOUNTER (OUTPATIENT)
Dept: RADIATION ONCOLOGY | Facility: HOSPITAL | Age: 47
Discharge: HOME | End: 2022-10-17

## 2022-10-17 LAB
RAD ONC ARIA COURSE ID: NORMAL
RAD ONC ARIA COURSE LAST TREATMENT DATE: NORMAL
RAD ONC ARIA COURSE START DATE: NORMAL
RAD ONC ARIA COURSE TREATMENT ELAPSED DAYS: 28
RAD ONC ARIA FIRST TREATMENT DATE: NORMAL
RAD ONC ARIA PLAN FRACTIONS TREATED TO DATE: 21
RAD ONC ARIA PLAN ID: NORMAL
RAD ONC ARIA PLAN PRESCRIBED DOSE PER FRACTION: 1.8 GY
RAD ONC ARIA PLAN PRIMARY REFERENCE POINT: NORMAL
RAD ONC ARIA PLAN TOTAL FRACTIONS PRESCRIBED: 25
RAD ONC ARIA PLAN TOTAL PRESCRIBED DOSE: 4500 CGY
RAD ONC ARIA REFERENCE POINT DOSAGE GIVEN TO DATE: 37.8 GY
RAD ONC ARIA REFERENCE POINT ID: NORMAL
RAD ONC ARIA REFERENCE POINT SESSION DOSAGE GIVEN: 1.8 GY

## 2022-10-17 PROCEDURE — 77014 CHG CT GUIDANCE RADIATION THERAPY FLDS PLACEMENT: CPT | Performed by: RADIOLOGY

## 2022-10-17 PROCEDURE — 77386: CPT | Performed by: RADIOLOGY

## 2022-10-17 PROCEDURE — 77427 RADIATION TX MANAGEMENT X5: CPT | Performed by: RADIOLOGY

## 2022-10-18 ENCOUNTER — HOSPITAL ENCOUNTER (OUTPATIENT)
Dept: RADIATION ONCOLOGY | Facility: HOSPITAL | Age: 47
Discharge: HOME | End: 2022-10-18

## 2022-10-18 LAB
RAD ONC ARIA COURSE ID: NORMAL
RAD ONC ARIA COURSE LAST TREATMENT DATE: NORMAL
RAD ONC ARIA COURSE START DATE: NORMAL
RAD ONC ARIA COURSE TREATMENT ELAPSED DAYS: 29
RAD ONC ARIA FIRST TREATMENT DATE: NORMAL
RAD ONC ARIA PLAN FRACTIONS TREATED TO DATE: 22
RAD ONC ARIA PLAN ID: NORMAL
RAD ONC ARIA PLAN PRESCRIBED DOSE PER FRACTION: 1.8 GY
RAD ONC ARIA PLAN PRIMARY REFERENCE POINT: NORMAL
RAD ONC ARIA PLAN TOTAL FRACTIONS PRESCRIBED: 25
RAD ONC ARIA PLAN TOTAL PRESCRIBED DOSE: 4500 CGY
RAD ONC ARIA REFERENCE POINT DOSAGE GIVEN TO DATE: 39.6 GY
RAD ONC ARIA REFERENCE POINT ID: NORMAL
RAD ONC ARIA REFERENCE POINT SESSION DOSAGE GIVEN: 1.8 GY

## 2022-10-18 PROCEDURE — 77386: CPT | Performed by: RADIOLOGY

## 2022-10-18 PROCEDURE — 77014 CHG CT GUIDANCE RADIATION THERAPY FLDS PLACEMENT: CPT | Performed by: RADIOLOGY

## 2022-10-19 ENCOUNTER — RADIATION ONCOLOGY WEEKLY ASSESSMENT (OUTPATIENT)
Dept: RADIATION ONCOLOGY | Facility: HOSPITAL | Age: 47
End: 2022-10-19

## 2022-10-19 ENCOUNTER — HOSPITAL ENCOUNTER (OUTPATIENT)
Dept: RADIATION ONCOLOGY | Facility: HOSPITAL | Age: 47
Discharge: HOME | End: 2022-10-19

## 2022-10-19 VITALS
BODY MASS INDEX: 33.15 KG/M2 | TEMPERATURE: 98.4 F | WEIGHT: 223.8 LBS | HEART RATE: 101 BPM | HEIGHT: 69 IN | SYSTOLIC BLOOD PRESSURE: 160 MMHG | OXYGEN SATURATION: 99 % | DIASTOLIC BLOOD PRESSURE: 81 MMHG | RESPIRATION RATE: 18 BRPM

## 2022-10-19 DIAGNOSIS — C20 RECTAL CANCER: Primary | ICD-10-CM

## 2022-10-19 LAB
RAD ONC ARIA COURSE ID: NORMAL
RAD ONC ARIA COURSE LAST TREATMENT DATE: NORMAL
RAD ONC ARIA COURSE START DATE: NORMAL
RAD ONC ARIA COURSE TREATMENT ELAPSED DAYS: 30
RAD ONC ARIA FIRST TREATMENT DATE: NORMAL
RAD ONC ARIA PLAN FRACTIONS TREATED TO DATE: 23
RAD ONC ARIA PLAN ID: NORMAL
RAD ONC ARIA PLAN PRESCRIBED DOSE PER FRACTION: 1.8 GY
RAD ONC ARIA PLAN PRIMARY REFERENCE POINT: NORMAL
RAD ONC ARIA PLAN TOTAL FRACTIONS PRESCRIBED: 25
RAD ONC ARIA PLAN TOTAL PRESCRIBED DOSE: 4500 CGY
RAD ONC ARIA REFERENCE POINT DOSAGE GIVEN TO DATE: 41.4 GY
RAD ONC ARIA REFERENCE POINT ID: NORMAL
RAD ONC ARIA REFERENCE POINT SESSION DOSAGE GIVEN: 1.8 GY

## 2022-10-19 PROCEDURE — FACE2FACE: Performed by: RADIOLOGY

## 2022-10-19 PROCEDURE — 77386: CPT | Performed by: RADIOLOGY

## 2022-10-19 NOTE — PROGRESS NOTES
Patient Name: Yun Mcgowan  : 1975  MRN #: [id number]          RADIATION ON TREATMENT VISIT NOTE    DIAGNOSIS:    Diagnosis Plan   1. Rectal cancer (HCC)             Cancer Staging  Rectal cancer (HCC)  Staging form:   Colon And Rectum, AJCC 8th Edition  - Clinical: Stage IIIB (cT3, cN2a, cM0) - Unsigned       TREATMENT COURSE:   Oncology/Hematology History   Rectal cancer (HCC)   2022 Initial Diagnosis    Rectal cancer (HCC)     2022 Procedure    Colonoscopy    MMR intact     2022 Imaging    CT Chest/Abd/Pel  No pulmonary mets  Cirrhotic liver, 4.2 x 4.8cm hypodense exophytic lesion along right inferior hepatic lobe (unchanged), similar exophytic 4.3 cm lesion along left lateral hepatic segment (unchanged),  7mm clementina-rectal LN  6.6cm left adnexal cyst     2022 Imaging    MRI Pelvis  FINDINGS: There is a circumferential soft tissue mass encircling the posterior mid rectum from the 1:00 to 10:00 position, approximately 8.4 cm above the anal verge. Craniocaudal extent of the mass measures approximately 4.8 cm. The mass invades through the muscularis propria and into the mesorectal fat along its left posterior lateral border as well as likely along its right lateral border. Tumor extends approximately 3 mm beyond the muscularis propria.. The tumor does not reach the mesorectal fascia.     There are at least 3 greater than 5 mm left-sided perirectal lymph nodes and 2 right-sided greater than 5 mm perirectal lymph node seen. These lymph nodes demonstrate enhancement on post gadolinium images with questionable mild restricted diffusion. No distant adenopathy is seen.     Urinary bladder and uterus appear free of disease.     There is a 5.9 cm cystic lesion in the left adnexa. Left ovary appears otherwise within normal limits. Right ovary is not definitely visualized.       2022 -  Chemotherapy    FOLFOX  Cycle 4 given 22           [x] Concurrent Chemo   Regimen: Bolus 5-FU? - Pt states  "infusion on weeks 1 and 5       Treatment Site/Current Radiation Dose:  Radiation Treatments     Active   Plans   Pelvis Init   Most recent treatment: Dose planned: 180 cGy (fraction 22 on 10/18/2022)   Total: Dose planned: 4,500 cGy (25 fractions)   Elapsed Days: 29      Reference Points   Pelvis Init   Most recent treatment: Dose given: 180 cGy (on 10/18/2022)   Total: Dose given: 3,960 cGy   Elapsed Days: 29                   Current Radiation Dose:   4140 cGy    Subjective:  States she is doing well today  She has stable alternating episodes of diarrhea and constipation for which she is taking as needed over-the-counter medications  She has continued irritation of external hemorrhoids with some episodes of external rectal bleeding with bowel movements    10/12/22  Doing better today  Has increased fluid intake and continues to have 4-5 BMs daily which are painful, sometimes liquid, sometimes semi-solid  Will no longer take lomotil due to constipation, straining, and prior rectal bleeding  Dysuria improved with cranberry juice  Skin irritation improved with silvadene    10/5/22  Last week started having urinary urgency and burning  UA taken and suggestive of contaminated specimen with mixed bacterial marco  Completing course of empiric antibiotics with little relief    Having frequent watery stools starting 3 days ago  States she was having watery diarrhea every hour of the night which improved with imodium  Received IVF at medical oncologist yesterday  Taking 3 imodium/day      EXAM  KPS: 90    Vitals:    10/19/22 1555   BP: 160/81   Pulse: 101   Resp: 18   Temp: 98.4 °F (36.9 °C)   TempSrc: Oral   SpO2: 99%   Weight: 102 kg (223 lb 12.8 oz)   Height: 175.3 cm (69\")     Pain Score    10/19/22 1555   PainSc:   4   PainLoc: Rectum       Weight:   Wt Readings from Last 3 Encounters:   10/19/22 102 kg (223 lb 12.8 oz)   10/12/22 98.9 kg (218 lb)   10/05/22 99.6 kg (219 lb 9.6 oz)       NAD  Skin hyperpigmentation in " clementina-anal region, no desquamation      LABS (Reviewed):    Hematology  WBC   Date Value Ref Range Status   10/10/2022 4.20 3.40 - 10.80 10*3/mm3 Final   11/07/2020 10.30 4.5 - 11.0 10*3/uL Final     RBC   Date Value Ref Range Status   10/10/2022 3.85 3.77 - 5.28 10*6/mm3 Final   11/07/2020 4.17 4.0 - 5.2 10*6/uL Final     Hemoglobin   Date Value Ref Range Status   10/10/2022 11.9 (L) 12.0 - 15.9 g/dL Final   11/07/2020 12.4 12.0 - 16.0 g/dL Final     Hematocrit   Date Value Ref Range Status   10/10/2022 35.4 34.0 - 46.6 % Final   11/07/2020 36.8 36.0 - 46.0 % Final     Platelets   Date Value Ref Range Status   10/10/2022 109 (L) 140 - 450 10*3/mm3 Final   11/07/2020 233 140 - 440 10*3/uL Final       Chemistry   Lab Results   Component Value Date    GLUCOSE 206 (H) 10/10/2022    BUN 6 10/10/2022    CREATININE 0.66 10/10/2022    BCR 9.1 10/10/2022    K 4.0 10/10/2022    CO2 22.0 10/10/2022    CALCIUM 9.2 10/10/2022         Medication:     Current Outpatient Medications:   •  acetaminophen-codeine (TYLENOL with CODEINE #3) 300-30 MG per tablet, Take 1 tablet by mouth., Disp: , Rfl:   •  ALPRAZolam (XANAX) 0.5 MG tablet, Take 0.5 mg by mouth 2 (Two) Times a Day As Needed for Anxiety., Disp: , Rfl:   •  ALPRAZolam (XANAX) 0.5 MG tablet, Take 0.5 mg by mouth., Disp: , Rfl:   •  ascorbic acid (VITAMIN C) 250 MG tablet, Take 500 mg by mouth Daily., Disp: , Rfl:   •  ascorbic acid (VITAMIN C) 500 MG tablet, Take 500 mg by mouth Daily., Disp: , Rfl:   •  baclofen (LIORESAL) 10 MG tablet, Take 10 mg by mouth 3 (Three) Times a Day., Disp: , Rfl:   •  buPROPion XL (WELLBUTRIN XL) 300 MG 24 hr tablet, Take 300 mg by mouth Every Morning., Disp: , Rfl:   •  cholecalciferol (Cholecalciferol) 25 MCG (1000 UT) tablet, Take 5,000 Units by mouth Daily., Disp: , Rfl:   •  Continuous Blood Gluc Sensor (FreeStyle Fang 2 Sensor) [facility], USE ONE SENSOR EVERY 14 (FOURTEEN) DAYS AS DIRECTED, Disp: , Rfl:   •  cyclobenzaprine (FLEXERIL) 10 MG  tablet, Take 10 mg by mouth., Disp: , Rfl:   •  desvenlafaxine (Pristiq) 50 MG 24 hr tablet, Take 1 tablet by mouth Every Morning., Disp: 90 tablet, Rfl: 1  •  dicyclomine (BENTYL) 10 MG capsule, Take 10 mg by mouth 4 (Four) Times a Day., Disp: , Rfl:   •  diphenoxylate-atropine (LOMOTIL) 2.5-0.025 MG per tablet, Take 1 tablet by mouth 4 (Four) Times a Day As Needed for Diarrhea., Disp: 40 tablet, Rfl: 0  •  fexofenadine (ALLEGRA) 60 MG tablet, Take 60 mg by mouth Daily., Disp: , Rfl:   •  Hydrocort-Pramoxine, Perianal, (Proctofoam HC) 1-1 % rectal foam, Insert 1 application into the rectum 2 (Two) Times a Day., Disp: 10 g, Rfl: 2  •  hydrocortisone (ANUSOL-HC) 25 MG suppository, Insert 1 suppository into the rectum 2 (Two) Times a Day., Disp: 24 suppository, Rfl: 1  •  ibuprofen (ADVIL,MOTRIN) 800 MG tablet, Take  by mouth., Disp: , Rfl:   •  Insulin Degludec (Tresiba FlexTouch) 200 UNIT/ML solution pen-injector pen injection, Inject 80 Units under the skin into the appropriate area as directed Daily., Disp: , Rfl:   •  Insulin Lispro (humaLOG) 100 UNIT/ML injection, Inject 30 Units under the skin into the appropriate area as directed 3 (Three) Times a Day Before Meals., Disp: , Rfl:   •  lactulose (CHRONULAC) 10 GM/15ML solution, TAKE 30 MILLILITER BY ORAL ROUTE EVERY DAY FOR CONSTIPATION, Disp: , Rfl:   •  levonorgestrel-ethinyl estradiol (NORDETTE) 0.15-30 MG-MCG per tablet, Take 1 tablet by mouth Daily., Disp: , Rfl:   •  Levothyroxine Sodium (SYNTHROID PO), SYNTHROID TABS, Disp: , Rfl:   •  lisinopril (PRINIVIL,ZESTRIL) 10 MG tablet, Take 10 mg by mouth Daily., Disp: , Rfl:   •  magnesium gluconate (MAGONATE) 500 MG tablet, Take 27 mg by mouth Daily., Disp: , Rfl:   •  Melatonin 10 MG tablet, Take  by mouth., Disp: , Rfl:   •  multivitamin with minerals tablet tablet, Take 1 tablet by mouth Daily., Disp: , Rfl:   •  Naltrexone HCl (DEPADE PO), Take 4 mg by mouth Every Night., Disp: , Rfl:   •  nitrofurantoin,  macrocrystal-monohydrate, (Macrobid) 100 MG capsule, Take 1 capsule by mouth 2 (Two) Times a Day., Disp: 14 capsule, Rfl: 0  •  norethindrone (MICRONOR) 0.35 MG tablet, Take 1 tablet by mouth Daily., Disp: , Rfl:   •  ondansetron (ZOFRAN) 4 MG tablet, TAKE 1 TABLET BY ORAL ROUTE EVERY 4- 6 HOURS AS NEEDED FOR NAUSEA, Disp: , Rfl:   •  pantoprazole (PROTONIX) 40 MG EC tablet, Take 40 mg by mouth Daily., Disp: , Rfl:   •  phentermine (ADIPEX-P) 37.5 MG tablet, Take 37.5 mg by mouth Every Morning Before Breakfast., Disp: , Rfl:   •  promethazine (PHENERGAN) 25 MG tablet, TAKE 1 TABLET BY MOUTH EVERY 4 TO 6 HOURS AS NEEDED, Disp: , Rfl:   •  rizatriptan (MAXALT) 10 MG tablet, Take 10 mg by mouth 1 (One) Time As Needed for Migraine. May repeat in 2 hours if needed, Disp: , Rfl:   •  Semaglutide, 1 MG/DOSE, (Ozempic, 1 MG/DOSE,) 2 MG/1.5ML solution pen-injector, Inject 1 mg under the skin into the appropriate area as directed 1 (One) Time Per Week., Disp: , Rfl:   •  sorbitol 70 % solution, Take 15 mL by mouth Daily As Needed., Disp: , Rfl:   •  Thyroid 90 MG PO tablet, Take 180 mg by mouth Daily., Disp: , Rfl:   •  topiramate (TOPAMAX) 25 MG capsule (sprinkle), Take 25 mg by mouth 2 (Two) Times a Day., Disp: , Rfl:   •  vitamin B-12 (CYANOCOBALAMIN) 1000 MCG tablet, Take 1,000 mcg by mouth Daily., Disp: , Rfl:   •  vitamin D3 125 MCG (5000 UT) capsule capsule, Take 5,000 Units by mouth Daily., Disp: , Rfl:       PAIN:   Yun Mcgowan reports a pain score of 4.  Given her pain assessment as noted, treatment options were discussed and the following options were decided upon as a follow-up plan to address the patient's pain: continuation of current treatment plan for pain.        Patient chart including appropriate labs, setup images, treatment parameters, delivered dose have been reviewed      Recommendations:   [x] Continue RT  [] Change RT Plan [] Hold RT, length:        Cont azo/cranberry tabs  Cont aquaphor/silvadene  for skin irritation  Pt unsure if she is taking Proctofoam or Anusol-HC as prescribed      Approved Electronically By:  Chema Araiza MD, 10/19/2022, 16:06 EDT

## 2022-10-19 NOTE — PROGRESS NOTES
Patient Name: Yun Mcgowan  : 1975  MRN #: [id number]          RADIATION ON TREATMENT VISIT NOTE    DIAGNOSIS:   No diagnosis found.     Cancer Staging  Rectal cancer (HCC)  Staging form:   Colon And Rectum, AJCC 8th Edition  - Clinical: Stage IIIB (cT3, cN2a, cM0) - Unsigned       TREATMENT COURSE:   Oncology/Hematology History   Rectal cancer (HCC)   2022 Initial Diagnosis    Rectal cancer (HCC)     2022 Procedure    Colonoscopy    MMR intact     2022 Imaging    CT Chest/Abd/Pel  No pulmonary mets  Cirrhotic liver, 4.2 x 4.8cm hypodense exophytic lesion along right inferior hepatic lobe (unchanged), similar exophytic 4.3 cm lesion along left lateral hepatic segment (unchanged),  7mm clementina-rectal LN  6.6cm left adnexal cyst     2022 Imaging    MRI Pelvis  FINDINGS: There is a circumferential soft tissue mass encircling the posterior mid rectum from the 1:00 to 10:00 position, approximately 8.4 cm above the anal verge. Craniocaudal extent of the mass measures approximately 4.8 cm. The mass invades through the muscularis propria and into the mesorectal fat along its left posterior lateral border as well as likely along its right lateral border. Tumor extends approximately 3 mm beyond the muscularis propria.. The tumor does not reach the mesorectal fascia.     There are at least 3 greater than 5 mm left-sided perirectal lymph nodes and 2 right-sided greater than 5 mm perirectal lymph node seen. These lymph nodes demonstrate enhancement on post gadolinium images with questionable mild restricted diffusion. No distant adenopathy is seen.     Urinary bladder and uterus appear free of disease.     There is a 5.9 cm cystic lesion in the left adnexa. Left ovary appears otherwise within normal limits. Right ovary is not definitely visualized.       2022 -  Chemotherapy    FOLFOX  Cycle 4 given 22           [x] Concurrent Chemo   Regimen: Bolus 5-FU? - Pt states infusion on weeks 1 and 5  "      Treatment Site/Current Radiation Dose:  Radiation Treatments     Active   Plans   Pelvis Init   Most recent treatment: Dose planned: 180 cGy (fraction 22 on 10/18/2022)   Total: Dose planned: 4,500 cGy (25 fractions)   Elapsed Days: 29      Reference Points   Pelvis Init   Most recent treatment: Dose given: 180 cGy (on 10/18/2022)   Total: Dose given: 3,960 cGy   Elapsed Days: 29                   Current Radiation Dose:   3960 cGy    Subjective:  Doing better today  Has increased fluid intake and continues to have 4-5 BMs daily which are painful, sometimes liquid, sometimes semi-solid  Will no longer take lomotil due to constipation, straining, and prior rectal bleeding  Dysuria improved with cranberry juice  Skin irritation improved with silvadene    10/5/22  Last week started having urinary urgency and burning  UA taken and suggestive of contaminated specimen with mixed bacterial marco  Completing course of empiric antibiotics with little relief    Having frequent watery stools starting 3 days ago  States she was having watery diarrhea every hour of the night which improved with imodium  Received IVF at medical oncologist yesterday  Taking 3 imodium/day      EXAM  KPS: 90    Vitals:    10/19/22 1555   BP: 160/81   Pulse: 101   Resp: 18   Temp: 98.4 °F (36.9 °C)   TempSrc: Oral   SpO2: 99%   Weight: 102 kg (223 lb 12.8 oz)   Height: 175.3 cm (69\")     Pain Score    10/19/22 1555   PainSc:   4   PainLoc: Rectum       Weight:   Wt Readings from Last 3 Encounters:   10/19/22 102 kg (223 lb 12.8 oz)   10/12/22 98.9 kg (218 lb)   10/05/22 99.6 kg (219 lb 9.6 oz)       NAD  Skin hyperpigmentation in clementina-anal region, no desquamation      LABS (Reviewed):    Hematology  WBC   Date Value Ref Range Status   10/10/2022 4.20 3.40 - 10.80 10*3/mm3 Final   11/07/2020 10.30 4.5 - 11.0 10*3/uL Final     RBC   Date Value Ref Range Status   10/10/2022 3.85 3.77 - 5.28 10*6/mm3 Final   11/07/2020 4.17 4.0 - 5.2 10*6/uL Final "     Hemoglobin   Date Value Ref Range Status   10/10/2022 11.9 (L) 12.0 - 15.9 g/dL Final   11/07/2020 12.4 12.0 - 16.0 g/dL Final     Hematocrit   Date Value Ref Range Status   10/10/2022 35.4 34.0 - 46.6 % Final   11/07/2020 36.8 36.0 - 46.0 % Final     Platelets   Date Value Ref Range Status   10/10/2022 109 (L) 140 - 450 10*3/mm3 Final   11/07/2020 233 140 - 440 10*3/uL Final       Chemistry   Lab Results   Component Value Date    GLUCOSE 206 (H) 10/10/2022    BUN 6 10/10/2022    CREATININE 0.66 10/10/2022    BCR 9.1 10/10/2022    K 4.0 10/10/2022    CO2 22.0 10/10/2022    CALCIUM 9.2 10/10/2022         Medication:     Current Outpatient Medications:   •  acetaminophen-codeine (TYLENOL with CODEINE #3) 300-30 MG per tablet, Take 1 tablet by mouth., Disp: , Rfl:   •  ALPRAZolam (XANAX) 0.5 MG tablet, Take 0.5 mg by mouth 2 (Two) Times a Day As Needed for Anxiety., Disp: , Rfl:   •  ALPRAZolam (XANAX) 0.5 MG tablet, Take 0.5 mg by mouth., Disp: , Rfl:   •  ascorbic acid (VITAMIN C) 250 MG tablet, Take 500 mg by mouth Daily., Disp: , Rfl:   •  ascorbic acid (VITAMIN C) 500 MG tablet, Take 500 mg by mouth Daily., Disp: , Rfl:   •  baclofen (LIORESAL) 10 MG tablet, Take 10 mg by mouth 3 (Three) Times a Day., Disp: , Rfl:   •  buPROPion XL (WELLBUTRIN XL) 300 MG 24 hr tablet, Take 300 mg by mouth Every Morning., Disp: , Rfl:   •  cholecalciferol (Cholecalciferol) 25 MCG (1000 UT) tablet, Take 5,000 Units by mouth Daily., Disp: , Rfl:   •  Continuous Blood Gluc Sensor (FreeStyle Fang 2 Sensor) [facility] [place] USE ONE SENSOR EVERY 14 (FOURTEEN) DAYS AS DIRECTED, Disp: , Rfl:   •  cyclobenzaprine (FLEXERIL) 10 MG tablet, Take 10 mg by mouth., Disp: , Rfl:   •  desvenlafaxine (Pristiq) 50 MG 24 hr tablet, Take 1 tablet by mouth Every Morning., Disp: 90 tablet, Rfl: 1  •  dicyclomine (BENTYL) 10 MG capsule, Take 10 mg by mouth 4 (Four) Times a Day., Disp: , Rfl:   •  diphenoxylate-atropine (LOMOTIL) 2.5-0.025 MG per tablet, Take 1  tablet by mouth 4 (Four) Times a Day As Needed for Diarrhea., Disp: 40 tablet, Rfl: 0  •  fexofenadine (ALLEGRA) 60 MG tablet, Take 60 mg by mouth Daily., Disp: , Rfl:   •  Hydrocort-Pramoxine, Perianal, (Proctofoam HC) 1-1 % rectal foam, Insert 1 application into the rectum 2 (Two) Times a Day., Disp: 10 g, Rfl: 2  •  hydrocortisone (ANUSOL-HC) 25 MG suppository, Insert 1 suppository into the rectum 2 (Two) Times a Day., Disp: 24 suppository, Rfl: 1  •  ibuprofen (ADVIL,MOTRIN) 800 MG tablet, Take  by mouth., Disp: , Rfl:   •  Insulin Degludec (Tresiba FlexTouch) 200 UNIT/ML solution pen-injector pen injection, Inject 80 Units under the skin into the appropriate area as directed Daily., Disp: , Rfl:   •  Insulin Lispro (humaLOG) 100 UNIT/ML injection, Inject 30 Units under the skin into the appropriate area as directed 3 (Three) Times a Day Before Meals., Disp: , Rfl:   •  lactulose (CHRONULAC) 10 GM/15ML solution, TAKE 30 MILLILITER BY ORAL ROUTE EVERY DAY FOR CONSTIPATION, Disp: , Rfl:   •  levonorgestrel-ethinyl estradiol (NORDETTE) 0.15-30 MG-MCG per tablet, Take 1 tablet by mouth Daily., Disp: , Rfl:   •  Levothyroxine Sodium (SYNTHROID PO), SYNTHROID TABS, Disp: , Rfl:   •  lisinopril (PRINIVIL,ZESTRIL) 10 MG tablet, Take 10 mg by mouth Daily., Disp: , Rfl:   •  magnesium gluconate (MAGONATE) 500 MG tablet, Take 27 mg by mouth Daily., Disp: , Rfl:   •  Melatonin 10 MG tablet, Take  by mouth., Disp: , Rfl:   •  multivitamin with minerals tablet tablet, Take 1 tablet by mouth Daily., Disp: , Rfl:   •  Naltrexone HCl (DEPADE PO), Take 4 mg by mouth Every Night., Disp: , Rfl:   •  nitrofurantoin, macrocrystal-monohydrate, (Macrobid) 100 MG capsule, Take 1 capsule by mouth 2 (Two) Times a Day., Disp: 14 capsule, Rfl: 0  •  norethindrone (MICRONOR) 0.35 MG tablet, Take 1 tablet by mouth Daily., Disp: , Rfl:   •  ondansetron (ZOFRAN) 4 MG tablet, TAKE 1 TABLET BY ORAL ROUTE EVERY 4- 6 HOURS AS NEEDED FOR NAUSEA,  Disp: , Rfl:   •  pantoprazole (PROTONIX) 40 MG EC tablet, Take 40 mg by mouth Daily., Disp: , Rfl:   •  phentermine (ADIPEX-P) 37.5 MG tablet, Take 37.5 mg by mouth Every Morning Before Breakfast., Disp: , Rfl:   •  promethazine (PHENERGAN) 25 MG tablet, TAKE 1 TABLET BY MOUTH EVERY 4 TO 6 HOURS AS NEEDED, Disp: , Rfl:   •  rizatriptan (MAXALT) 10 MG tablet, Take 10 mg by mouth 1 (One) Time As Needed for Migraine. May repeat in 2 hours if needed, Disp: , Rfl:   •  Semaglutide, 1 MG/DOSE, (Ozempic, 1 MG/DOSE,) 2 MG/1.5ML solution pen-injector, Inject 1 mg under the skin into the appropriate area as directed 1 (One) Time Per Week., Disp: , Rfl:   •  sorbitol 70 % solution, Take 15 mL by mouth Daily As Needed., Disp: , Rfl:   •  Thyroid 90 MG PO tablet, Take 180 mg by mouth Daily., Disp: , Rfl:   •  topiramate (TOPAMAX) 25 MG capsule (sprinkle), Take 25 mg by mouth 2 (Two) Times a Day., Disp: , Rfl:   •  vitamin B-12 (CYANOCOBALAMIN) 1000 MCG tablet, Take 1,000 mcg by mouth Daily., Disp: , Rfl:   •  vitamin D3 125 MCG (5000 UT) capsule capsule, Take 5,000 Units by mouth Daily., Disp: , Rfl:       PAIN:   Yun Mcgowan reports a pain score of 4.  Given her pain assessment as noted, treatment options were discussed and the following options were decided upon as a follow-up plan to address the patient's pain: continuation of current treatment plan for pain.        Patient chart including appropriate labs, setup images, treatment parameters, delivered dose have been reviewed      Recommendations:   [x] Continue RT  [] Change RT Plan [] Hold RT, length:        Cont to start azo/cranberry tabs  Cont aquaphor/silvadene for skin irritation  Add Proctofoam or Anusol-HC for proctitis      Approved Electronically By:  Chema Araiza MD, 10/19/2022, 15:59 EDT

## 2022-10-20 ENCOUNTER — HOSPITAL ENCOUNTER (OUTPATIENT)
Dept: RADIATION ONCOLOGY | Facility: HOSPITAL | Age: 47
Discharge: HOME | End: 2022-10-20

## 2022-10-20 LAB
RAD ONC ARIA COURSE ID: NORMAL
RAD ONC ARIA COURSE LAST TREATMENT DATE: NORMAL
RAD ONC ARIA COURSE START DATE: NORMAL
RAD ONC ARIA COURSE TREATMENT ELAPSED DAYS: 31
RAD ONC ARIA FIRST TREATMENT DATE: NORMAL
RAD ONC ARIA PLAN FRACTIONS TREATED TO DATE: 24
RAD ONC ARIA PLAN ID: NORMAL
RAD ONC ARIA PLAN PRESCRIBED DOSE PER FRACTION: 1.8 GY
RAD ONC ARIA PLAN PRIMARY REFERENCE POINT: NORMAL
RAD ONC ARIA PLAN TOTAL FRACTIONS PRESCRIBED: 25
RAD ONC ARIA PLAN TOTAL PRESCRIBED DOSE: 4500 CGY
RAD ONC ARIA REFERENCE POINT DOSAGE GIVEN TO DATE: 43.2 GY
RAD ONC ARIA REFERENCE POINT ID: NORMAL
RAD ONC ARIA REFERENCE POINT SESSION DOSAGE GIVEN: 1.8 GY

## 2022-10-20 PROCEDURE — 77336 RADIATION PHYSICS CONSULT: CPT | Performed by: RADIOLOGY

## 2022-10-20 PROCEDURE — 77386: CPT | Performed by: RADIOLOGY

## 2022-10-21 ENCOUNTER — HOSPITAL ENCOUNTER (OUTPATIENT)
Dept: RADIATION ONCOLOGY | Facility: HOSPITAL | Age: 47
Discharge: HOME | End: 2022-10-21

## 2022-10-21 DIAGNOSIS — C20 RECTAL CANCER: Primary | ICD-10-CM

## 2022-10-21 LAB
RAD ONC ARIA COURSE ID: NORMAL
RAD ONC ARIA COURSE LAST TREATMENT DATE: NORMAL
RAD ONC ARIA COURSE START DATE: NORMAL
RAD ONC ARIA COURSE TREATMENT ELAPSED DAYS: 32
RAD ONC ARIA FIRST TREATMENT DATE: NORMAL
RAD ONC ARIA PLAN FRACTIONS TREATED TO DATE: 25
RAD ONC ARIA PLAN ID: NORMAL
RAD ONC ARIA PLAN PRESCRIBED DOSE PER FRACTION: 1.8 GY
RAD ONC ARIA PLAN PRIMARY REFERENCE POINT: NORMAL
RAD ONC ARIA PLAN TOTAL FRACTIONS PRESCRIBED: 25
RAD ONC ARIA PLAN TOTAL PRESCRIBED DOSE: 4500 CGY
RAD ONC ARIA REFERENCE POINT DOSAGE GIVEN TO DATE: 45 GY
RAD ONC ARIA REFERENCE POINT ID: NORMAL
RAD ONC ARIA REFERENCE POINT SESSION DOSAGE GIVEN: 1.8 GY

## 2022-10-21 PROCEDURE — FACE2FACE: Performed by: RADIOLOGY

## 2022-10-21 PROCEDURE — 77014 CHG CT GUIDANCE RADIATION THERAPY FLDS PLACEMENT: CPT | Performed by: RADIOLOGY

## 2022-10-21 PROCEDURE — 77386: CPT | Performed by: RADIOLOGY

## 2022-10-24 ENCOUNTER — TELEMEDICINE (OUTPATIENT)
Dept: PSYCHIATRY | Facility: CLINIC | Age: 47
End: 2022-10-24

## 2022-10-24 DIAGNOSIS — F33.41 RECURRENT MAJOR DEPRESSIVE DISORDER, IN PARTIAL REMISSION: Chronic | ICD-10-CM

## 2022-10-24 DIAGNOSIS — F43.22 ADJUSTMENT DISORDER WITH ANXIOUS MOOD: Primary | ICD-10-CM

## 2022-10-24 LAB
RAD ONC ARIA COURSE ID: NORMAL
RAD ONC ARIA COURSE LAST TREATMENT DATE: NORMAL
RAD ONC ARIA COURSE START DATE: NORMAL
RAD ONC ARIA COURSE TREATMENT ELAPSED DAYS: 35
RAD ONC ARIA FIRST TREATMENT DATE: NORMAL
RAD ONC ARIA PLAN FRACTIONS TREATED TO DATE: 1
RAD ONC ARIA PLAN ID: NORMAL
RAD ONC ARIA PLAN PRESCRIBED DOSE PER FRACTION: 1.8 GY
RAD ONC ARIA PLAN PRIMARY REFERENCE POINT: NORMAL
RAD ONC ARIA PLAN TOTAL FRACTIONS PRESCRIBED: 3
RAD ONC ARIA PLAN TOTAL PRESCRIBED DOSE: 540 CGY
RAD ONC ARIA REFERENCE POINT DOSAGE GIVEN TO DATE: 1.8 GY
RAD ONC ARIA REFERENCE POINT ID: NORMAL
RAD ONC ARIA REFERENCE POINT SESSION DOSAGE GIVEN: 1.8 GY

## 2022-10-24 PROCEDURE — 77386: CPT | Performed by: RADIOLOGY

## 2022-10-24 PROCEDURE — 77014 CHG CT GUIDANCE RADIATION THERAPY FLDS PLACEMENT: CPT | Performed by: RADIOLOGY

## 2022-10-24 PROCEDURE — 77427 RADIATION TX MANAGEMENT X5: CPT | Performed by: RADIOLOGY

## 2022-10-24 PROCEDURE — 90834 PSYTX W PT 45 MINUTES: CPT | Performed by: SOCIAL WORKER

## 2022-10-24 NOTE — PROGRESS NOTES
Date: October 24, 2022  Time In: 1522  Time Out: 1610      PROGRESS NOTE  Data:  Yun Mcgowan is a 47 y.o. female who presents today for individual therapy session through the [facility]. This provider is located in [facility] The Patient is seen remotely at home  using [employer]. Patient is being seen via telehealth and stated they are in a secure environment for this session. The patient's condition being diagnosed/treated is appropriate for telemedicine. The provider identified herself as well as her credentials. The patient consents to be seen remotely, and when consent is given they understand that the consent allows for patient identifiable information to be sent to a third party as needed. They may refuse to be seen remotely at any time. The electronic data is encrypted and password protected, and the patient has been advised of the potential risks to privacy not withstanding such measures.     Patient presents this date for adjustment disorder with anxious mood. She is pleasant, alert and oriented to person, place and time. She spoke about the difficulties she has had with her medical care in what she describes as apathy from providers toward her, especially in regards to her emotional care. She is grateful for having only two radiation therapy treatments left. She will have surgery in 8-12 weeks and has concerns about ST disability and FMLA. These were briefly discussed and she was encouraged to meet with her . All of these things have impacted her anxiety and depressed mood. She continues to practice deep breathing and writing in her gratitude journal; both of these have helped. She has good support from her .       Clinical Maneuvering/Intervention:    (Scales based on 0 - 10 with 10 being the worst)  Depression: 7 Anxiety: 10       Assisted patient in processing above session content; acknowledged and normalized patient’s thoughts, feelings, and  concerns. Rationalized patient thought process regarding navigating through healthcare system; depression and anxiety. Discussed triggers associated with patient's anxiety and depression. Also discussed coping skills for patient to implement such as continuation of deep breathing, journaling, and adding the use of acupressure.    Allowed patient to freely discuss issues without interruption or judgment. Provided safe, confidential environment to facilitate the development of positive therapeutic relationship and encourage open, honest communication. Assisted patient in identifying risk factors which would indicate the need for higher level of care including thoughts to harm self or others and/or self-harming behavior and encouraged patient to contact this office, call 911, or present to the nearest emergency room should any of these events occur. Discussed crisis intervention services and means to access. Patient adamantly and convincingly denies current suicidal or homicidal ideation or perceptual disturbance.    Assessment   Patient appears to maintain relative stability as compared to their baseline. However, patient continues to struggle with depression and anxiety which continues to cause impairment in important areas of functioning. A result, they can be reasonably expected to continue to benefit from treatment and would likely be at increased risk for decompensation otherwise.    Mental Status Exam:   Hygiene:   good  Cooperation:  Cooperative  Eye Contact:  Good  Psychomotor Behavior:  Appropriate  Affect:  Appropriate  Mood: sad and anxious  Speech:  Normal  Thought Process:  Goal directed and Linear  Thought Content:  Normal  Suicidal:  None  Homicidal:  None  Hallucinations:  None  Delusion:  None  Memory:  Intact  Orientation:  Person, Place, Time and Situation  Reliability:  good  Insight:  Good  Judgement:  Good  Impulse Control:  Good  Physical/Medical Issues:  Yes in treatment for colorectal cancer      PHQ-Score Total:  PHQ-9 Total Score: Not completed   MARTINE-7 Total Score: Not completed       Patient's Support Network Includes:      Functional Status: Moderate impairment     Progress toward goal: Not at goal    Prognosis: Good with Ongoing Treatment          Plan     Resources: Patient was provided with the following community resources: None at this time.      Patient will continue in individual outpatient therapy with focus on improved functioning and coping skills, maintaining stability, and avoiding decompensation and the need for higher level of care.    Patient will adhere to any medication regimens as prescribed and report any side effects. Patient will contact this office, call 911 or present to the nearest emergency room should suicidal or homicidal ideations occur. Provide cognitive behavioral therapy and solution focused therapy to improve functioning, maintain stability and avoid decompensation and the need for higher level of care.     Return in about 2 weeks, or earlier if symptoms worsen or fail to improve.           VISIT DIAGNOSIS:     ICD-10-CM ICD-9-CM   1. Adjustment disorder with anxious mood  F43.22 309.24   2. Recurrent major depressive disorder, in partial remission (HCC)  F33.41 296.35            This document has been electronically signed by NARCISO Woodruff, MARLENE  October 24, 2022 16:38 EDT

## 2022-10-25 LAB
RAD ONC ARIA COURSE ID: NORMAL
RAD ONC ARIA COURSE LAST TREATMENT DATE: NORMAL
RAD ONC ARIA COURSE START DATE: NORMAL
RAD ONC ARIA COURSE TREATMENT ELAPSED DAYS: 36
RAD ONC ARIA FIRST TREATMENT DATE: NORMAL
RAD ONC ARIA PLAN FRACTIONS TREATED TO DATE: 2
RAD ONC ARIA PLAN ID: NORMAL
RAD ONC ARIA PLAN PRESCRIBED DOSE PER FRACTION: 1.8 GY
RAD ONC ARIA PLAN PRIMARY REFERENCE POINT: NORMAL
RAD ONC ARIA PLAN TOTAL FRACTIONS PRESCRIBED: 3
RAD ONC ARIA PLAN TOTAL PRESCRIBED DOSE: 540 CGY
RAD ONC ARIA REFERENCE POINT DOSAGE GIVEN TO DATE: 3.6 GY
RAD ONC ARIA REFERENCE POINT ID: NORMAL
RAD ONC ARIA REFERENCE POINT SESSION DOSAGE GIVEN: 1.8 GY

## 2022-10-25 PROCEDURE — 77014 CHG CT GUIDANCE RADIATION THERAPY FLDS PLACEMENT: CPT | Performed by: RADIOLOGY

## 2022-10-25 PROCEDURE — 77386: CPT | Performed by: RADIOLOGY

## 2022-10-26 ENCOUNTER — HOSPITAL ENCOUNTER (OUTPATIENT)
Dept: RADIATION ONCOLOGY | Facility: HOSPITAL | Age: 47
Discharge: HOME | End: 2022-10-26

## 2022-10-26 ENCOUNTER — RADIATION ONCOLOGY WEEKLY ASSESSMENT (OUTPATIENT)
Dept: RADIATION ONCOLOGY | Facility: HOSPITAL | Age: 47
End: 2022-10-26

## 2022-10-26 VITALS
RESPIRATION RATE: 18 BRPM | SYSTOLIC BLOOD PRESSURE: 162 MMHG | BODY MASS INDEX: 31.84 KG/M2 | OXYGEN SATURATION: 100 % | WEIGHT: 215 LBS | HEIGHT: 69 IN | HEART RATE: 100 BPM | TEMPERATURE: 98.5 F | DIASTOLIC BLOOD PRESSURE: 80 MMHG

## 2022-10-26 DIAGNOSIS — C20 RECTAL CANCER: Primary | ICD-10-CM

## 2022-10-26 LAB
RAD ONC ARIA COURSE ID: NORMAL
RAD ONC ARIA COURSE LAST TREATMENT DATE: NORMAL
RAD ONC ARIA COURSE START DATE: NORMAL
RAD ONC ARIA COURSE TREATMENT ELAPSED DAYS: 37
RAD ONC ARIA FIRST TREATMENT DATE: NORMAL
RAD ONC ARIA PLAN FRACTIONS TREATED TO DATE: 3
RAD ONC ARIA PLAN ID: NORMAL
RAD ONC ARIA PLAN PRESCRIBED DOSE PER FRACTION: 1.8 GY
RAD ONC ARIA PLAN PRIMARY REFERENCE POINT: NORMAL
RAD ONC ARIA PLAN TOTAL FRACTIONS PRESCRIBED: 3
RAD ONC ARIA PLAN TOTAL PRESCRIBED DOSE: 540 CGY
RAD ONC ARIA REFERENCE POINT DOSAGE GIVEN TO DATE: 5.4 GY
RAD ONC ARIA REFERENCE POINT ID: NORMAL
RAD ONC ARIA REFERENCE POINT SESSION DOSAGE GIVEN: 1.8 GY

## 2022-10-26 PROCEDURE — 77014 CHG CT GUIDANCE RADIATION THERAPY FLDS PLACEMENT: CPT | Performed by: RADIOLOGY

## 2022-10-26 PROCEDURE — 77386: CPT | Performed by: RADIOLOGY

## 2022-10-26 PROCEDURE — 77336 RADIATION PHYSICS CONSULT: CPT | Performed by: RADIOLOGY

## 2022-10-26 PROCEDURE — FACE2FACE: Performed by: RADIOLOGY

## 2022-10-31 LAB
RAD ONC ARIA COURSE END DATE: NORMAL
RAD ONC ARIA COURSE ID: NORMAL
RAD ONC ARIA COURSE LAST TREATMENT DATE: NORMAL
RAD ONC ARIA COURSE START DATE: NORMAL
RAD ONC ARIA COURSE TREATMENT ELAPSED DAYS: 37
RAD ONC ARIA FIRST TREATMENT DATE: NORMAL
RAD ONC ARIA PLAN FRACTIONS TREATED TO DATE: 25
RAD ONC ARIA PLAN FRACTIONS TREATED TO DATE: 3
RAD ONC ARIA PLAN ID: NORMAL
RAD ONC ARIA PLAN ID: NORMAL
RAD ONC ARIA PLAN NAME: NORMAL
RAD ONC ARIA PLAN NAME: NORMAL
RAD ONC ARIA PLAN PRESCRIBED DOSE PER FRACTION: 1.8 GY
RAD ONC ARIA PLAN PRESCRIBED DOSE PER FRACTION: 1.8 GY
RAD ONC ARIA PLAN PRIMARY REFERENCE POINT: NORMAL
RAD ONC ARIA PLAN PRIMARY REFERENCE POINT: NORMAL
RAD ONC ARIA PLAN TOTAL FRACTIONS PRESCRIBED: 25
RAD ONC ARIA PLAN TOTAL FRACTIONS PRESCRIBED: 3
RAD ONC ARIA PLAN TOTAL PRESCRIBED DOSE: 4500 CGY
RAD ONC ARIA PLAN TOTAL PRESCRIBED DOSE: 540 CGY
RAD ONC ARIA REFERENCE POINT DOSAGE GIVEN TO DATE: 45 GY
RAD ONC ARIA REFERENCE POINT DOSAGE GIVEN TO DATE: 5.4 GY
RAD ONC ARIA REFERENCE POINT ID: NORMAL
RAD ONC ARIA REFERENCE POINT ID: NORMAL

## 2022-11-17 ENCOUNTER — OFFICE VISIT (OUTPATIENT)
Dept: PSYCHIATRY | Facility: CLINIC | Age: 47
End: 2022-11-17

## 2022-11-17 DIAGNOSIS — F41.0 GENERALIZED ANXIETY DISORDER WITH PANIC ATTACKS: Primary | Chronic | ICD-10-CM

## 2022-11-17 DIAGNOSIS — F41.1 GENERALIZED ANXIETY DISORDER WITH PANIC ATTACKS: Primary | Chronic | ICD-10-CM

## 2022-11-17 DIAGNOSIS — F33.41 RECURRENT MAJOR DEPRESSIVE DISORDER, IN PARTIAL REMISSION: Chronic | ICD-10-CM

## 2022-11-17 PROCEDURE — 90837 PSYTX W PT 60 MINUTES: CPT | Performed by: SOCIAL WORKER

## 2022-11-17 NOTE — PROGRESS NOTES
Date: November 17, 2022  Time In:1529  Time Out: 1625      PROGRESS NOTE  Data:  Yun Mcgowan is a 47 y.o. female who presents today for individual therapy session at [facility] with NARCISO Woodruff LCSW.       Clinical Maneuvering/Intervention: Mateo is pleasant alert and oriented to person place and time.  She spoke about meeting with her boss and HR staff regarding her job which will be terminated January 12 unless she is able to go back to work.  She does not feel like she is going to be able to come back to work by January 12.  She is especially disappointed in the interaction of her boss and coworkers and checking on her during her illness.  We talked about how this has impacted her mood and anxiety.    Assisted patient in processing above session content; acknowledged and normalized patient’s thoughts, feelings, and concerns. Rationalized patient thought process regarding the potential loss of her job. Discussed triggers associated with patient's anxiety depression. Also discussed coping skills for patient to implement such as journaling, she is to continue deep breathing and acupressure.    Allowed patient to freely discuss issues without interruption or judgment. Provided safe, confidential environment to facilitate the development of positive therapeutic relationship and encourage open, honest communication. Assisted patient in identifying risk factors which would indicate the need for higher level of care including thoughts to harm self or others and/or self-harming behavior and encouraged patient to contact this office, call 911, or present to the nearest emergency room should any of these events occur. Discussed crisis intervention services and means to access. Patient adamantly and convincingly denies current suicidal or homicidal ideation or perceptual disturbance.    Assessment   Patient appears to maintain relative stability as compared to their baseline. However, patient  continues to struggle with depression and anxiety which continues to cause impairment in important areas of functioning. A result, they can be reasonably expected to continue to benefit from treatment and would likely be at increased risk for decompensation otherwise.    Mental Status Exam:   Hygiene:   good  Cooperation:  Cooperative  Eye Contact:  Good  Psychomotor Behavior:  Appropriate  Affect:  Appropriate  Mood: sad and anxious  Speech:  Normal  Thought Process:  Goal directed and Linear  Thought Content:  Normal  Suicidal:  None  Homicidal:  None  Hallucinations:  None  Delusion:  None  Memory:  Intact  Orientation:  Person, Place, Time and Situation  Reliability:  good  Insight:  Good  Judgement:  Good  Impulse Control:  Good  Physical/Medical Issues:  see diagnoses list; significant for history of cancer     PHQ-Score Total:  PHQ-9 Total Score: PHQ-9 Depression Screening  Little interest or pleasure in doing things? 0-->not at all   Feeling down, depressed, or hopeless? 0-->not at all   Trouble falling or staying asleep, or sleeping too much? 1-->several days   Feeling tired or having little energy? 1-->several days   Poor appetite or overeating? 0-->not at all   Feeling bad about yourself - or that you are a failure or have let yourself or your family down? 0-->not at all   Trouble concentrating on things, such as reading the newspaper or watching television? 0-->not at all   Moving or speaking so slowly that other people could have noticed? Or the opposite - being so fidgety or restless that you have been moving around a lot more than usual? 0-->not at all   Thoughts that you would be better off dead, or of hurting yourself in some way? 0-->not at all   PHQ-9 Total Score 2   If you checked off any problems, how difficult have these problems made it for you to do your work, take care of things at home, or get along with other people? somewhat difficult       MARTINE-7 Total Score:   Over the last two weeks,  how often have you been bothered by the following problems?  Feeling nervous, anxious or on edge: Several days  Not being able to stop or control worrying: Several days  Worrying too much about different things: Several days  Trouble Relaxing: Several days  Being so restless that it is hard to sit still: Not at all  Becoming easily annoyed or irritable: Not at all  Feeling afraid as if something awful might happen: Not at all  MARTINE 7 Total Score: 4  If you checked any problems, how difficult have these problems made it for you to do your work, take care of things at home, or get along with other people: Not difficult at all       Patient's Support Network Includes:      Functional Status: Moderate impairment     Progress toward goal: Not at goal    Prognosis: Good with Ongoing Treatment          Plan     Resources: Patient was provided with the following community resources: Gave her resources for triage cancer, Social Security disability, [employer] , encouraged her to complete financial assistance forms at hospitals that she would be doing care with in 2023, and gave her oncology resources.    Patient will continue in individual outpatient therapy with focus on improved functioning and coping skills, maintaining stability, and avoiding decompensation and the need for higher level of care.    Patient will adhere to any medication regimens as prescribed and report any side effects. Patient will contact this office, call 911 or present to the nearest emergency room should suicidal or homicidal ideations occur. Provide cognitive behavioral therapy and solution focused therapy to improve functioning, maintain stability and avoid decompensation and the need for higher level of care.     Return in about 2  weeks, or earlier if symptoms worsen or fail to improve.           VISIT DIAGNOSIS:     ICD-10-CM ICD-9-CM   1. Generalized anxiety disorder with panic attacks  F41.1 300.02    F41.0 300.01   2.  Recurrent major depressive disorder, in partial remission ([facility])  F33.41 296.35            This document has been electronically signed by NARCISO Woodruff, LCSW   November 17, 2022 16:34 EST      Part of this note may be an electronic transcription/translation of spoken language to printed text using the Dragon Dictation System.

## 2022-12-01 ENCOUNTER — HOSPITAL ENCOUNTER (OUTPATIENT)
Dept: RADIATION ONCOLOGY | Facility: HOSPITAL | Age: 47
Setting detail: RADIATION/ONCOLOGY SERIES
End: 2022-12-01
Payer: COMMERCIAL

## 2022-12-01 ENCOUNTER — OFFICE VISIT (OUTPATIENT)
Dept: PSYCHIATRY | Facility: CLINIC | Age: 47
End: 2022-12-01

## 2022-12-01 DIAGNOSIS — F41.0 GENERALIZED ANXIETY DISORDER WITH PANIC ATTACKS: Chronic | ICD-10-CM

## 2022-12-01 DIAGNOSIS — F41.1 GENERALIZED ANXIETY DISORDER WITH PANIC ATTACKS: Chronic | ICD-10-CM

## 2022-12-01 DIAGNOSIS — F33.41 RECURRENT MAJOR DEPRESSIVE DISORDER, IN PARTIAL REMISSION: Primary | Chronic | ICD-10-CM

## 2022-12-01 PROCEDURE — 90837 PSYTX W PT 60 MINUTES: CPT | Performed by: SOCIAL WORKER

## 2022-12-01 NOTE — PROGRESS NOTES
RADIATION ONCOLOGY FOLLOW-UP NOTE    NAME: Yun Mcgowan  YOB: 1975  MRN #: [id number]  DATE OF SERVICE: 12/06/2022  REFERRING PROVIDER: Anusha Lang MD  [address] [place] [id number]  PRIMARY CARE PROVIDER: Anusha Lang MD    DIAGNOSIS:  Locally advanced rectal adenocarcinoma  JESSICA chemo followed by chemoXRT  -surgical eval coming early January 2023.  1. Rectal cancer (HCC)      REASON FOR VISIT:  1M s/p XRT F/U    RADIATION TREATMENT COURSE:  4500 cGy in 25 fractions, followed by 540 cGy in 3 fraction boost, completed 10/26/2022.    HISTORY OF PRESENT ILLNESS: The patient is a 47 y.o. year old female who was last seen in our office on 10/26/2022 upon completion of radiation therapy treatment.  She received 8 cycles of FOLFOX prior to chemoXRT.    She follows with Dr. Frances at [facility], and was last seen by DAYANA Dumont on 11/08/2022. Their plan is to undergo scans in January, followed by surgery in January as well; start IV fluids weekly and continue pushing oral intake as much as possible; and follow up in 1 month with the physician.    No imaging over the interval.    Dr. Napoles is planning surgery for 1/25/2023---MRI pelvis 1/4/2023; Flex sig on 1/6/2023 and then pre-op discussion on 1/10/2023.      The following portions of the patient's history were reviewed and updated as appropriate: allergies, current medications, past family history, past medical history, past social history, past surgical history and problem list. Reviewed with the patient and remain unchanged.    PAST MEDICAL HISTORY:  she has a past medical history of Adenocarcinoma of rectum (HCC), Adenoma of liver, Alopecia, Anxiety, Diabetes mellitus (HCC), Disease of thyroid gland, GERD (gastroesophageal reflux disease), Graves disease, Hyperlipidemia, Hypertension, and IBS (irritable bowel syndrome).    MEDICATIONS:    Current Outpatient Medications:   •  acetaminophen-codeine  (TYLENOL with CODEINE #3) 300-30 MG per tablet, Take 1 tablet by mouth., Disp: , Rfl:   •  ALPRAZolam (XANAX) 0.5 MG tablet, Take 0.5 mg by mouth 2 (Two) Times a Day As Needed for Anxiety., Disp: , Rfl:   •  baclofen (LIORESAL) 10 MG tablet, Take 10 mg by mouth 3 (Three) Times a Day., Disp: , Rfl:   •  buPROPion XL (WELLBUTRIN XL) 300 MG 24 hr tablet, Take 300 mg by mouth Every Morning., Disp: , Rfl:   •  Continuous Blood Gluc Sensor (FreeStyle Fang 2 Sensor) [facility], USE ONE SENSOR EVERY 14 (FOURTEEN) DAYS AS DIRECTED, Disp: , Rfl:   •  cyclobenzaprine (FLEXERIL) 10 MG tablet, Take 10 mg by mouth., Disp: , Rfl:   •  desvenlafaxine (Pristiq) 50 MG 24 hr tablet, Take 1 tablet by mouth Every Morning., Disp: 90 tablet, Rfl: 1  •  dicyclomine (BENTYL) 10 MG capsule, Take 10 mg by mouth 4 (Four) Times a Day., Disp: , Rfl:   •  diphenoxylate-atropine (LOMOTIL) 2.5-0.025 MG per tablet, Take 1 tablet by mouth 4 (Four) Times a Day As Needed for Diarrhea., Disp: 40 tablet, Rfl: 0  •  fexofenadine (ALLEGRA) 60 MG tablet, Take 60 mg by mouth Daily., Disp: , Rfl:   •  hydrocortisone (ANUSOL-HC) 25 MG suppository, Insert 1 suppository into the rectum 2 (Two) Times a Day., Disp: 24 suppository, Rfl: 1  •  ibuprofen (ADVIL,MOTRIN) 800 MG tablet, Take  by mouth., Disp: , Rfl:   •  Insulin Degludec (Tresiba FlexTouch) 200 UNIT/ML solution pen-injector pen injection, Inject 80 Units under the skin into the appropriate area as directed Daily., Disp: , Rfl:   •  Insulin Lispro (humaLOG) 100 UNIT/ML injection, Inject 30 Units under the skin into the appropriate area as directed 3 (Three) Times a Day Before Meals., Disp: , Rfl:   •  lactulose (CHRONULAC) 10 GM/15ML solution, TAKE 30 MILLILITER BY ORAL ROUTE EVERY DAY FOR CONSTIPATION, Disp: , Rfl:   •  lisinopril (PRINIVIL,ZESTRIL) 10 MG tablet, Take 10 mg by mouth Daily., Disp: , Rfl:   •  magnesium gluconate (MAGONATE) 500 MG tablet, Take 27 mg by mouth Daily., Disp: , Rfl:   •  Melatonin  10 MG tablet, Take  by mouth., Disp: , Rfl:   •  Naltrexone HCl (DEPADE PO), Take 4 mg by mouth Every Night., Disp: , Rfl:   •  nitrofurantoin, macrocrystal-monohydrate, (Macrobid) 100 MG capsule, Take 1 capsule by mouth 2 (Two) Times a Day., Disp: 14 capsule, Rfl: 0  •  ondansetron (ZOFRAN) 4 MG tablet, TAKE 1 TABLET BY ORAL ROUTE EVERY 4- 6 HOURS AS NEEDED FOR NAUSEA, Disp: , Rfl:   •  pantoprazole (PROTONIX) 40 MG EC tablet, Take 40 mg by mouth Daily., Disp: , Rfl:   •  promethazine (PHENERGAN) 25 MG tablet, TAKE 1 TABLET BY MOUTH EVERY 4 TO 6 HOURS AS NEEDED, Disp: , Rfl:   •  rizatriptan (MAXALT) 10 MG tablet, Take 10 mg by mouth 1 (One) Time As Needed for Migraine. May repeat in 2 hours if needed, Disp: , Rfl:   •  Semaglutide, 1 MG/DOSE, (Ozempic, 1 MG/DOSE,) 2 MG/1.5ML solution pen-injector, Inject 1 mg under the skin into the appropriate area as directed 1 (One) Time Per Week., Disp: , Rfl:   •  silver sulfadiazine (SILVADENE, SSD) 1 % cream, Apply 1 application topically to the appropriate area as directed 2 (Two) Times a Day., Disp: 50 g, Rfl: 2  •  topiramate (TOPAMAX) 25 MG capsule (sprinkle), Take 25 mg by mouth 2 (Two) Times a Day., Disp: , Rfl:   •  vitamin B-12 (CYANOCOBALAMIN) 1000 MCG tablet, Take 1,000 mcg by mouth Daily., Disp: , Rfl:   •  vitamin D3 125 MCG (5000 UT) capsule capsule, Take 5,000 Units by mouth Daily., Disp: , Rfl:     ALLERGIES:    Allergies   Allergen Reactions   • Wound Dressing Adhesive Itching     Skin gets itchy and irritated       PAST SURGICAL HISTORY:  she has a past surgical history that includes Colonoscopy and Laparoscopic oophorectomy (Bilateral).    PREVIOUS RADIOTHERAPY OR CHEMOTHERAPY:  chemoXRT    FAMILY HISTORY: non-contributory to today's visit    SOCIAL HISTORY:  she reports that she has never smoked. She has never used smokeless tobacco. She reports current alcohol use of about 2.0 standard drinks per week. She reports current drug use. Drug:  "Marijuana.    PAIN AND PAIN MANAGEMENT:  No pain.  Vitals:    12/06/22 1419   BP: 138/74   Pulse: 100   Resp: 18   Temp: 98.4 °F (36.9 °C)   TempSrc: Oral   SpO2: 100%   Weight: 95.7 kg (211 lb)   Height: 175.3 cm (69\")   PainSc: 0-No pain       NUTRITIONAL STATUS:   no issues    KPS:  90      REVIEW OF SYSTEMS:   General: No fevers, chills, weight change, or drenching night sweats. Skin: No rashes or jaundice.  HEENT: No change in vision or hearing, no headaches.  Neck: No dysphagia or masses.  Heme/Lymph: No easy bruising or bleeding.  Respiratory System: No shortness of breath or cough.  Cardiovascular: No chest pain, palpitations, or dyspnea on exertion. - Pacemaker. GI: No nausea, vomiting, diarrhea, melena, or hematochezia.  : No dysuria or hematuria.  Endocrine: No heat or cold intolerance. Musculoskeletal: No myalgias or arthralgias.  Neuro: No weakness, numbness, syncope, or seizures. Psych: No mood changes or depression. Ext: Denies swelling.        Objective   VITAL SIGNS:  Vitals:    12/06/22 1419   BP: 138/74   Pulse: 100   Resp: 18   Temp: 98.4 °F (36.9 °C)   TempSrc: Oral   SpO2: 100%   Weight: 95.7 kg (211 lb)   Height: 175.3 cm (69\")   PainSc: 0-No pain       PHYSICAL EXAM:  GENERAL: In no apparent distress, sitting comfortably in room.    HEENT: Normocephalic, atraumatic. Pupils are equal, round, reactive to light. Sclera anicteric. Conjunctiva not injected. Oropharynx without erythema, ulcerations or thrush.   NECK: Supple with no masses.  LYMPHATIC: No cervical, supraclavicular or axillary adenopathy appreciated bilaterally.   CARDIOVASCULAR: S1 & S2 detected; no murmurs, rubs or gallops.  CHEST: Clear to auscultation bilaterally; no wheezes, crackles or rubs. Work of breathing normal.  ABDOMEN: Bowel sounds present. Abdomen is soft, nontender, nondistended.   MUSCULOSKELETAL: No tenderness to palpation along the spine or scapulae. Normal range of motion.  EXTREMITIES: No clubbing, cyanosis, " edema.  SKIN: No erythema, rashes, ulcerations noted.   NEUROLOGIC: Cranial nerves II-XII grossly intact bilaterally. No focal neurologic deficits.  PSYCHIATRIC:  Alert, aware, and appropriate.  RECTAL EXAMINATION: Resolution of dermatitis.    PERTINENT IMAGING/PATHOLOGY/LABS (Medical Decision Making):     COORDINATION OF CARE: A copy of this note is sent to the referring provider.    PATHOLOGY (Reviewed):     IMAGING (Reviewed):     LABS (Reviewed):  HEMATOLOGY:  WBC   Date Value Ref Range Status   10/10/2022 4.20 3.40 - 10.80 10*3/mm3 Final   11/07/2020 10.30 4.5 - 11.0 10*3/uL Final     RBC   Date Value Ref Range Status   10/10/2022 3.85 3.77 - 5.28 10*6/mm3 Final   11/07/2020 4.17 4.0 - 5.2 10*6/uL Final     Hemoglobin   Date Value Ref Range Status   10/10/2022 11.9 (L) 12.0 - 15.9 g/dL Final   11/07/2020 12.4 12.0 - 16.0 g/dL Final     Hematocrit   Date Value Ref Range Status   10/10/2022 35.4 34.0 - 46.6 % Final   11/07/2020 36.8 36.0 - 46.0 % Final     Platelets   Date Value Ref Range Status   10/10/2022 109 (L) 140 - 450 10*3/mm3 Final   11/07/2020 233 140 - 440 10*3/uL Final     CHEMISTRY:  Lab Results   Component Value Date    GLUCOSE 206 (H) 10/10/2022    BUN 6 10/10/2022    CREATININE 0.66 10/10/2022    BCR 9.1 10/10/2022    K 4.0 10/10/2022    CO2 22.0 10/10/2022    CALCIUM 9.2 10/10/2022       Assessment & Plan   ASSESSMENT AND PLAN:    1. Rectal cancer (HCC)    JESSICA--> chemoXRT   -Tolerated chemoXRT  Upcoming imaging and evaluation with Dr. Napoles an surgical planning.    This assessment comes from my review of the imaging, pathology, physician notes and other pertinent information as mentioned.    DISPOSITION: FU here in 6 months or earlier as needed.          CC: MD Paige Richards MD John A Cox, MD  12/6/2022  5:07 PM EST         Alert and oriented to person, place and time

## 2022-12-01 NOTE — PROGRESS NOTES
Date: December 1, 2022  Time In: 1120  Time Out: 1216      PROGRESS NOTE  Data:  Yun Mcgowan is a 47 y.o. female who presents today for individual therapy session at [facility] with NARCISO Woodruff LCSW.       Clinical Maneuvering/Intervention: Yun is pleasant with person place and time.  She spoke about the difficulty she was having with problems with constipation despite the use of MiraLAX, Benefiber, and stool softeners; related to her cancer history.  She is open to having a referral to [facility] and a referral was sent via an basket.  She talked about how this has affected her mood and anxiety.  She is also expressed her frustration with coworkers who have not reached out to her since her illness.  As well as, not feeling as if her emotional needs were met at her doctor's visits during early treatment.  We talked about the use of EMDR in helping to manage this.  She is open to doing this.  She also spoke about communicating with others and she feels that they cut her off when she is talking or do not talk to her at all feeling like she cannot handle things on her own.    Assisted patient in processing above session content; acknowledged and normalized patient’s thoughts, feelings, and concerns. Rationalized patient thought process regarding feelings related to her emotional needs being met. Discussed triggers associated with patient's depression and anxiety. Also discussed coping skills for patient to implement such as continuing to use breathing, acupressure and journaling..    Allowed patient to freely discuss issues without interruption or judgment. Provided safe, confidential environment to facilitate the development of positive therapeutic relationship and encourage open, honest communication. Assisted patient in identifying risk factors which would indicate the need for higher level of care including thoughts to harm self or others and/or self-harming behavior and encouraged  patient to contact this office, call 911, or present to the nearest emergency room should any of these events occur. Discussed crisis intervention services and means to access. Patient adamantly and convincingly denies current suicidal or homicidal ideation or perceptual disturbance.    Assessment   Patient appears to maintain relative stability as compared to their baseline. However, patient continues to struggle with depression and anxiety which continues to cause impairment in important areas of functioning. A result, they can be reasonably expected to continue to benefit from treatment and would likely be at increased risk for decompensation otherwise.    Mental Status Exam:   Hygiene:   good  Cooperation:  Cooperative  Eye Contact:  Good  Psychomotor Behavior:  Appropriate  Affect:  Appropriate  Mood: anxious  Speech:  Normal  Thought Process:  Goal directed and Linear  Thought Content:  Normal  Suicidal:  None  Homicidal:  None  Hallucinations:  None  Delusion:  None  Memory:  Intact  Orientation:  Person, Place, Time and Situation  Reliability:  good  Insight:  Good  Judgement:  Good  Impulse Control:  Good  Physical/Medical Issues:  Yes see diagnosis list      PHQ-Score Total:  PHQ-9 Total Score: PHQ-9 Depression Screening  Little interest or pleasure in doing things? 0-->not at all   Feeling down, depressed, or hopeless? 1-->several days   Trouble falling or staying asleep, or sleeping too much? 1-->several days   Feeling tired or having little energy? 1-->several days   Poor appetite or overeating? 1-->several days   Feeling bad about yourself - or that you are a failure or have let yourself or your family down? 0-->not at all   Trouble concentrating on things, such as reading the newspaper or watching television? 0-->not at all   Moving or speaking so slowly that other people could have noticed? Or the opposite - being so fidgety or restless that you have been moving around a lot more than usual? 0-->not  at all   Thoughts that you would be better off dead, or of hurting yourself in some way? 0-->not at all   PHQ-9 Total Score 4   If you checked off any problems, how difficult have these problems made it for you to do your work, take care of things at home, or get along with other people? somewhat difficult       MARTINE-7 Total Score:   Over the last two weeks, how often have you been bothered by the following problems?  Feeling nervous, anxious or on edge: Several days  Not being able to stop or control worrying: Not at all  Worrying too much about different things: Several days  Trouble Relaxing: Several days  Being so restless that it is hard to sit still: Not at all  Becoming easily annoyed or irritable: Not at all  Feeling afraid as if something awful might happen: Not at all  MARTINE 7 Total Score: 3  If you checked any problems, how difficult have these problems made it for you to do your work, take care of things at home, or get along with other people: Somewhat difficult       Patient's Support Network Includes:      Functional Status: Moderate impairment     Progress toward goal: Not at goal    Prognosis: Good with Ongoing Treatment          Plan     Resources: Patient was provided with the following community resources: referral to [facility] at the cancer center.     Patient will continue in individual outpatient therapy with focus on improved functioning and coping skills, maintaining stability, and avoiding decompensation and the need for higher level of care.    Patient will adhere to any medication regimens as prescribed and report any side effects. Patient will contact this office, call 911 or present to the nearest emergency room should suicidal or homicidal ideations occur. Provide cognitive behavioral therapy and solution focused therapy to improve functioning, maintain stability and avoid decompensation and the need for higher level of care.     Return in about 2 weeks, or earlier if  symptoms worsen or fail to improve.           VISIT DIAGNOSIS:     ICD-10-CM ICD-9-CM   1. Recurrent major depressive disorder, in partial remission (HCC)  F33.41 296.35   2. Generalized anxiety disorder with panic attacks  F41.1 300.02    F41.0 300.01            This document has been electronically signed by NARCISO Woodruff, CHERIEW   December 1, 2022 13:15 EST      Part of this note may be an electronic transcription/translation of spoken language to printed text using the Dragon Dictation System.

## 2022-12-05 ENCOUNTER — HOSPITAL ENCOUNTER (OUTPATIENT)
Dept: RADIATION ONCOLOGY | Facility: HOSPITAL | Age: 47
Setting detail: RADIATION/ONCOLOGY SERIES
End: 2022-12-05
Payer: COMMERCIAL

## 2022-12-06 ENCOUNTER — OFFICE VISIT (OUTPATIENT)
Dept: RADIATION ONCOLOGY | Facility: HOSPITAL | Age: 47
End: 2022-12-06
Payer: COMMERCIAL

## 2022-12-06 VITALS
SYSTOLIC BLOOD PRESSURE: 138 MMHG | OXYGEN SATURATION: 100 % | BODY MASS INDEX: 31.25 KG/M2 | HEART RATE: 100 BPM | HEIGHT: 69 IN | DIASTOLIC BLOOD PRESSURE: 74 MMHG | RESPIRATION RATE: 18 BRPM | WEIGHT: 211 LBS | TEMPERATURE: 98.4 F

## 2022-12-06 DIAGNOSIS — C20 RECTAL CANCER: ICD-10-CM

## 2022-12-06 PROCEDURE — G0463 HOSPITAL OUTPT CLINIC VISIT: HCPCS | Performed by: RADIOLOGY

## 2022-12-06 PROCEDURE — 99024 POSTOP FOLLOW-UP VISIT: CPT | Performed by: RADIOLOGY

## 2022-12-13 ENCOUNTER — OFFICE (AMBULATORY)
Dept: URBAN - METROPOLITAN AREA CLINIC 64 | Facility: CLINIC | Age: 47
End: 2022-12-13
Payer: COMMERCIAL

## 2022-12-13 VITALS
WEIGHT: 211 LBS | HEART RATE: 105 BPM | SYSTOLIC BLOOD PRESSURE: 127 MMHG | DIASTOLIC BLOOD PRESSURE: 73 MMHG | HEIGHT: 69 IN

## 2022-12-13 DIAGNOSIS — K75.81 NONALCOHOLIC STEATOHEPATITIS (NASH): ICD-10-CM

## 2022-12-13 DIAGNOSIS — R19.4 CHANGE IN BOWEL HABIT: ICD-10-CM

## 2022-12-13 DIAGNOSIS — K62.5 HEMORRHAGE OF ANUS AND RECTUM: ICD-10-CM

## 2022-12-13 DIAGNOSIS — K74.69 OTHER CIRRHOSIS OF LIVER: ICD-10-CM

## 2022-12-13 DIAGNOSIS — C20 MALIGNANT NEOPLASM OF RECTUM: ICD-10-CM

## 2022-12-13 PROCEDURE — 99214 OFFICE O/P EST MOD 30 MIN: CPT | Performed by: INTERNAL MEDICINE

## 2022-12-15 ENCOUNTER — OFFICE VISIT (OUTPATIENT)
Dept: PSYCHIATRY | Facility: CLINIC | Age: 47
End: 2022-12-15

## 2022-12-15 DIAGNOSIS — F33.41 RECURRENT MAJOR DEPRESSIVE DISORDER, IN PARTIAL REMISSION: Chronic | ICD-10-CM

## 2022-12-15 DIAGNOSIS — F41.1 GENERALIZED ANXIETY DISORDER WITH PANIC ATTACKS: Primary | Chronic | ICD-10-CM

## 2022-12-15 DIAGNOSIS — F41.0 GENERALIZED ANXIETY DISORDER WITH PANIC ATTACKS: Primary | Chronic | ICD-10-CM

## 2022-12-15 PROCEDURE — 90837 PSYTX W PT 60 MINUTES: CPT | Performed by: SOCIAL WORKER

## 2022-12-15 NOTE — PROGRESS NOTES
Date: December 15, 2022  Time In: 1415  Time Out: 1524      PROGRESS NOTE  Data:  Yun Mcgowan is a 47 y.o. female who presents today for individual therapy session at [facility] with NARCISO Woodruff LCSW.       Clinical Maneuvering/Intervention: Yun is pleasant alert and oriented to person place and time.  She spoke about losing 3 friends who have been diagnosed with cancer and her feelings with both their loss and the prospect that she may not survive her own cancer journey.  She is scheduled for surgery on January 25.  She also talked about how she communicates with others and feels frustrated at times with not feeling listened to.    Assisted patient in processing above session content; acknowledged and normalized patient’s thoughts, feelings, and concerns. Rationalized patient thought process regarding grief and communication. Discussed triggers associated with patient's depression and anxiety. Also discussed coping skills for patient to implement such as continuing coping skills.  Communication skills-recognizing when people throw out red herrings, priming difficult conversations and asking the person being communicated with to stay on task with the topic..    Allowed patient to freely discuss issues without interruption or judgment. Provided safe, confidential environment to facilitate the development of positive therapeutic relationship and encourage open, honest communication. Assisted patient in identifying risk factors which would indicate the need for higher level of care including thoughts to harm self or others and/or self-harming behavior and encouraged patient to contact this office, call 911, or present to the nearest emergency room should any of these events occur. Discussed crisis intervention services and means to access. Patient adamantly and convincingly denies current suicidal or homicidal ideation or perceptual disturbance.    Assessment   Patient appears to  maintain relative stability as compared to their baseline. However, patient continues to struggle with depression and anxiety which continues to cause impairment in important areas of functioning. A result, they can be reasonably expected to continue to benefit from treatment and would likely be at increased risk for decompensation otherwise.    Mental Status Exam:   Hygiene:   good  Cooperation:  Cooperative  Eye Contact:  Good  Psychomotor Behavior:  Appropriate  Affect:  Appropriate  Mood: anxious  Speech:  Normal  Thought Process:  Goal directed and Linear  Thought Content:  Normal  Suicidal:  None  Homicidal:  None  Hallucinations:  None  Delusion:  None  Memory:  Intact  Orientation:  Person, Place, Time and Situation  Reliability:  good  Insight:  Good  Judgement:  Good  Impulse Control:  Good  Physical/Medical Issues:  Yes see diagnoses list      PHQ-Score Total:  PHQ-9 Total Score: PHQ-9 Depression Screening  Little interest or pleasure in doing things? 0-->not at all   Feeling down, depressed, or hopeless? 1-->several days   Trouble falling or staying asleep, or sleeping too much? 1-->several days   Feeling tired or having little energy? 1-->several days   Poor appetite or overeating? 1-->several days   Feeling bad about yourself - or that you are a failure or have let yourself or your family down? 0-->not at all   Trouble concentrating on things, such as reading the newspaper or watching television? 1-->several days   Moving or speaking so slowly that other people could have noticed? Or the opposite - being so fidgety or restless that you have been moving around a lot more than usual? 0-->not at all   Thoughts that you would be better off dead, or of hurting yourself in some way? 0-->not at all   PHQ-9 Total Score 5   If you checked off any problems, how difficult have these problems made it for you to do your work, take care of things at home, or get along with other people? somewhat difficult        MARTINE-7 Total Score:   Over the last two weeks, how often have you been bothered by the following problems?  Feeling nervous, anxious or on edge: Several days  Not being able to stop or control worrying: Not at all  Worrying too much about different things: Several days  Trouble Relaxing: Several days  Being so restless that it is hard to sit still: Not at all  Becoming easily annoyed or irritable: Not at all  Feeling afraid as if something awful might happen: Several days  MARTINE 7 Total Score: 4  If you checked any problems, how difficult have these problems made it for you to do your work, take care of things at home, or get along with other people: Somewhat difficult       Patient's Support Network Includes:      Functional Status: Moderate impairment     Progress toward goal: Not at goal    Prognosis: Good with Ongoing Treatment          Plan     Resources: Patient was provided with the following community resources: Cancer resources for support groups     Patient will continue in individual outpatient therapy with focus on improved functioning and coping skills, maintaining stability, and avoiding decompensation and the need for higher level of care.    Patient will adhere to any medication regimens as prescribed and report any side effects. Patient will contact this office, call 911 or present to the nearest emergency room should suicidal or homicidal ideations occur. Provide cognitive behavioral therapy and solution focused therapy to improve functioning, maintain stability and avoid decompensation and the need for higher level of care.     Return in about 3 weeks, or earlier if symptoms worsen or fail to improve.           VISIT DIAGNOSIS:     ICD-10-CM ICD-9-CM   1. Generalized anxiety disorder with panic attacks  F41.1 300.02    F41.0 300.01   2. Recurrent major depressive disorder, in partial remission (HCC)  F33.41 296.35            This document has been electronically signed by Marsha Alvarez  NARCISO, MARLENE   December 15, 2022 16:45 EST      Part of this note may be an electronic transcription/translation of spoken language to printed text using the Dragon Dictation System.

## 2023-01-11 ENCOUNTER — OFFICE VISIT (OUTPATIENT)
Dept: PSYCHIATRY | Facility: CLINIC | Age: 48
End: 2023-01-11
Payer: COMMERCIAL

## 2023-01-11 DIAGNOSIS — F33.41 RECURRENT MAJOR DEPRESSIVE DISORDER, IN PARTIAL REMISSION: Primary | Chronic | ICD-10-CM

## 2023-01-11 DIAGNOSIS — F41.1 GENERALIZED ANXIETY DISORDER WITH PANIC ATTACKS: Chronic | ICD-10-CM

## 2023-01-11 DIAGNOSIS — F41.0 GENERALIZED ANXIETY DISORDER WITH PANIC ATTACKS: Chronic | ICD-10-CM

## 2023-01-11 PROCEDURE — 90834 PSYTX W PT 45 MINUTES: CPT | Performed by: SOCIAL WORKER

## 2023-01-11 NOTE — PROGRESS NOTES
Date: January 11, 2023  Time In: 1417  Time Out: 1500      PROGRESS NOTE  Data:  Yun Mcgowan is a 47 y.o. female who presents today for individual therapy session at [facility] with NARCISO Woodruff LCSW.       Clinical Maneuvering/Intervention: Yun is pleasant alert and oriented to person place and time.  She verbalized concerns with her upcoming surgery on the 25th.  She has been utilizing breathing and her gratitude journal to help with anxiety.  She also spoke about how she is communicating with individuals differently and feels like this has been helpful.      Assisted patient in processing above session content; acknowledged and normalized patient’s thoughts, feelings, and concerns. Rationalized patient thought process regarding her health and upcoming surgery. Discussed triggers associated with patient's mood and anxiety. Also discussed coping skills for patient to implement such as adding guided imagery to her skill set.    Allowed patient to freely discuss issues without interruption or judgment. Provided safe, confidential environment to facilitate the development of positive therapeutic relationship and encourage open, honest communication. Assisted patient in identifying risk factors which would indicate the need for higher level of care including thoughts to harm self or others and/or self-harming behavior and encouraged patient to contact this office, call 911, or present to the nearest emergency room should any of these events occur. Discussed crisis intervention services and means to access. Patient adamantly and convincingly denies current suicidal or homicidal ideation or perceptual disturbance.    Assessment   Patient appears to maintain relative stability as compared to their baseline. However, patient continues to struggle with depression and anxiety which continues to cause impairment in important areas of functioning. A result, they can be reasonably expected to  continue to benefit from treatment and would likely be at increased risk for decompensation otherwise.    Mental Status Exam:   Hygiene:   good  Cooperation:  Cooperative  Eye Contact:  Good  Psychomotor Behavior:  Appropriate  Affect:  Appropriate  Mood: anxious  Speech:  Normal  Thought Process:  Goal directed and Linear  Thought Content:  Normal  Suicidal:  None  Homicidal:  None  Hallucinations:  None  Delusion:  None  Memory:  Intact  Orientation:  Person, Place, Time and Situation  Reliability:  good  Insight:  Good  Judgement:  Good  Impulse Control:  Good  Physical/Medical Issues:  Yes See diagnosis list     PHQ-Score Total:  PHQ-9 Total Score: PHQ-9 Depression Screening  Little interest or pleasure in doing things? 0-->not at all   Feeling down, depressed, or hopeless? 0-->not at all   Trouble falling or staying asleep, or sleeping too much? 1-->several days   Feeling tired or having little energy? 1-->several days   Poor appetite or overeating? 1-->several days   Feeling bad about yourself - or that you are a failure or have let yourself or your family down? 0-->not at all   Trouble concentrating on things, such as reading the newspaper or watching television? 1-->several days   Moving or speaking so slowly that other people could have noticed? Or the opposite - being so fidgety or restless that you have been moving around a lot more than usual? 0-->not at all   Thoughts that you would be better off dead, or of hurting yourself in some way? 0-->not at all   PHQ-9 Total Score 4   If you checked off any problems, how difficult have these problems made it for you to do your work, take care of things at home, or get along with other people? somewhat difficult       MARTINE-7 Total Score:   Over the last two weeks, how often have you been bothered by the following problems?  Feeling nervous, anxious or on edge: Several days  Not being able to stop or control worrying: Several days  Worrying too much about different  things: Several days  Trouble Relaxing: Several days  Being so restless that it is hard to sit still: Not at all  Becoming easily annoyed or irritable: Not at all  Feeling afraid as if something awful might happen: Several days  MARTINE 7 Total Score: 5  If you checked any problems, how difficult have these problems made it for you to do your work, take care of things at home, or get along with other people: Somewhat difficult       Patient's Support Network Includes:      Functional Status: Moderate impairment     Progress toward goal: Not at goal    Prognosis: Good with Ongoing Treatment          Plan     Resources: Patient was provided with the following community resources: None at this time    Patient will continue in individual outpatient therapy with focus on improved functioning and coping skills, maintaining stability, and avoiding decompensation and the need for higher level of care.    Patient will adhere to any medication regimens as prescribed and report any side effects. Patient will contact this office, call 911 or present to the nearest emergency room should suicidal or homicidal ideations occur. Provide cognitive behavioral therapy and solution focused therapy to improve functioning, maintain stability and avoid decompensation and the need for higher level of care.     Return in about 2-4 weeks, or earlier if symptoms worsen or fail to improve.           VISIT DIAGNOSIS:     ICD-10-CM ICD-9-CM   1. Recurrent major depressive disorder, in partial remission (HCC)  F33.41 296.35   2. Generalized anxiety disorder with panic attacks  F41.1 300.02    F41.0 300.01            This document has been electronically signed by NARCISO Woodruff, MARLENE   January 11, 2023 15:15 EST      Part of this note may be an electronic transcription/translation of spoken language to printed text using the Dragon Dictation System.

## 2023-02-03 ENCOUNTER — OFFICE (AMBULATORY)
Dept: URBAN - METROPOLITAN AREA CLINIC 64 | Facility: CLINIC | Age: 48
End: 2023-02-03
Payer: COMMERCIAL

## 2023-02-03 VITALS
SYSTOLIC BLOOD PRESSURE: 103 MMHG | HEIGHT: 69 IN | DIASTOLIC BLOOD PRESSURE: 61 MMHG | HEART RATE: 92 BPM | WEIGHT: 204 LBS

## 2023-02-03 DIAGNOSIS — C20 MALIGNANT NEOPLASM OF RECTUM: ICD-10-CM

## 2023-02-03 DIAGNOSIS — E11.9 TYPE 2 DIABETES MELLITUS WITHOUT COMPLICATIONS: ICD-10-CM

## 2023-02-03 DIAGNOSIS — K75.81 NONALCOHOLIC STEATOHEPATITIS (NASH): ICD-10-CM

## 2023-02-03 DIAGNOSIS — R11.0 NAUSEA: ICD-10-CM

## 2023-02-03 DIAGNOSIS — R18.8 OTHER ASCITES: ICD-10-CM

## 2023-02-03 DIAGNOSIS — Z79.4 LONG TERM (CURRENT) USE OF INSULIN: ICD-10-CM

## 2023-02-03 DIAGNOSIS — F11.90 OPIOID USE, UNSPECIFIED, UNCOMPLICATED: ICD-10-CM

## 2023-02-03 DIAGNOSIS — Z85.048 PERSONAL HISTORY OF OTHER MALIGNANT NEOPLASM OF RECTUM, RECT: ICD-10-CM

## 2023-02-03 PROCEDURE — 99213 OFFICE O/P EST LOW 20 MIN: CPT | Performed by: NURSE PRACTITIONER

## 2023-03-02 NOTE — PROGRESS NOTES
Date: March 3, 2023  Time In: 1104  Time Out: 1149      PROGRESS NOTE  Data:  Yun Mcgowan is a 47 y.o. female who presents today for individual therapy session at [facility] with NARCISO Woodruff LCSW. This provider is located at the [facility] located at [address] [place] [address] The Patient is seen remotely at her home using [employer]. Patient is being seen via telehealth and stated they are in a secure environment for this session. The patient's condition being diagnosed/treated is appropriate for telemedicine. The provider identified herself as well as her credentials. The patient and/or patients guardian consent to be seen remotely, and when consent is given they understand that the consent allows for patient identifiable information to be sent to a third party as needed. They may refuse to be seen remotely at any time. The electronic data is encrypted and password protected, and the patient has been advised of the potential risks to privacy not withstanding such measures.     Patient presents this date for follow-up for depression and anxiety    Clinical Maneuvering/Intervention: Yun is pleasant, alert and oriented to person place and time.  She states that she was terminated from her job 2 days before her surgery.  She has been dealing with coper paperwork as well as long-term disability paperwork and this has been difficult.  She had discussed difficulties with her ileostomy care while in the hospital.  With all of this she has noticed a decrease in her mood and rise in anxiety.  Her  has been very supportive.  She continues using her gratitude journal,  listening to music to help ease anxiety.    (Scales based on 0 - 10 with 10 being the worst)  Depression: 5 Anxiety: 7       Assisted patient in processing above session content; acknowledged and normalized patient’s thoughts, feelings, and concerns. Rationalized patient thought  process regarding normalization of feelings with her recent medical challenges. Discussed triggers associated with patient's anxiety and mood. Also discussed coping skills for patient to implement such as continue with relaxed breathing, guided imagery, gratitude journal and using the imaginary container to put distressing events in.    Allowed patient to freely discuss issues without interruption or judgment. Provided safe, confidential environment to facilitate the development of positive therapeutic relationship and encourage open, honest communication. Assisted patient in identifying risk factors which would indicate the need for higher level of care including thoughts to harm self or others and/or self-harming behavior and encouraged patient to contact this office, call 911, or present to the nearest emergency room should any of these events occur. Discussed crisis intervention services and means to access. Patient adamantly and convincingly denies current suicidal or homicidal ideation or perceptual disturbance.    Assessment   Patient appears to maintain relative stability as compared to their baseline. However, patient continues to struggle with depression and anxiety which continues to cause impairment in important areas of functioning. A result, they can be reasonably expected to continue to benefit from treatment and would likely be at increased risk for decompensation otherwise.    Mental Status Exam:   Hygiene:   good  Cooperation:  Cooperative  Eye Contact:  Good  Psychomotor Behavior:  Appropriate  Affect:  Appropriate  Mood: anxious  Speech:  Normal  Thought Process:  Goal directed and Linear  Thought Content:  Normal  Suicidal:  None  Homicidal:  None  Hallucinations:  None  Delusion:  None  Memory:  Intact  Orientation:  Person, Place, Time and Situation  Reliability:  good  Insight:  Good  Judgement:  Good  Impulse Control:  Good  Physical/Medical Issues:  See diagnosis list       Patient's  Support Network Includes:  significant other    Functional Status: Moderate impairment     Progress toward goal: Not at goal    Prognosis: Good with Ongoing Treatment          Plan     Resources: Patient was provided with the following community resources: None at this time    Patient will continue in individual outpatient therapy with focus on improved functioning and coping skills, maintaining stability, and avoiding decompensation and the need for higher level of care.    Patient will adhere to any medication regimens as prescribed and report any side effects. Patient will contact this office, call 911 or present to the nearest emergency room should suicidal or homicidal ideations occur. Provide cognitive behavioral therapy and solution focused therapy to improve functioning, maintain stability and avoid decompensation and the need for higher level of care.     Return in about 4 weeks, or earlier if symptoms worsen or fail to improve.           VISIT DIAGNOSIS:     ICD-10-CM ICD-9-CM   1. Recurrent major depressive disorder, in partial remission (HCC)  F33.41 296.35   2. Generalized anxiety disorder with panic attacks  F41.1 300.02    F41.0 300.01            This document has been electronically signed by NARCISO Woodruff, MARLENE   March 3, 2023 11:59 EST      Part of this note may be an electronic transcription/translation of spoken language to printed text using the Dragon Dictation System.

## 2023-03-03 ENCOUNTER — TELEMEDICINE (OUTPATIENT)
Dept: PSYCHIATRY | Facility: CLINIC | Age: 48
End: 2023-03-03
Payer: COMMERCIAL

## 2023-03-03 DIAGNOSIS — F41.0 GENERALIZED ANXIETY DISORDER WITH PANIC ATTACKS: Chronic | ICD-10-CM

## 2023-03-03 DIAGNOSIS — F43.22 ADJUSTMENT DISORDER WITH ANXIOUS MOOD: ICD-10-CM

## 2023-03-03 DIAGNOSIS — F33.41 RECURRENT MAJOR DEPRESSIVE DISORDER, IN PARTIAL REMISSION: Chronic | ICD-10-CM

## 2023-03-03 DIAGNOSIS — F41.1 GENERALIZED ANXIETY DISORDER WITH PANIC ATTACKS: Chronic | ICD-10-CM

## 2023-03-03 DIAGNOSIS — F33.41 RECURRENT MAJOR DEPRESSIVE DISORDER, IN PARTIAL REMISSION: Primary | Chronic | ICD-10-CM

## 2023-03-03 PROCEDURE — 90834 PSYTX W PT 45 MINUTES: CPT | Performed by: SOCIAL WORKER

## 2023-03-03 RX ORDER — DESVENLAFAXINE SUCCINATE 50 MG/1
50 TABLET, EXTENDED RELEASE ORAL EVERY MORNING
Qty: 90 TABLET | Refills: 1 | Status: SHIPPED | OUTPATIENT
Start: 2023-03-03 | End: 2023-03-08

## 2023-03-03 NOTE — TELEPHONE ENCOUNTER
Rx Refill Note  Requested Prescriptions     Pending Prescriptions Disp Refills   • desvenlafaxine (Pristiq) 50 MG 24 hr tablet 90 tablet 1     Sig: Take 1 tablet by mouth Every Morning.      Last office visit with prescribing clinician: Visit date not found     Next office visit with prescribing clinician: 3/8/2023     Office Visit with Elliott Balderas PA-C (09/28/2022)      Ping Quan  03/03/23, 09:54 EST

## 2023-03-07 NOTE — PROGRESS NOTES
Subjective   Yun Mcgowan is a 47 y.o. female who presents today for follow-up for psychiatric medication management. Patient is new to this provider, but previously saw OLIVIA العلي.     Chief Complaint:  Depression, anxiety     History of Present Illness:     Patient last saw LITZY Balderas on 9/28/22. At that time she was taking 50mg Pristiq, 300mg Bupropion XL    At today's visit, patient states she has been dealing with cancer and lost job during that. She had surgery a month ago. They were able to get clean margins so she is in remission at this time. She has one more surgery in April to reverse ileostomy.   She feels like she is anxious a lot.   She is seeing Marsha. This is going well.   She was working at [employer]. She had worked there 8 years. And they terminated her because of her cancer treatments. She had maxed out her FMLA time there.   She is tearful talking about her job. She felt like they were supposed to be a supportive, family place and her two supervisors did not even check on her during treatment. This has been hard for her.   She is . Her  is supportive.   No suicidal thought. No HI/AVH.   She isn't sure if her medications are really doing anything for her. We discussed changing Pristiq to Viibryd, which she was agreeable to. Will leave Wellbutrin the same for now.   Will start Viibryd 10mg daily with food. Will increase to 20mg in one month.     Patient presents with symptoms and behaviors that are consistent with the following DSM-5 diagnoses:  1. Major depressive disorder, mod, recurrent  2. Generalized anxiety disorder    The following portions of the patient's history were reviewed and updated as appropriate: allergies, current medications, past family history, past medical history, past social history, past surgical history and problem list.    PAST OUTPATIENT TREATMENT  Diagnosis treated:  Affective Disorder, Anxiety/Panic Disorder  Treatment  Type:  Medication Management  Prior Psychiatric Medications:  Xanax - helpful for panic.  Bupropion XL 300mg  Gabapentin  phenteramine - for wt loss  Promethazine - N/V  topomax 25 bid - migraines  Support Groups:  None  Sequelae Of Mental Disorder:  emotional distress    Interval History  No Change    Side Effects  None      Past Medical History:  Past Medical History:   Diagnosis Date   • Adenocarcinoma of rectum (HCC)    • Adenoma of liver    • Alopecia    • Anxiety    • Diabetes mellitus (HCC)    • Disease of thyroid gland    • GERD (gastroesophageal reflux disease)    • Graves disease    • Hyperlipidemia    • Hypertension    • IBS (irritable bowel syndrome)        Social History:  Social History     Socioeconomic History   • Marital status:    Tobacco Use   • Smoking status: Never   • Smokeless tobacco: Never   Vaping Use   • Vaping Use: Never used   Substance and Sexual Activity   • Alcohol use: Yes     Alcohol/week: 2.0 standard drinks     Types: 2 Drinks containing 0.5 oz of alcohol per week   • Drug use: Yes     Types: Marijuana     Comment: Delta 8 etable once weeekly   • Sexual activity: Defer       Family History:  History reviewed. No pertinent family history.    Past Surgical History:  Past Surgical History:   Procedure Laterality Date   • COLONOSCOPY     • LAPAROSCOPIC OOPHORECTOMY Bilateral        Problem List:  Patient Active Problem List   Diagnosis   • Rectal cancer (HCC)   • Benign neoplasm of liver   • Diabetes mellitus (HCC)   • Gastroesophageal reflux disease   • Hepatic cirrhosis (HCC)   • Hyperlipidemia   • Hypertension   • Lipodystrophy   • Liver mass   • Migraine headache   • Obesity   • Alopecia   • Postablative hypothyroidism   • Seasonal allergic rhinitis   • Premature atrial contraction   • Recurrent major depressive disorder, in partial remission (HCC)   • Generalized anxiety disorder   • Adjustment disorder with anxious mood   • Major depressive disorder, recurrent episode,  moderate (HCC)       Allergy:   Allergies   Allergen Reactions   • Wound Dressing Adhesive Itching     Skin gets itchy and irritated        Discontinued Medications:  Medications Discontinued During This Encounter   Medication Reason   • desvenlafaxine (Pristiq) 50 MG 24 hr tablet *Therapy completed   • diphenoxylate-atropine (LOMOTIL) 2.5-0.025 MG per tablet *Therapy completed       Current Medications:   Current Outpatient Medications   Medication Sig Dispense Refill   • acetaminophen-codeine (TYLENOL with CODEINE #3) 300-30 MG per tablet Take 1 tablet by mouth.     • ALPRAZolam (XANAX) 0.5 MG tablet Take 0.5 mg by mouth 2 (Two) Times a Day As Needed for Anxiety.     • baclofen (LIORESAL) 10 MG tablet Take 10 mg by mouth 3 (Three) Times a Day.     • buPROPion XL (WELLBUTRIN XL) 300 MG 24 hr tablet Take 300 mg by mouth Every Morning.     • Continuous Blood Gluc Sensor (FreeStyle Fang 2 Sensor) misc USE ONE SENSOR EVERY 14 (FOURTEEN) DAYS AS DIRECTED     • cyclobenzaprine (FLEXERIL) 10 MG tablet Take 10 mg by mouth.     • Desvenlafaxine Succinate ER 25 MG tablet sustained-release 24 hour Take 2 tablets by mouth Every Morning for 14 days, THEN 1 tablet Every Morning for 14 days. 42 tablet 0   • dicyclomine (BENTYL) 10 MG capsule Take 10 mg by mouth 4 (Four) Times a Day.     • fexofenadine (ALLEGRA) 60 MG tablet Take 60 mg by mouth Daily.     • hydrocortisone (ANUSOL-HC) 25 MG suppository Insert 1 suppository into the rectum 2 (Two) Times a Day. 24 suppository 1   • ibuprofen (ADVIL,MOTRIN) 800 MG tablet Take  by mouth.     • Insulin Degludec (Tresiba FlexTouch) 200 UNIT/ML solution pen-injector pen injection Inject 80 Units under the skin into the appropriate area as directed Daily.     • Insulin Lispro (humaLOG) 100 UNIT/ML injection Inject 30 Units under the skin into the appropriate area as directed 3 (Three) Times a Day Before Meals.     • lactulose (CHRONULAC) 10 GM/15ML solution TAKE 30 MILLILITER BY ORAL  ROUTE EVERY DAY FOR CONSTIPATION     • lisinopril (PRINIVIL,ZESTRIL) 10 MG tablet Take 10 mg by mouth Daily.     • magnesium gluconate (MAGONATE) 500 MG tablet Take 27 mg by mouth Daily.     • Melatonin 10 MG tablet Take  by mouth.     • Naltrexone HCl (DEPADE PO) Take 4 mg by mouth Every Night.     • nitrofurantoin, macrocrystal-monohydrate, (Macrobid) 100 MG capsule Take 1 capsule by mouth 2 (Two) Times a Day. 14 capsule 0   • ondansetron (ZOFRAN) 4 MG tablet TAKE 1 TABLET BY ORAL ROUTE EVERY 4- 6 HOURS AS NEEDED FOR NAUSEA     • pantoprazole (PROTONIX) 40 MG EC tablet Take 40 mg by mouth Daily.     • promethazine (PHENERGAN) 25 MG tablet TAKE 1 TABLET BY MOUTH EVERY 4 TO 6 HOURS AS NEEDED     • rizatriptan (MAXALT) 10 MG tablet Take 10 mg by mouth 1 (One) Time As Needed for Migraine. May repeat in 2 hours if needed     • Semaglutide, 1 MG/DOSE, (Ozempic, 1 MG/DOSE,) 2 MG/1.5ML solution pen-injector Inject 1 mg under the skin into the appropriate area as directed 1 (One) Time Per Week.     • silver sulfadiazine (SILVADENE, SSD) 1 % cream Apply 1 application topically to the appropriate area as directed 2 (Two) Times a Day. 50 g 2   • topiramate (TOPAMAX) 25 MG capsule (sprinkle) Take 25 mg by mouth 2 (Two) Times a Day.     • vilazodone (Viibryd) 10 MG tablet tablet Take 1 tablet by mouth Daily. 30 tablet 0   • vitamin B-12 (CYANOCOBALAMIN) 1000 MCG tablet Take 1,000 mcg by mouth Daily.     • vitamin D3 125 MCG (5000 UT) capsule capsule Take 5,000 Units by mouth Daily.       No current facility-administered medications for this visit.         Psychological ROS: positive for - anxiety and depression  negative for - behavioral disorder, concentration difficulties, decreased libido, disorientation, hallucinations, hostility, irritability, memory difficulties, mood swings, obsessive thoughts, physical abuse, sexual abuse, sleep disturbances or suicidal ideation      Physical Exam:   There were no vitals taken for this  visit.    Mental Status Exam:   Hygiene:   good  Cooperation:  Cooperative  Eye Contact:  Good  Psychomotor Behavior:  Appropriate  Affect:  Appropriate  Mood: depressed and anxious  Hopelessness: Denies  Speech:  Normal  Thought Process:  Linear  Thought Content:  Normal  Suicidal:  None  Homicidal:  None  Hallucinations:  None  Delusion:  None  Memory:  Intact  Orientation:  Person, Place, Time and Situation  Reliability:  good  Insight:  Good  Judgement:  Good  Impulse Control:  Good  Physical/Medical Issues:  Yes Colorectal cancer        PHQ-9 Depression Screening    Little interest or pleasure in doing things? 0-->not at all   Feeling down, depressed, or hopeless? 1-->several days   Trouble falling or staying asleep, or sleeping too much? 1-->several days   Feeling tired or having little energy? 1-->several days   Poor appetite or overeating? 1-->several days   Feeling bad about yourself - or that you are a failure or have let yourself or your family down? 0-->not at all   Trouble concentrating on things, such as reading the newspaper or watching television? 1-->several days   Moving or speaking so slowly that other people could have noticed? Or the opposite - being so fidgety or restless that you have been moving around a lot more than usual? 0-->not at all   Thoughts that you would be better off dead, or of hurting yourself in some way? 0-->not at all   PHQ-9 Total Score 5   If you checked off any problems, how difficult have these problems made it for you to do your work, take care of things at home, or get along with other people? somewhat difficult        Never smoker    I advised Ondrea of the risks of tobacco use.     Result Review:    Labs:  No visits with results within 3 Month(s) from this visit.   Latest known visit with results is:   Orders Only on 10/31/2022   Component Date Value Ref Range Status   • Course ID 10/31/2022 C1   Final   • Course Start Date 10/31/2022 9/11/2022  8:19 PM   Final   •  Course End Date 10/31/2022 10/31/2022 11:34 AM   Final   • Course First Treatment Date 10/31/2022 9/19/2022  3:29 PM   Final   • Course Last Treatment Date 10/31/2022 10/26/2022  1:53 PM   Final   • Course Elapsed Days 10/31/2022 37   Final   • Reference Point ID 10/31/2022 Pelvis Bst   Final   • Reference Point Dosage Given to Da* 10/31/2022 [id number]  Gy Final   • Reference Point ID 10/31/2022 Pelvis Init   Final   • Reference Point Dosage Given to Da* 10/31/2022 [id number]  Gy Final   • Plan ID 10/31/2022 Pelvis Bst   Final   • Plan Name 10/31/2022 Pelvis Bst   Final   • Plan Fractions Treated to Date 10/31/2022 3   Final   • Plan Total Fractions Prescribed 10/31/2022 3   Final   • Plan Prescribed Dose Per Fraction 10/31/2022 1.8  Gy Final   • Plan Total Prescribed Dose 10/31/2022 540  cGy Final   • Plan Primary Reference Point 10/31/2022 Pelvis Bst   Final   • Plan ID 10/31/2022 Pelvis Init   Final   • Plan Name 10/31/2022 Pelvis Init   Final   • Plan Fractions Treated to Date 10/31/2022 25   Final   • Plan Total Fractions Prescribed 10/31/2022 25   Final   • Plan Prescribed Dose Per Fraction 10/31/2022 1.8  Gy Final   • Plan Total Prescribed Dose 10/31/2022 4,500  cGy Final   • Plan Primary Reference Point 10/31/2022 Pelvis Init   Final       Assessment & Plan   Diagnoses and all orders for this visit:    1. Major depressive disorder, recurrent episode, moderate (HCC) (Primary)  -     Desvenlafaxine Succinate ER 25 MG tablet sustained-release 24 hour; Take 2 tablets by mouth Every Morning for 14 days, THEN 1 tablet Every Morning for 14 days.  Dispense: 42 tablet; Refill: 0  -     vilazodone (Viibryd) 10 MG tablet tablet; Take 1 tablet by mouth Daily.  Dispense: 30 tablet; Refill: 0    2. Generalized anxiety disorder  -     Desvenlafaxine Succinate ER 25 MG tablet sustained-release 24 hour; Take 2 tablets by mouth Every Morning for 14 days, THEN 1 tablet Every Morning for 14 days.  Dispense: 42 tablet;  Refill: 0  -     vilazodone (Viibryd) 10 MG tablet tablet; Take 1 tablet by mouth Daily.  Dispense: 30 tablet; Refill: 0    Continue Wellbutrin 300mg.  Start viibryd 10mg daily with food.   Taper off Pristiq: Take 2, 25mg tablets daily for two weeks, then 1, 25mg tablet daily for two weeks, then stop medication.        Visit Diagnoses:    ICD-10-CM ICD-9-CM   1. Major depressive disorder, recurrent episode, moderate (HCC)  F33.1 296.32   2. Generalized anxiety disorder  F41.1 300.02       TREATMENT PLAN/GOALS: Continue supportive psychotherapy efforts and medications as indicated. Treatment and medication options discussed during today's visit. Patient ackowledged and verbally consented to continue with current treatment plan and was educated on the importance of compliance with treatment and follow-up appointments.    MEDICATION ISSUES:  INSPECT reviewed as expected    Discussed medication options and treatment plan of prescribed medication as well as the risks, benefits, and side effects including potential falls, possible impaired driving and metabolic adversities among others. Patient is agreeable to call the office with any worsening of symptoms or onset of side effects. Patient is agreeable to call 911 or go to the nearest ER should he/she begin having SI/HI. No medication side effects or related complaints today.     MEDS ORDERED DURING VISIT:  New Medications Ordered This Visit   Medications   • Desvenlafaxine Succinate ER 25 MG tablet sustained-release 24 hour     Sig: Take 2 tablets by mouth Every Morning for 14 days, THEN 1 tablet Every Morning for 14 days.     Dispense:  42 tablet     Refill:  0   • vilazodone (Viibryd) 10 MG tablet tablet     Sig: Take 1 tablet by mouth Daily.     Dispense:  30 tablet     Refill:  0       Return in about 2 months (around 5/8/2023).         This document has been electronically signed by DAYANA Lynch  March 8, 2023 12:23 EST    Part of this note may be an  electronic transcription/translation of spoken language to printed text using the Dragon Dictation System.

## 2023-03-08 ENCOUNTER — OFFICE VISIT (OUTPATIENT)
Dept: PSYCHIATRY | Facility: CLINIC | Age: 48
End: 2023-03-08
Payer: COMMERCIAL

## 2023-03-08 DIAGNOSIS — F33.1 MAJOR DEPRESSIVE DISORDER, RECURRENT EPISODE, MODERATE: Primary | Chronic | ICD-10-CM

## 2023-03-08 DIAGNOSIS — F41.1 GENERALIZED ANXIETY DISORDER: Chronic | ICD-10-CM

## 2023-03-08 PROCEDURE — 99214 OFFICE O/P EST MOD 30 MIN: CPT

## 2023-03-08 RX ORDER — VILAZODONE HYDROCHLORIDE 10 MG/1
10 TABLET ORAL DAILY
Qty: 30 TABLET | Refills: 0 | Status: SHIPPED | OUTPATIENT
Start: 2023-03-08 | End: 2023-04-05

## 2023-03-08 RX ORDER — DESVENLAFAXINE 25 MG/1
TABLET, EXTENDED RELEASE ORAL
Qty: 42 TABLET | Refills: 0 | Status: SHIPPED | OUTPATIENT
Start: 2023-03-08 | End: 2023-04-05

## 2023-03-08 NOTE — PATIENT INSTRUCTIONS
Taper Prstiq:  For two weeks, take two, 25mg tablets of Pristiq (total 50mg)  For the next two weeks, take one 25 mg tablet of Pristiq (total 25mg).   After that, stop Pristiq.   At the same time, start Viibryd 10mg daily with food.

## 2023-03-22 NOTE — PROGRESS NOTES
"Date: March 23, 2023  Time In: 0809  Time Out: 0854      PROGRESS NOTE  Data:  Yun Mcgowan is a 47 y.o. female who presents today for individual therapy session at [facility] with NARCISO Woodruff, MARLENE.     Patient Chief Complaint: Follow-up for depression and anxiety    Clinical Maneuvering/Intervention: Yun is pleasant, alert and oriented to person place and time.  She gave verbal permission to begin EMDR.  We began with self stabilization skills.  She created a container that she has named \"the container\" and a peaceful inner place that she has named \"[place]\".  This was tolerated well.  She spoke about the challenges that she has had with insurance, [employer] and her past employer.    Assisted patient in processing above session content; acknowledged and normalized patient’s thoughts, feelings, and concerns. Rationalized patient thought process regarding medical trauma. Discussed triggers associated with patient's anxiety. Also discussed coping skills for patient to implement such as self stabilization skills.    Allowed patient to freely discuss issues without interruption or judgment. Provided safe, confidential environment to facilitate the development of positive therapeutic relationship and encourage open, honest communication. Assisted patient in identifying risk factors which would indicate the need for higher level of care including thoughts to harm self or others and/or self-harming behavior and encouraged patient to contact this office, call 911, or present to the nearest emergency room should any of these events occur. Discussed crisis intervention services and means to access. Patient adamantly and convincingly denies current suicidal or homicidal ideation or perceptual disturbance.    Assessment   Patient appears to maintain relative stability as compared to their baseline. However, patient continues to struggle with anxiety and depression which continues to cause " impairment in important areas of functioning. A result, they can be reasonably expected to continue to benefit from treatment and would likely be at increased risk for decompensation otherwise.    Mental Status Exam:   Hygiene:   good  Cooperation:  Cooperative  Eye Contact:  Good  Psychomotor Behavior:  Appropriate  Affect:  Appropriate  Mood: anxious  Speech:  Normal  Thought Process:  Goal directed and Linear  Thought Content:  Normal  Suicidal:  None  Homicidal:  None  Hallucinations:  None  Delusion:  None  Memory:  Intact  Orientation:  Person, Place, Time and Situation  Reliability:  good  Insight:  Good  Judgement:  Good  Impulse Control:  Good  Physical/Medical Issues:  See diagnosis list     PHQ-Score Total:  PHQ-9 Total Score: PHQ-9 Depression Screening  Little interest or pleasure in doing things? 1-->several days   Feeling down, depressed, or hopeless? 1-->several days   Trouble falling or staying asleep, or sleeping too much? 1-->several days   Feeling tired or having little energy? 1-->several days   Poor appetite or overeating? 1-->several days   Feeling bad about yourself - or that you are a failure or have let yourself or your family down? 0-->not at all   Trouble concentrating on things, such as reading the newspaper or watching television? 0-->not at all   Moving or speaking so slowly that other people could have noticed? Or the opposite - being so fidgety or restless that you have been moving around a lot more than usual? 0-->not at all   Thoughts that you would be better off dead, or of hurting yourself in some way? 0-->not at all   PHQ-9 Total Score 5   If you checked off any problems, how difficult have these problems made it for you to do your work, take care of things at home, or get along with other people?        MARTINE-7 Total Score:   Over the last two weeks, how often have you been bothered by the following problems?  Feeling nervous, anxious or on edge: Several days  Not being able to  stop or control worrying: Several days  Worrying too much about different things: Several days  Trouble Relaxing: Several days  Being so restless that it is hard to sit still: Not at all  Becoming easily annoyed or irritable: Not at all  Feeling afraid as if something awful might happen: Not at all  MARTINE 7 Total Score: 4     Patient's Support Network Includes:  significant other    Functional Status: Moderate impairment     Progress toward goal: Not at goal    Prognosis: Good with Ongoing Treatment          Plan     Resources: Patient was provided with the following community resources: None at this time    Patient will continue in individual outpatient therapy with focus on improved functioning and coping skills, maintaining stability, and avoiding decompensation and the need for higher level of care.    Patient will adhere to any medication regimens as prescribed and report any side effects. Patient will contact this office, call 911 or present to the nearest emergency room should suicidal or homicidal ideations occur. Provide cognitive behavioral therapy and solution focused therapy to improve functioning, maintain stability and avoid decompensation and the need for higher level of care.     Return in about 4 weeks, or earlier if symptoms worsen or fail to improve.           VISIT DIAGNOSIS:     ICD-10-CM ICD-9-CM   1. Adjustment disorder with anxious mood  F43.22 309.24            This document has been electronically signed by NARCISO Woodruff, MARLENE   March 23, 2023 08:56 EDT      Part of this note may be an electronic transcription/translation of spoken language to printed text using the Dragon Dictation System.

## 2023-03-23 ENCOUNTER — OFFICE VISIT (OUTPATIENT)
Dept: PSYCHIATRY | Facility: CLINIC | Age: 48
End: 2023-03-23
Payer: COMMERCIAL

## 2023-03-23 DIAGNOSIS — F43.22 ADJUSTMENT DISORDER WITH ANXIOUS MOOD: Primary | ICD-10-CM

## 2023-03-23 PROCEDURE — 90834 PSYTX W PT 45 MINUTES: CPT | Performed by: SOCIAL WORKER

## 2023-03-29 DIAGNOSIS — F33.1 MAJOR DEPRESSIVE DISORDER, RECURRENT EPISODE, MODERATE: Chronic | ICD-10-CM

## 2023-03-29 DIAGNOSIS — F41.1 GENERALIZED ANXIETY DISORDER: Chronic | ICD-10-CM

## 2023-03-30 RX ORDER — DESVENLAFAXINE 25 MG/1
TABLET, EXTENDED RELEASE ORAL
Qty: 42 TABLET | Refills: 0 | OUTPATIENT
Start: 2023-03-30 | End: 2023-04-27

## 2023-04-05 DIAGNOSIS — F33.1 MAJOR DEPRESSIVE DISORDER, RECURRENT EPISODE, MODERATE: Chronic | ICD-10-CM

## 2023-04-05 DIAGNOSIS — F41.1 GENERALIZED ANXIETY DISORDER: Chronic | ICD-10-CM

## 2023-04-05 RX ORDER — VILAZODONE HYDROCHLORIDE 10 MG/1
TABLET ORAL
Qty: 30 TABLET | Refills: 0 | Status: SHIPPED | OUTPATIENT
Start: 2023-04-05

## 2023-04-11 ENCOUNTER — OFFICE (AMBULATORY)
Dept: URBAN - METROPOLITAN AREA CLINIC 64 | Facility: CLINIC | Age: 48
End: 2023-04-11
Payer: COMMERCIAL

## 2023-04-11 VITALS
DIASTOLIC BLOOD PRESSURE: 64 MMHG | SYSTOLIC BLOOD PRESSURE: 116 MMHG | HEART RATE: 96 BPM | HEIGHT: 69 IN | WEIGHT: 205 LBS

## 2023-04-11 DIAGNOSIS — R16.0 HEPATOMEGALY, NOT ELSEWHERE CLASSIFIED: ICD-10-CM

## 2023-04-11 DIAGNOSIS — K75.81 NONALCOHOLIC STEATOHEPATITIS (NASH): ICD-10-CM

## 2023-04-11 DIAGNOSIS — R18.8 OTHER ASCITES: ICD-10-CM

## 2023-04-11 DIAGNOSIS — C20 MALIGNANT NEOPLASM OF RECTUM: ICD-10-CM

## 2023-04-11 PROCEDURE — 99214 OFFICE O/P EST MOD 30 MIN: CPT | Performed by: INTERNAL MEDICINE

## 2023-04-19 ENCOUNTER — OFFICE VISIT (OUTPATIENT)
Dept: PSYCHIATRY | Facility: CLINIC | Age: 48
End: 2023-04-19
Payer: COMMERCIAL

## 2023-04-19 DIAGNOSIS — F33.1 MAJOR DEPRESSIVE DISORDER, RECURRENT EPISODE, MODERATE: Primary | Chronic | ICD-10-CM

## 2023-04-19 DIAGNOSIS — F41.1 GENERALIZED ANXIETY DISORDER: ICD-10-CM

## 2023-04-19 NOTE — PROGRESS NOTES
"Date: April 19, 2023  Time In: 1310  Time Out: 1400      PROGRESS NOTE  Data:  Yun Mcgowan is a 48 y.o. female who presents today for individual therapy session at [facility] with NARCISO Woodruff, MARLENE.     Patient Chief Complaint: Follow-up for depression and anxiety    Clinical Maneuvering/Intervention: Yun is pleasant, alert and oriented to person place and time.  She gave verbal permission to continue with EMDR targeted sequence planning regarding the medical traumas that she has been through.  She identified core belief \"I feel I am being punished for the bad things I have done\" this was changed to a more adaptive belief \"bad things happen to good people and I am worthy of good things\".  She also discussed at length about the relationship that she had with her biological father.  She did not see him very often and when she did he was very degrading and insulting to his mother.  However he had treated half-brothers differently.  Feelings that she identified when talking about her relationship with her father were \"degraded, angry, and sad\".  We talked about how shame may have affect on what she is feeling as well.    Assisted patient in processing above session content; acknowledged and normalized patient’s thoughts, feelings, and concerns. Rationalized patient thought process regarding belongingness and other people's maladaptive behavior. Discussed triggers associated with patient's depression. Also discussed coping skills for patient to implement such as continuing the skills already built.    Allowed patient to freely discuss issues without interruption or judgment. Provided safe, confidential environment to facilitate the development of positive therapeutic relationship and encourage open, honest communication. Assisted patient in identifying risk factors which would indicate the need for higher level of care including thoughts to harm self or others and/or self-harming " behavior and encouraged patient to contact this office, call 911, or present to the nearest emergency room should any of these events occur. Discussed crisis intervention services and means to access. Patient adamantly and convincingly denies current suicidal or homicidal ideation or perceptual disturbance.    Assessment   Patient appears to maintain relative stability as compared to their baseline. However, patient continues to struggle with depression and anxiety which continues to cause impairment in important areas of functioning. A result, they can be reasonably expected to continue to benefit from treatment and would likely be at increased risk for decompensation otherwise.    Mental Status Exam:   Hygiene:   good  Cooperation:  Cooperative  Eye Contact:  Good  Psychomotor Behavior:  Appropriate  Affect:  Appropriate  Mood: anxious  Speech:  Normal  Thought Process:  Goal directed and Linear  Thought Content:  Normal  Suicidal:  None  Homicidal:  None  Hallucinations:  None  Delusion:  None  Memory:  Intact  Orientation:  Person, Place, Time and Situation  Reliability:  good  Insight:  Good  Judgement:  Good  Impulse Control:  Good  Physical/Medical Issues:  See diagnosis list     PHQ-Score Total:  PHQ-9 Total Score: PHQ-9 Depression Screening  Little interest or pleasure in doing things? 1-->several days   Feeling down, depressed, or hopeless? 1-->several days   Trouble falling or staying asleep, or sleeping too much? 1-->several days   Feeling tired or having little energy? 1-->several days   Poor appetite or overeating? 0-->not at all   Feeling bad about yourself - or that you are a failure or have let yourself or your family down? 0-->not at all   Trouble concentrating on things, such as reading the newspaper or watching television? 0-->not at all   Moving or speaking so slowly that other people could have noticed? Or the opposite - being so fidgety or restless that you have been moving around a lot more  than usual? 0-->not at all   Thoughts that you would be better off dead, or of hurting yourself in some way? 0-->not at all   PHQ-9 Total Score 4   If you checked off any problems, how difficult have these problems made it for you to do your work, take care of things at home, or get along with other people?        MARTINE-7 Total Score:   Over the last two weeks, how often have you been bothered by the following problems?  Feeling nervous, anxious or on edge: Several days  Not being able to stop or control worrying: Several days  Worrying too much about different things: Several days  Trouble Relaxing: Several days  Being so restless that it is hard to sit still: Several days  Becoming easily annoyed or irritable: Several days  Feeling afraid as if something awful might happen: Not at all  MARTINE 7 Total Score: 6     Patient's Support Network Includes:  significant other    Functional Status: Moderate impairment     Progress toward goal: Not at goal    Prognosis: Good with Ongoing Treatment          Plan     Resources: Patient was provided with the following community resources: None at this time    Patient will continue in individual outpatient therapy with focus on improved functioning and coping skills, maintaining stability, and avoiding decompensation and the need for higher level of care.    Patient will adhere to any medication regimens as prescribed and report any side effects. Patient will contact this office, call 911 or present to the nearest emergency room should suicidal or homicidal ideations occur. Provide cognitive behavioral therapy and solution focused therapy to improve functioning, maintain stability and avoid decompensation and the need for higher level of care.     Return at first available, or earlier if symptoms worsen or fail to improve.           VISIT DIAGNOSIS:     ICD-10-CM ICD-9-CM   1. Major depressive disorder, recurrent episode, moderate  F33.1 296.32   2. Generalized anxiety disorder   F41.1 300.02            This document has been electronically signed by NARCISO Woodruff, CHERIEW   April 19, 2023 16:33 EDT      Part of this note may be an electronic transcription/translation of spoken language to printed text using the Dragon Dictation System.

## 2023-05-02 NOTE — PROGRESS NOTES
"Date: May 3, 2023  Time In: 0806  Time Out: 0905      PROGRESS NOTE  Data:  Yun Mcgowan is a 48 y.o. female who presents today for individual therapy session at [facility] with NARCISO Woodruff, MARLENE.     Patient Chief Complaint: Follow-up for depression and anxiety    Clinical Maneuvering/Intervention: Yun is pleasant, alert and oriented to person place and time.  She gave verbal permission to continue with EMDR regarding the medical trauma and work related difficulty that she had during her illness.  Her adaptive belief was \"good people make mistakes and bad things happen to good people and I am worthy of good things\".  She started with a set of 10 and ended with a JASON evidence 0.  She started with a VOC of 7 and this was unchanged.  Her body scan she noted tension in her shoulders.  This was well tolerated.     She spoke about confrontations that she has with her  and in their business.  We talked about different communication techniques that may be helpful for them.    Assisted patient in processing above session content; acknowledged and normalized patient’s thoughts, feelings, and concerns. Rationalized patient thought process regarding communication during confrontation. Discussed triggers associated with patient's mood and anxiety. Also discussed coping skills for patient to implement such as continuing the skills already built.    Allowed patient to freely discuss issues without interruption or judgment. Provided safe, confidential environment to facilitate the development of positive therapeutic relationship and encourage open, honest communication. Assisted patient in identifying risk factors which would indicate the need for higher level of care including thoughts to harm self or others and/or self-harming behavior and encouraged patient to contact this office, call 911, or present to the nearest emergency room should any of these events occur. Discussed crisis " intervention services and means to access. Patient adamantly and convincingly denies current suicidal or homicidal ideation or perceptual disturbance.    Assessment   Patient appears to maintain relative stability as compared to their baseline. However, patient continues to struggle with depression and anxiety which continues to cause impairment in important areas of functioning. A result, they can be reasonably expected to continue to benefit from treatment and would likely be at increased risk for decompensation otherwise.    Mental Status Exam:   Hygiene:   good  Cooperation:  Cooperative  Eye Contact:  Good  Psychomotor Behavior:  Appropriate  Affect:  Appropriate  Mood: Normal  Speech:  Normal  Thought Process:  Goal directed and Linear  Thought Content:  Normal  Suicidal:  None  Homicidal:  None  Hallucinations:  None  Delusion:  None  Memory:  Intact  Orientation:  Person, Place, Time and Situation  Reliability:  good  Insight:  Good  Judgement:  Good  Impulse Control:  Good  Physical/Medical Issues:  See diagnosis list        PHQ-Score Total:  PHQ-9 Total Score: PHQ-9 Depression Screening  Little interest or pleasure in doing things? 0-->not at all   Feeling down, depressed, or hopeless? 0-->not at all   Trouble falling or staying asleep, or sleeping too much? 1-->several days   Feeling tired or having little energy? 1-->several days   Poor appetite or overeating? 1-->several days   Feeling bad about yourself - or that you are a failure or have let yourself or your family down? 0-->not at all   Trouble concentrating on things, such as reading the newspaper or watching television? 1-->several days   Moving or speaking so slowly that other people could have noticed? Or the opposite - being so fidgety or restless that you have been moving around a lot more than usual? 0-->not at all   Thoughts that you would be better off dead, or of hurting yourself in some way? 0-->not at all   PHQ-9 Total Score 4   If you  checked off any problems, how difficult have these problems made it for you to do your work, take care of things at home, or get along with other people?        MARTINE-7 Total Score:   Over the last two weeks, how often have you been bothered by the following problems?  Feeling nervous, anxious or on edge: Several days  Not being able to stop or control worrying: Not at all  Worrying too much about different things: Several days  Trouble Relaxing: Several days  Being so restless that it is hard to sit still: Not at all  Becoming easily annoyed or irritable: Several days  Feeling afraid as if something awful might happen: Not at all  MARTINE 7 Total Score: 4     Patient's Support Network Includes:  significant other     Functional Status: Moderate impairment      Progress toward goal: Not at goal     Prognosis: Good with Ongoing Treatment        Plan     Resources: Patient was provided with the following community resources: None at this time    Patient will continue in individual outpatient therapy with focus on improved functioning and coping skills, maintaining stability, and avoiding decompensation and the need for higher level of care.    Patient will adhere to any medication regimens as prescribed and report any side effects. Patient will contact this office, call 911 or present to the nearest emergency room should suicidal or homicidal ideations occur. Provide cognitive behavioral therapy and solution focused therapy to improve functioning, maintain stability and avoid decompensation and the need for higher level of care.     Return in about 4 to 6 weeks weeks, or earlier if symptoms worsen or fail to improve.           VISIT DIAGNOSIS:     ICD-10-CM ICD-9-CM   1. Major depressive disorder, recurrent episode, moderate  F33.1 296.32   2. Generalized anxiety disorder  F41.1 300.02            This document has been electronically signed by NARCISO Woodruff, MARLENE   May 3, 2023 09:14 EDT      Part of this note may be an  electronic transcription/translation of spoken language to printed text using the Dragon Dictation System.

## 2023-05-03 ENCOUNTER — OFFICE VISIT (OUTPATIENT)
Dept: PSYCHIATRY | Facility: CLINIC | Age: 48
End: 2023-05-03
Payer: COMMERCIAL

## 2023-05-03 DIAGNOSIS — F41.1 GENERALIZED ANXIETY DISORDER: ICD-10-CM

## 2023-05-03 DIAGNOSIS — F33.1 MAJOR DEPRESSIVE DISORDER, RECURRENT EPISODE, MODERATE: Primary | Chronic | ICD-10-CM

## 2023-05-05 RX ORDER — LEVOTHYROXINE, LIOTHYRONINE 76; 18 UG/1; UG/1
1 TABLET ORAL DAILY
COMMUNITY
Start: 2023-04-24

## 2023-05-05 RX ORDER — BETAMETHASONE DIPROPIONATE 0.5 MG/G
CREAM TOPICAL
COMMUNITY
Start: 2023-04-02

## 2023-05-05 RX ORDER — POLYETHYLENE GLYCOL 3350 17 G/17G
POWDER, FOR SOLUTION ORAL
COMMUNITY
Start: 2023-01-06 | End: 2023-05-09

## 2023-05-05 RX ORDER — DICLOFENAC POTASSIUM 50 MG/1
50 TABLET, FILM COATED ORAL
COMMUNITY
Start: 2023-01-06

## 2023-05-05 RX ORDER — FUROSEMIDE 20 MG/1
1 TABLET ORAL DAILY
COMMUNITY
Start: 2023-04-05 | End: 2023-05-09

## 2023-05-05 RX ORDER — TRAMADOL HYDROCHLORIDE 50 MG/1
TABLET ORAL
COMMUNITY
Start: 2023-02-14 | End: 2023-05-09

## 2023-05-05 RX ORDER — HYDROXYZINE 50 MG/1
TABLET, FILM COATED ORAL
COMMUNITY
Start: 2023-03-16

## 2023-05-05 RX ORDER — METRONIDAZOLE 500 MG/1
TABLET ORAL
COMMUNITY
Start: 2022-11-30 | End: 2023-05-09

## 2023-05-05 RX ORDER — SPIRONOLACTONE 25 MG/1
1 TABLET ORAL DAILY
COMMUNITY
Start: 2023-04-05

## 2023-05-05 RX ORDER — VITAMIN E 268 MG
180 CAPSULE ORAL
COMMUNITY
Start: 2023-04-03

## 2023-05-05 RX ORDER — SEMAGLUTIDE 1.34 MG/ML
INJECTION, SOLUTION SUBCUTANEOUS
COMMUNITY
Start: 2023-05-01 | End: 2023-05-09

## 2023-05-05 RX ORDER — GABAPENTIN 300 MG/1
CAPSULE ORAL
COMMUNITY
Start: 2023-04-06

## 2023-05-05 RX ORDER — LOPERAMIDE HYDROCHLORIDE 2 MG/1
1 CAPSULE ORAL 3 TIMES DAILY
COMMUNITY
Start: 2023-02-23

## 2023-05-05 RX ORDER — INSULIN LISPRO 200 [IU]/ML
INJECTION, SOLUTION SUBCUTANEOUS
COMMUNITY
Start: 2023-04-30

## 2023-05-07 DIAGNOSIS — F41.1 GENERALIZED ANXIETY DISORDER: Chronic | ICD-10-CM

## 2023-05-07 DIAGNOSIS — F33.1 MAJOR DEPRESSIVE DISORDER, RECURRENT EPISODE, MODERATE: Chronic | ICD-10-CM

## 2023-05-07 NOTE — PROGRESS NOTES
Subjective   Yun Mcgowan is a 48 y.o. female who presents today for follow-up for psychiatric medication management.     Chief Complaint:  Depression, anxiety     History of Present Illness:     Medication adjustments last visit:    At today's visit, patient states she is still taking the Wellbutrin and the Viibryd.   She just had surgery to reverse ileostomy.  Last time, we tapered off the Pristiq and started Viibryd.   She is still feeling anxious. Her thyroid was a little off at her last endocrinology appointment.   She takes alprazolam only for MRI. She is claustrophobic. She doesn't take this currently or regularly.   No suicidal thoughts. No HI/AVH  She is on long term disability right now. She is still trying to heal.   She is taking some online classes.   She is , no kids.    They cannot make their bills on their 's income. This has her worried about whether the long term disability will stop.   She always has anxiety about whether her cancer is going to come back or not.   She has another appointment with Marsha on June 14th.    We discussed increasing Viibryd. Will decrease Wellbutrin next visit, and taper off it.     Patient presents with symptoms and behaviors that are consistent with the following DSM-5 diagnoses:  1. Major depressive disorder, mod, recurrent  2. Generalized anxiety disorder    The following portions of the patient's history were reviewed and updated as appropriate: allergies, current medications, past family history, past medical history, past social history, past surgical history and problem list.    PAST OUTPATIENT TREATMENT  Diagnosis treated:  Affective Disorder, Anxiety/Panic Disorder  Treatment Type:  Medication Management  Prior Psychiatric Medications:  Xanax - helpful for panic.  Bupropion XL 300mg  Gabapentin  phenteramine - for wt loss  Promethazine - N/V  topomax 25 bid - migraines  Support Groups:  None  Sequelae Of Mental Disorder:  emotional  distress    Interval History  No Change    Side Effects  None      Past Medical History:  Past Medical History:   Diagnosis Date   • Adenocarcinoma of rectum    • Adenoma of liver    • Alopecia    • Anxiety    • Diabetes mellitus    • Disease of thyroid gland    • GERD (gastroesophageal reflux disease)    • Graves disease    • Hyperlipidemia    • Hypertension    • IBS (irritable bowel syndrome)        Social History:  Social History     Socioeconomic History   • Marital status:    Tobacco Use   • Smoking status: Never   • Smokeless tobacco: Never   Vaping Use   • Vaping Use: Never used   Substance and Sexual Activity   • Alcohol use: Yes     Alcohol/week: 2.0 standard drinks     Types: 2 Drinks containing 0.5 oz of alcohol per week   • Drug use: Yes     Types: Marijuana     Comment: Delta 8 etable once weeekly   • Sexual activity: Defer       Family History:  History reviewed. No pertinent family history.    Past Surgical History:  Past Surgical History:   Procedure Laterality Date   • COLONOSCOPY     • LAPAROSCOPIC OOPHORECTOMY Bilateral        Problem List:  Patient Active Problem List   Diagnosis   • Rectal cancer   • Benign neoplasm of liver   • Diabetes mellitus   • Gastroesophageal reflux disease   • Hepatic cirrhosis   • Hyperlipidemia   • Hypertension   • Lipodystrophy   • Liver mass   • Migraine headache   • Obesity   • Alopecia   • Postablative hypothyroidism   • Seasonal allergic rhinitis   • Premature atrial contraction   • Recurrent major depressive disorder, in partial remission   • Generalized anxiety disorder   • Adjustment disorder with anxious mood   • Major depressive disorder, recurrent episode, moderate       Allergy:   Allergies   Allergen Reactions   • Wound Dressing Adhesive Itching     Skin gets itchy and irritated        Discontinued Medications:  Medications Discontinued During This Encounter   Medication Reason   • Desvenlafaxine Succinate ER 25 MG tablet sustained-release 24 hour  *Therapy completed   • Insulin Lispro (humaLOG) 100 UNIT/ML injection *Therapy completed   • Semaglutide, 1 MG/DOSE, (Ozempic, 1 MG/DOSE,) 2 MG/1.5ML solution pen-injector *Therapy completed   • acetaminophen-codeine (TYLENOL with CODEINE #3) 300-30 MG per tablet    • ALPRAZolam (XANAX) 0.5 MG tablet    • baclofen (LIORESAL) 10 MG tablet    • cyclobenzaprine (FLEXERIL) 10 MG tablet    • furosemide (LASIX) 20 MG tablet    • hydrocortisone (ANUSOL-HC) 25 MG suppository    • lactulose (CHRONULAC) 10 GM/15ML solution    • metroNIDAZOLE (FLAGYL) 500 MG tablet    • ondansetron (ZOFRAN) 4 MG tablet    • Ozempic, 1 MG/DOSE, 4 MG/3ML solution pen-injector    • polyethylene glycol (MIRALAX) 17 GM/SCOOP powder    • traMADol (ULTRAM) 50 MG tablet    • vilazodone (VIIBRYD) 10 MG tablet tablet        Current Medications:   Current Outpatient Medications   Medication Sig Dispense Refill   • AZO CRANBERRY GUMMIES PO   2 tabs, Oral, Daily, 0 Refill(s)     • buPROPion XL (WELLBUTRIN XL) 300 MG 24 hr tablet Take 1 tablet by mouth Every Morning.     • Continuous Blood Gluc Sensor (FreeStyle Fang 2 Sensor) misc USE ONE SENSOR EVERY 14 (FOURTEEN) DAYS AS DIRECTED     • fexofenadine (ALLEGRA) 60 MG tablet Take 1 tablet by mouth Daily.     • gabapentin (NEURONTIN) 300 MG capsule TAKE 1 CAPSULE BY MOUTH AT BEDTIME FOR 3 DAYS ,THEN TWICE DAILY FOR 3 DAYS, THEN THREE TIMES A DAY     • HumaLOG KwikPen 200 UNIT/ML solution pen-injector INJECT 70 UNITS UNDER THE SKIN 3 (THREE) TIMES A DAY WITH MEALS.     • hydrOXYzine (ATARAX) 50 MG tablet TAKE 1-2 TABLETS BY MOUTH DAILY AS NEEDED FOR ANXIETY     • ibuprofen (ADVIL,MOTRIN) 800 MG tablet Take  by mouth.     • Insulin Degludec (Tresiba FlexTouch) 200 UNIT/ML solution pen-injector pen injection Inject 80 Units under the skin into the appropriate area as directed Daily.     • lisinopril (PRINIVIL,ZESTRIL) 10 MG tablet Take 1 tablet by mouth Daily.     • loperamide (IMODIUM) 2 MG capsule Take 1  capsule by mouth 3 (Three) Times a Day.     • magnesium gluconate (MAGONATE) 500 MG tablet Take 1 tablet by mouth Daily.     • Melatonin 10 MG tablet Take  by mouth.     • Naltrexone HCl (DEPADE PO) Take 4 mg by mouth Every Night.     • nitrofurantoin, macrocrystal-monohydrate, (Macrobid) 100 MG capsule Take 1 capsule by mouth 2 (Two) Times a Day. 14 capsule 0   • NP Thyroid 120 MG tablet Take 1 tablet by mouth Daily.     • pantoprazole (PROTONIX) 40 MG EC tablet Take 1 tablet by mouth Daily.     • promethazine (PHENERGAN) 25 MG tablet TAKE 1 TABLET BY MOUTH EVERY 4 TO 6 HOURS AS NEEDED     • rizatriptan (MAXALT) 10 MG tablet Take 1 tablet by mouth 1 (One) Time As Needed for Migraine. May repeat in 2 hours if needed     • silver sulfadiazine (SILVADENE, SSD) 1 % cream Apply 1 application topically to the appropriate area as directed 2 (Two) Times a Day. 50 g 2   • topiramate (TOPAMAX) 25 MG capsule (sprinkle) Take 1 capsule by mouth 2 (Two) Times a Day.     • vilazodone (VIIBRYD) 20 MG tablet tablet Take 1 tablet by mouth Daily. 30 tablet 4   • vitamin B-12 (CYANOCOBALAMIN) 1000 MCG tablet Take 1 tablet by mouth Daily.     • vitamin D3 125 MCG (5000 UT) capsule capsule Take 1 capsule by mouth Daily.     • vitamin E 400 UNIT capsule 1 capsule.     • betamethasone dipropionate 0.05 % cream APPLY TO RIGHT WRIST 4 TO 5 NIGHTS A WEEK AS NEEDED ACTIVE AREAS     • diclofenac (CATAFLAM) 50 MG tablet 1 tablet. (Patient not taking: Reported on 5/9/2023)     • spironolactone (ALDACTONE) 25 MG tablet Take 1 tablet by mouth Daily. (Patient not taking: Reported on 5/9/2023)       No current facility-administered medications for this visit.         Psychological ROS: positive for - anxiety and depression  negative for - behavioral disorder, concentration difficulties, decreased libido, disorientation, hallucinations, hostility, irritability, memory difficulties, mood swings, obsessive thoughts, physical abuse, sexual abuse, sleep  disturbances or suicidal ideation      Physical Exam:   There were no vitals taken for this visit.    Mental Status Exam:   Hygiene:   good  Cooperation:  Cooperative  Eye Contact:  Good  Psychomotor Behavior:  Appropriate  Affect:  Appropriate  Mood: depressed and anxious  Hopelessness: Denies  Speech:  Normal  Thought Process:  Linear  Thought Content:  Normal  Suicidal:  None  Homicidal:  None  Hallucinations:  None  Delusion:  None  Memory:  Intact  Orientation:  Person, Place, Time and Situation  Reliability:  good  Insight:  Good  Judgement:  Good  Impulse Control:  Good  Physical/Medical Issues:  Yes Colorectal cancer      Mental status exam was reviewed and compared to visit on 3/8/23 and appropriate updates were made.     PHQ-9 Depression Screening    Little interest or pleasure in doing things? 0-->not at all   Feeling down, depressed, or hopeless? 0-->not at all   Trouble falling or staying asleep, or sleeping too much? 1-->several days   Feeling tired or having little energy? 1-->several days   Poor appetite or overeating? 1-->several days   Feeling bad about yourself - or that you are a failure or have let yourself or your family down? 0-->not at all   Trouble concentrating on things, such as reading the newspaper or watching television? 1-->several days   Moving or speaking so slowly that other people could have noticed? Or the opposite - being so fidgety or restless that you have been moving around a lot more than usual? 0-->not at all   Thoughts that you would be better off dead, or of hurting yourself in some way? 0-->not at all   PHQ-9 Total Score 4   If you checked off any problems, how difficult have these problems made it for you to do your work, take care of things at home, or get along with other people? somewhat difficult        Never smoker    I advised Ondrea of the risks of tobacco use.     Result Review:    Labs:  No visits with results within 3 Month(s) from this visit.   Latest known  visit with results is:   Orders Only on 10/31/2022   Component Date Value Ref Range Status   • Course ID 10/31/2022 C1   Final   • Course Start Date 10/31/2022 9/11/2022  8:19 PM   Final   • Course End Date 10/31/2022 10/31/2022 11:34 AM   Final   • Course First Treatment Date 10/31/2022 9/19/2022  3:29 PM   Final   • Course Last Treatment Date 10/31/2022 10/26/2022  1:53 PM   Final   • Course Elapsed Days 10/31/2022 37   Final   • Reference Point ID 10/31/2022 Pelvis Bst   Final   • Reference Point Dosage Given to Da* 10/31/2022 [id number]  Gy Final   • Reference Point ID 10/31/2022 Pelvis Init   Final   • Reference Point Dosage Given to Da* 10/31/2022 [id number]  Gy Final   • Plan ID 10/31/2022 Pelvis Bst   Final   • Plan Name 10/31/2022 Pelvis Bst   Final   • Plan Fractions Treated to Date 10/31/2022 3   Final   • Plan Total Fractions Prescribed 10/31/2022 3   Final   • Plan Prescribed Dose Per Fraction 10/31/2022 1.8  Gy Final   • Plan Total Prescribed Dose 10/31/2022 540  cGy Final   • Plan Primary Reference Point 10/31/2022 Pelvis Bst   Final   • Plan ID 10/31/2022 Pelvis Init   Final   • Plan Name 10/31/2022 Pelvis Init   Final   • Plan Fractions Treated to Date 10/31/2022 25   Final   • Plan Total Fractions Prescribed 10/31/2022 25   Final   • Plan Prescribed Dose Per Fraction 10/31/2022 1.8  Gy Final   • Plan Total Prescribed Dose 10/31/2022 4,500  cGy Final   • Plan Primary Reference Point 10/31/2022 Pelvis Init   Final       Assessment & Plan   Diagnoses and all orders for this visit:    1. Major depressive disorder, recurrent episode, moderate  -     vilazodone (VIIBRYD) 20 MG tablet tablet; Take 1 tablet by mouth Daily.  Dispense: 30 tablet; Refill: 4    2. Generalized anxiety disorder  -     vilazodone (VIIBRYD) 20 MG tablet tablet; Take 1 tablet by mouth Daily.  Dispense: 30 tablet; Refill: 4         Continue Wellbutrin 300mg.  Continue Viibryd, increase to 20mg.        Visit Diagnoses:     ICD-10-CM ICD-9-CM   1. Major depressive disorder, recurrent episode, moderate  F33.1 296.32   2. Generalized anxiety disorder  F41.1 300.02       TREATMENT PLAN/GOALS: Continue supportive psychotherapy efforts and medications as indicated. Treatment and medication options discussed during today's visit. Patient ackowledged and verbally consented to continue with current treatment plan and was educated on the importance of compliance with treatment and follow-up appointments.    MEDICATION ISSUES:  INSPECT reviewed as expected    Discussed medication options and treatment plan of prescribed medication as well as the risks, benefits, and side effects including potential falls, possible impaired driving and metabolic adversities among others. Patient is agreeable to call the office with any worsening of symptoms or onset of side effects. Patient is agreeable to call 911 or go to the nearest ER should he/she begin having SI/HI. No medication side effects or related complaints today.     MEDS ORDERED DURING VISIT:  New Medications Ordered This Visit   Medications   • vilazodone (VIIBRYD) 20 MG tablet tablet     Sig: Take 1 tablet by mouth Daily.     Dispense:  30 tablet     Refill:  4       Return in about 3 months (around 8/9/2023).         This document has been electronically signed by DAYANA Lynch  May 9, 2023 13:28 EDT    Part of this note may be an electronic transcription/translation of spoken language to printed text using the Dragon Dictation System.

## 2023-05-08 RX ORDER — VILAZODONE HYDROCHLORIDE 10 MG/1
TABLET ORAL
Qty: 30 TABLET | Refills: 0 | Status: SHIPPED | OUTPATIENT
Start: 2023-05-08 | End: 2023-05-09

## 2023-05-09 ENCOUNTER — OFFICE VISIT (OUTPATIENT)
Dept: PSYCHIATRY | Facility: CLINIC | Age: 48
End: 2023-05-09
Payer: COMMERCIAL

## 2023-05-09 DIAGNOSIS — F33.1 MAJOR DEPRESSIVE DISORDER, RECURRENT EPISODE, MODERATE: Chronic | ICD-10-CM

## 2023-05-09 DIAGNOSIS — F41.1 GENERALIZED ANXIETY DISORDER: Chronic | ICD-10-CM

## 2023-05-09 RX ORDER — VILAZODONE HYDROCHLORIDE 20 MG/1
20 TABLET ORAL DAILY
Qty: 30 TABLET | Refills: 4 | Status: SHIPPED | OUTPATIENT
Start: 2023-05-09

## 2023-06-08 ENCOUNTER — TELEPHONE (OUTPATIENT)
Dept: ENDOCRINOLOGY | Facility: CLINIC | Age: 48
End: 2023-06-08
Payer: COMMERCIAL

## 2023-06-13 NOTE — PROGRESS NOTES
Date: June 14, 2023  Time In: 1307  Time Out: 1409      PROGRESS NOTE  Data:  Yun cMgowan is a 48 y.o. female who presents today for individual therapy session at [facility] with NARCISO Woodruff LCSW.     Patient Chief Complaint: Follow-up for depression and anxiety    Clinical Maneuvering/Intervention: Yun is pleasant and alert and oriented to person place and time.  She talked about the frustration that she has with how she is not healing well after her surgery and radiation therapy.  She does continue to work with her doctors and trying to find ways to take care of these challenges.  She also feels very overwhelmed with the situation she has with applying for social security disability and having long-term disability.  We discussed the grief component that she has with having to deal with history of cancer.    Assisted patient in processing above session content; acknowledged and normalized patient’s thoughts, feelings, and concerns. Rationalized patient thought process regarding navigating the Healthcare system. Discussed triggers associated with patient's anxiety. Also discussed coping skills for patient to implement such as self stabilization skills.    Allowed patient to freely discuss issues without interruption or judgment. Provided safe, confidential environment to facilitate the development of positive therapeutic relationship and encourage open, honest communication. Assisted patient in identifying risk factors which would indicate the need for higher level of care including thoughts to harm self or others and/or self-harming behavior and encouraged patient to contact this office, call 911, or present to the nearest emergency room should any of these events occur. Discussed crisis intervention services and means to access. Patient adamantly and convincingly denies current suicidal or homicidal ideation or perceptual disturbance.    Assessment   Patient appears to maintain  relative stability as compared to their baseline. However, patient continues to struggle with depression and anxiety which continues to cause impairment in important areas of functioning. A result, they can be reasonably expected to continue to benefit from treatment and would likely be at increased risk for decompensation otherwise.    Mental Status Exam:   Hygiene:   good  Cooperation:  Cooperative  Eye Contact:  Good  Psychomotor Behavior:  Appropriate  Affect:  Appropriate  Mood: Normal  Speech:  Normal  Thought Process:  Goal directed and Linear  Thought Content:  Normal  Suicidal:  None  Homicidal:  None  Hallucinations:  None  Delusion:  None  Memory:  Intact  Orientation:  Person, Place, Time and Situation  Reliability:  good  Insight:  Good  Judgement:  Good  Impulse Control:  Good  Physical/Medical Issues:  See diagnosis list       PHQ-Score Total:  PHQ-9 Total Score: PHQ-9 Depression Screening  Little interest or pleasure in doing things? 1-->several days   Feeling down, depressed, or hopeless? 1-->several days   Trouble falling or staying asleep, or sleeping too much? 1-->several days   Feeling tired or having little energy? 1-->several days   Poor appetite or overeating? 1-->several days   Feeling bad about yourself - or that you are a failure or have let yourself or your family down? 0-->not at all   Trouble concentrating on things, such as reading the newspaper or watching television? 1-->several days   Moving or speaking so slowly that other people could have noticed? Or the opposite - being so fidgety or restless that you have been moving around a lot more than usual? 0-->not at all   Thoughts that you would be better off dead, or of hurting yourself in some way? 0-->not at all   PHQ-9 Total Score 6   If you checked off any problems, how difficult have these problems made it for you to do your work, take care of things at home, or get along with other people?        MARTINE-7 Total Score:   Over the  last two weeks, how often have you been bothered by the following problems?  Feeling nervous, anxious or on edge: Several days  Not being able to stop or control worrying: Several days  Worrying too much about different things: Several days  Trouble Relaxing: Several days  Being so restless that it is hard to sit still: Not at all  Becoming easily annoyed or irritable: Not at all  Feeling afraid as if something awful might happen: Not at all  MARTINE 7 Total Score: 4     Patient's Support Network Includes:  significant other     Functional Status: Moderate impairment      Progress toward goal: Not at goal     Prognosis: Good with Ongoing Treatment             Plan     Resources: Patient was provided with the following community resources: None at this time    Patient will continue in individual outpatient therapy with focus on improved functioning and coping skills, maintaining stability, and avoiding decompensation and the need for higher level of care.    Patient will adhere to any medication regimens as prescribed and report any side effects. Patient will contact this office, call 911 or present to the nearest emergency room should suicidal or homicidal ideations occur. Provide cognitive behavioral therapy and solution focused therapy to improve functioning, maintain stability and avoid decompensation and the need for higher level of care.     Return in about 4-6 weeks, or earlier if symptoms worsen or fail to improve.           VISIT DIAGNOSIS:     ICD-10-CM ICD-9-CM   1. Major depressive disorder, recurrent episode, moderate  F33.1 296.32   2. Generalized anxiety disorder  F41.1 300.02            This document has been electronically signed by NARCISO Woodruff, MARLENE   June 14, 2023 16:49 EDT      Part of this note may be an electronic transcription/translation of spoken language to printed text using the Dragon Dictation System.

## 2023-06-14 ENCOUNTER — OFFICE VISIT (OUTPATIENT)
Dept: PSYCHIATRY | Facility: CLINIC | Age: 48
End: 2023-06-14
Payer: COMMERCIAL

## 2023-06-14 DIAGNOSIS — F33.1 MAJOR DEPRESSIVE DISORDER, RECURRENT EPISODE, MODERATE: Primary | Chronic | ICD-10-CM

## 2023-06-14 DIAGNOSIS — F41.1 GENERALIZED ANXIETY DISORDER: ICD-10-CM

## 2023-07-28 RX ORDER — MESALAMINE 1000 MG/1
SUPPOSITORY RECTAL
COMMUNITY
Start: 2023-06-12 | End: 2023-07-31

## 2023-07-28 RX ORDER — SEMAGLUTIDE 1.34 MG/ML
INJECTION, SOLUTION SUBCUTANEOUS
COMMUNITY

## 2023-07-28 RX ORDER — AMOXICILLIN AND CLAVULANATE POTASSIUM 875; 125 MG/1; MG/1
TABLET, FILM COATED ORAL
COMMUNITY
Start: 2023-06-27 | End: 2023-07-31

## 2023-07-28 RX ORDER — CODEINE SULFATE 30 MG/1
TABLET ORAL
COMMUNITY
Start: 2023-07-11

## 2023-07-28 RX ORDER — FUROSEMIDE 20 MG/1
20 TABLET ORAL DAILY
COMMUNITY
Start: 2023-04-05 | End: 2023-07-31

## 2023-07-28 RX ORDER — SPIRONOLACTONE 25 MG/1
25 TABLET ORAL DAILY
COMMUNITY
Start: 2023-04-05 | End: 2023-07-31

## 2023-07-28 RX ORDER — PEN NEEDLE, DIABETIC 31 GX5/16"
NEEDLE, DISPOSABLE MISCELLANEOUS
COMMUNITY
Start: 2023-06-07

## 2023-07-28 RX ORDER — VITAMIN E 268 MG
400 CAPSULE ORAL
COMMUNITY
Start: 2023-04-03 | End: 2023-07-31 | Stop reason: SDUPTHER

## 2023-07-28 RX ORDER — OFLOXACIN 3 MG/ML
SOLUTION/ DROPS OPHTHALMIC
COMMUNITY
Start: 2023-06-27 | End: 2023-07-31

## 2023-07-28 RX ORDER — CYCLOBENZAPRINE HCL 5 MG
TABLET ORAL
COMMUNITY
Start: 2023-04-05 | End: 2023-07-31

## 2023-07-28 RX ORDER — VILAZODONE HYDROCHLORIDE 10 MG/1
10 TABLET ORAL DAILY
COMMUNITY
Start: 2023-04-05 | End: 2023-07-31

## 2023-07-28 RX ORDER — TOPIRAMATE 25 MG/1
1 TABLET ORAL DAILY
COMMUNITY
Start: 2023-06-11 | End: 2023-07-31

## 2023-07-28 RX ORDER — HYDROCORTISONE ACETATE 25 MG/1
SUPPOSITORY RECTAL
COMMUNITY
Start: 2023-07-11

## 2023-07-28 RX ORDER — SUCRALFATE ORAL 1 G/10ML
SUSPENSION ORAL
COMMUNITY
Start: 2023-05-10

## 2023-07-28 RX ORDER — INSULIN LISPRO 200 [IU]/ML
70 INJECTION, SOLUTION SUBCUTANEOUS
COMMUNITY
Start: 2023-05-31 | End: 2023-07-31 | Stop reason: SDUPTHER

## 2023-07-31 ENCOUNTER — LAB (OUTPATIENT)
Dept: LAB | Facility: HOSPITAL | Age: 48
End: 2023-07-31
Payer: COMMERCIAL

## 2023-07-31 ENCOUNTER — OFFICE VISIT (OUTPATIENT)
Dept: ENDOCRINOLOGY | Facility: CLINIC | Age: 48
End: 2023-07-31
Payer: COMMERCIAL

## 2023-07-31 VITALS
BODY MASS INDEX: 31.52 KG/M2 | WEIGHT: 208 LBS | OXYGEN SATURATION: 98 % | HEART RATE: 90 BPM | HEIGHT: 68 IN | DIASTOLIC BLOOD PRESSURE: 80 MMHG | SYSTOLIC BLOOD PRESSURE: 142 MMHG

## 2023-07-31 DIAGNOSIS — E11.65 TYPE 2 DIABETES MELLITUS WITH HYPERGLYCEMIA, WITHOUT LONG-TERM CURRENT USE OF INSULIN: ICD-10-CM

## 2023-07-31 DIAGNOSIS — I10 PRIMARY HYPERTENSION: ICD-10-CM

## 2023-07-31 DIAGNOSIS — E89.0 POSTABLATIVE HYPOTHYROIDISM: Primary | ICD-10-CM

## 2023-07-31 DIAGNOSIS — E78.5 HYPERLIPIDEMIA, UNSPECIFIED HYPERLIPIDEMIA TYPE: ICD-10-CM

## 2023-07-31 DIAGNOSIS — E89.0 POSTABLATIVE HYPOTHYROIDISM: ICD-10-CM

## 2023-07-31 DIAGNOSIS — E66.9 OBESITY WITH SERIOUS COMORBIDITY, UNSPECIFIED CLASSIFICATION, UNSPECIFIED OBESITY TYPE: ICD-10-CM

## 2023-07-31 LAB
T4 FREE SERPL-MCNC: 0.77 NG/DL (ref 0.93–1.7)
TSH SERPL DL<=0.05 MIU/L-ACNC: 4.46 UIU/ML (ref 0.27–4.2)

## 2023-07-31 PROCEDURE — 84443 ASSAY THYROID STIM HORMONE: CPT

## 2023-07-31 PROCEDURE — 84439 ASSAY OF FREE THYROXINE: CPT

## 2023-07-31 PROCEDURE — 36415 COLL VENOUS BLD VENIPUNCTURE: CPT

## 2023-07-31 RX ORDER — TIRZEPATIDE 5 MG/.5ML
5 INJECTION, SOLUTION SUBCUTANEOUS
COMMUNITY
Start: 2023-07-27 | End: 2023-07-31

## 2023-07-31 RX ORDER — LEVOTHYROXINE SODIUM 0.15 MG/1
150 TABLET ORAL DAILY
Qty: 30 TABLET | Refills: 11 | Status: SHIPPED | OUTPATIENT
Start: 2023-07-31 | End: 2024-07-30

## 2023-08-04 ENCOUNTER — OFFICE VISIT (OUTPATIENT)
Dept: PSYCHIATRY | Facility: CLINIC | Age: 48
End: 2023-08-04
Payer: COMMERCIAL

## 2023-08-04 DIAGNOSIS — F33.1 MAJOR DEPRESSIVE DISORDER, RECURRENT EPISODE, MODERATE: ICD-10-CM

## 2023-08-04 DIAGNOSIS — F41.1 GENERALIZED ANXIETY DISORDER WITH PANIC ATTACKS: Primary | ICD-10-CM

## 2023-08-04 DIAGNOSIS — F41.0 GENERALIZED ANXIETY DISORDER WITH PANIC ATTACKS: Primary | ICD-10-CM

## 2023-08-07 NOTE — PROGRESS NOTES
Subjective   Yun Mcgowan is a 48 y.o. female who presents today for follow-up for psychiatric medication management.     Chief Complaint:  Depression, anxiety     History of Present Illness:     Medication adjustments last visit:  Continue Wellbutrin 300mg.  Continue Viibryd, increase to 20mg.     At today's visit , she states she is doing about the same.   Her mother lives three doors down and she has fallen. This worries her.   She feels like medications are ok.   No suicidal thoughts.  No HI/AVH.   Her anxiety has been bad. She worries about her mom. She also is wanting to go back to work soon.   We discussed increasing the vilazodone to 40mg and decreasing the Wellbutrin to 150mg. She was agreeable to this.     Patient presents with symptoms and behaviors that are consistent with the following DSM-5 diagnoses:  Major depressive disorder, mod, recurrent  2. Generalized anxiety disorder    The following portions of the patient's history were reviewed and updated as appropriate: allergies, current medications, past family history, past medical history, past social history, past surgical history and problem list.    PAST OUTPATIENT TREATMENT  Diagnosis treated:  Affective Disorder, Anxiety/Panic Disorder  Treatment Type:  Medication Management  Prior Psychiatric Medications:  Xanax - helpful for panic.  Bupropion XL 300mg  Gabapentin  phenteramine - for wt loss  Promethazine - N/V  topomax 25 bid - migraines  Support Groups:  None  Sequelae Of Mental Disorder:  emotional distress    Interval History  No Change    Side Effects  None      Past Medical History:  Past Medical History:   Diagnosis Date    Adenocarcinoma of rectum     Adenoma of liver     Alopecia     Anxiety     Diabetes mellitus     Disease of thyroid gland     GERD (gastroesophageal reflux disease)     Graves disease     Hyperlipidemia     Hypertension     IBS (irritable bowel syndrome)        Social History:  Social History     Socioeconomic  History    Marital status:      Spouse name: Ryan    Number of children: 0    Highest education level: Bachelor's degree (e.g., BA, AB, BS)   Tobacco Use    Smoking status: Never    Smokeless tobacco: Never   Vaping Use    Vaping Use: Never used   Substance and Sexual Activity    Alcohol use: Yes     Alcohol/week: 2.0 standard drinks     Types: 2 Drinks containing 0.5 oz of alcohol per week    Drug use: Yes     Types: Marijuana     Comment: Delta 8 etable once weeekly    Sexual activity: Defer       Family History:  History reviewed. No pertinent family history.    Past Surgical History:  Past Surgical History:   Procedure Laterality Date    CATARACT EXTRACTION Right 07/24/2023    COLONOSCOPY      ILEOSTOMY  2022    LAPAROSCOPIC OOPHORECTOMY Bilateral        Problem List:  Patient Active Problem List   Diagnosis    Rectal cancer    Benign neoplasm of liver    Diabetes mellitus    Gastroesophageal reflux disease    Hepatic cirrhosis    Hyperlipidemia    Hypertension    Lipodystrophy    Liver mass    Migraine headache    Obesity    Alopecia    Postablative hypothyroidism    Seasonal allergic rhinitis    Premature atrial contraction    Recurrent major depressive disorder, in partial remission    Generalized anxiety disorder    Adjustment disorder with anxious mood    Major depressive disorder, recurrent episode, moderate       Allergy:   Allergies   Allergen Reactions    Wound Dressing Adhesive Itching     Skin gets itchy and irritated        Discontinued Medications:  Medications Discontinued During This Encounter   Medication Reason    magnesium gluconate (MAGONATE) 500 MG tablet     hydrOXYzine (ATARAX) 50 MG tablet     NP Thyroid 120 MG tablet     sucralfate (CARAFATE) 1 GM/10ML suspension     buPROPion XL (WELLBUTRIN XL) 300 MG 24 hr tablet          Current Medications:   Current Outpatient Medications   Medication Sig Dispense Refill    AZO CRANBERRY GUMMIES PO   2 tabs, Oral, Daily, 0 Refill(s)      B-D  ULTRAFINE III SHORT PEN 31G X 8 MM misc USE TO INJECT INSULIN (1) ONE TO (4 ) FOUR TIMES DAILY E11.9      betamethasone dipropionate 0.05 % cream APPLY TO RIGHT WRIST 4 TO 5 NIGHTS A WEEK AS NEEDED ACTIVE AREAS      buPROPion XL (Wellbutrin XL) 150 MG 24 hr tablet Take 1 tablet by mouth Every Morning. 30 tablet 2    codeine 30 MG tablet TAKE 1 TABLET BY MOUTH EVERY 6 (SIX) HOURS IF NEEDED FOR MODERATE PAIN FOR UP TO 5 DAYS.      fexofenadine (ALLEGRA) 60 MG tablet Take 1 tablet by mouth Daily.      gabapentin (NEURONTIN) 300 MG capsule TAKE 1 CAPSULE BY MOUTH AT BEDTIME FOR 3 DAYS ,THEN TWICE DAILY FOR 3 DAYS, THEN THREE TIMES A DAY      HumaLOG KwikPen 200 UNIT/ML solution pen-injector INJECT 70 UNITS UNDER THE SKIN 3 (THREE) TIMES A DAY WITH MEALS.      hydrocortisone (ANUSOL-HC) 25 MG suppository INSERT 1 SUPPOSITORY RECTALLY (25 MG TOTAL) TWICE A DAY FOR 10 DAYS      ibuprofen (ADVIL,MOTRIN) 800 MG tablet Take  by mouth.      Insulin Degludec (Tresiba FlexTouch) 200 UNIT/ML solution pen-injector pen injection Inject 80 Units under the skin into the appropriate area as directed Daily.      levothyroxine (Synthroid) 150 MCG tablet Take 1 tablet by mouth Daily. 30 tablet 11    lisinopril (PRINIVIL,ZESTRIL) 10 MG tablet Take 1 tablet by mouth Daily.      loperamide (IMODIUM) 2 MG capsule Take 1 capsule by mouth 3 (Three) Times a Day.      Melatonin 10 MG tablet Take  by mouth.      Ozempic, 1 MG/DOSE, 4 MG/3ML solution pen-injector INJECT 1 MG SUBCUTANEOUSLY EVERY WEEK      pantoprazole (PROTONIX) 40 MG EC tablet Take 1 tablet by mouth Daily.      promethazine (PHENERGAN) 25 MG tablet TAKE 1 TABLET BY MOUTH EVERY 4 TO 6 HOURS AS NEEDED      rizatriptan (MAXALT) 10 MG tablet Take 1 tablet by mouth 1 (One) Time As Needed for Migraine. May repeat in 2 hours if needed      silver sulfadiazine (SILVADENE, SSD) 1 % cream Apply 1 application topically to the appropriate area as directed 2 (Two) Times a Day. 50 g 2     vilazodone (VIIBRYD) 40 MG tablet tablet Take 1 tablet by mouth Daily With Dinner. 30 tablet 2    vitamin B-12 (CYANOCOBALAMIN) 1000 MCG tablet Take 1 tablet by mouth Daily.      vitamin D3 125 MCG (5000 UT) capsule capsule Take 1 capsule by mouth Daily.      vitamin E 400 UNIT capsule 1 capsule.       No current facility-administered medications for this visit.         Psychological ROS: positive for - anxiety and depression  negative for - behavioral disorder, concentration difficulties, decreased libido, disorientation, hallucinations, hostility, irritability, memory difficulties, mood swings, obsessive thoughts, physical abuse, sexual abuse, sleep disturbances or suicidal ideation      Physical Exam:   There were no vitals taken for this visit.    Mental Status Exam:   Hygiene:   good  Cooperation:  Cooperative  Eye Contact:  Good  Psychomotor Behavior:  Appropriate  Affect:  Appropriate  Mood: depressed and anxious  Hopelessness: Denies  Speech:  Normal  Thought Process:  Linear  Thought Content:  Normal  Suicidal:  None  Homicidal:  None  Hallucinations:  None  Delusion:  None  Memory:  Intact  Orientation:  Person, Place, Time and Situation  Reliability:  good  Insight:  Good  Judgement:  Good  Impulse Control:  Good  Physical/Medical Issues:  Yes Colorectal cancer       Mental status exam was reviewed and compared to visit on 5/9/23 and appropriate updates were made.     PHQ-9 Depression Screening    Little interest or pleasure in doing things? 0-->not at all   Feeling down, depressed, or hopeless? 1-->several days   Trouble falling or staying asleep, or sleeping too much? 1-->several days   Feeling tired or having little energy? 1-->several days   Poor appetite or overeating? 1-->several days   Feeling bad about yourself - or that you are a failure or have let yourself or your family down? 0-->not at all   Trouble concentrating on things, such as reading the newspaper or watching television? 1-->several  days   Moving or speaking so slowly that other people could have noticed? Or the opposite - being so fidgety or restless that you have been moving around a lot more than usual? 0-->not at all   Thoughts that you would be better off dead, or of hurting yourself in some way? 0-->not at all   PHQ-9 Total Score 5   If you checked off any problems, how difficult have these problems made it for you to do your work, take care of things at home, or get along with other people? somewhat difficult        Never smoker    I advised Ondrea of the risks of tobacco use.     Result Review:    Labs:  Lab on 07/31/2023   Component Date Value Ref Range Status    TSH 07/31/2023 4.460 (H)  0.270 - 4.200 uIU/mL Final    Free T4 07/31/2023 0.77 (L)  0.93 - 1.70 ng/dL Final    T4 results may be falsely increased if patient taking Biotin.       Assessment & Plan   Diagnoses and all orders for this visit:    1. Major depressive disorder, recurrent episode, moderate (Primary)  -     vilazodone (VIIBRYD) 40 MG tablet tablet; Take 1 tablet by mouth Daily With Dinner.  Dispense: 30 tablet; Refill: 2  -     buPROPion XL (Wellbutrin XL) 150 MG 24 hr tablet; Take 1 tablet by mouth Every Morning.  Dispense: 30 tablet; Refill: 2    2. Generalized anxiety disorder with panic attacks  -     vilazodone (VIIBRYD) 40 MG tablet tablet; Take 1 tablet by mouth Daily With Dinner.  Dispense: 30 tablet; Refill: 2  -     buPROPion XL (Wellbutrin XL) 150 MG 24 hr tablet; Take 1 tablet by mouth Every Morning.  Dispense: 30 tablet; Refill: 2           Continue Wellbutrin XL but decrease to 150mg.   Continue Viibryd, increase to 40mg.        Visit Diagnoses:    ICD-10-CM ICD-9-CM   1. Major depressive disorder, recurrent episode, moderate  F33.1 296.32   2. Generalized anxiety disorder with panic attacks  F41.1 300.02    F41.0 300.01         TREATMENT PLAN/GOALS: Continue supportive psychotherapy efforts and medications as indicated. Treatment and medication options  discussed during today's visit. Patient ackowledged and verbally consented to continue with current treatment plan and was educated on the importance of compliance with treatment and follow-up appointments.    MEDICATION ISSUES:  INSPECT reviewed as expected    Discussed medication options and treatment plan of prescribed medication as well as the risks, benefits, and side effects including potential falls, possible impaired driving and metabolic adversities among others. Patient is agreeable to call the office with any worsening of symptoms or onset of side effects. Patient is agreeable to call 911 or go to the nearest ER should he/she begin having SI/HI. No medication side effects or related complaints today.     MEDS ORDERED DURING VISIT:  New Medications Ordered This Visit   Medications    vilazodone (VIIBRYD) 40 MG tablet tablet     Sig: Take 1 tablet by mouth Daily With Dinner.     Dispense:  30 tablet     Refill:  2    buPROPion XL (Wellbutrin XL) 150 MG 24 hr tablet     Sig: Take 1 tablet by mouth Every Morning.     Dispense:  30 tablet     Refill:  2       Return in about 2 months (around 10/9/2023).         This document has been electronically signed by DAYANA Lynch  August 9, 2023 13:39 EDT    Part of this note may be an electronic transcription/translation of spoken language to printed text using the Dragon Dictation System.

## 2023-08-09 ENCOUNTER — OFFICE VISIT (OUTPATIENT)
Dept: PSYCHIATRY | Facility: CLINIC | Age: 48
End: 2023-08-09
Payer: COMMERCIAL

## 2023-08-09 DIAGNOSIS — F33.1 MAJOR DEPRESSIVE DISORDER, RECURRENT EPISODE, MODERATE: Primary | Chronic | ICD-10-CM

## 2023-08-09 DIAGNOSIS — F41.0 GENERALIZED ANXIETY DISORDER WITH PANIC ATTACKS: Chronic | ICD-10-CM

## 2023-08-09 DIAGNOSIS — F41.1 GENERALIZED ANXIETY DISORDER WITH PANIC ATTACKS: Chronic | ICD-10-CM

## 2023-08-09 RX ORDER — VILAZODONE HYDROCHLORIDE 40 MG/1
40 TABLET ORAL
Qty: 30 TABLET | Refills: 2 | Status: SHIPPED | OUTPATIENT
Start: 2023-08-09

## 2023-08-09 RX ORDER — BUPROPION HYDROCHLORIDE 150 MG/1
150 TABLET ORAL EVERY MORNING
Qty: 30 TABLET | Refills: 2 | Status: SHIPPED | OUTPATIENT
Start: 2023-08-09 | End: 2024-08-08

## 2023-08-23 DIAGNOSIS — E89.0 POSTABLATIVE HYPOTHYROIDISM: ICD-10-CM

## 2023-08-23 RX ORDER — LEVOTHYROXINE SODIUM 0.15 MG/1
TABLET ORAL
Qty: 90 TABLET | Refills: 3 | Status: SHIPPED | OUTPATIENT
Start: 2023-08-23

## 2023-08-23 NOTE — TELEPHONE ENCOUNTER
Per [employer] policy, when refills come in if there are any red check marks or if it was a paper/verbal request it has to go to the provider to approve. Forwarding Script to provider.

## 2023-08-30 LAB
T4 FREE SERPL-MCNC: 1.67 NG/DL (ref 0.82–1.77)
TSH SERPL DL<=0.005 MIU/L-ACNC: 0.26 UIU/ML (ref 0.45–4.5)

## 2023-08-31 ENCOUNTER — TELEPHONE (OUTPATIENT)
Dept: ENDOCRINOLOGY | Facility: CLINIC | Age: 48
End: 2023-08-31
Payer: COMMERCIAL

## 2023-08-31 NOTE — PROGRESS NOTES
"Date: September 1, 2023  Time In: 1010  Time Out: 1104      PROGRESS NOTE  Data:  Yun Mcgowan is a 48 y.o. female who presents today for individual therapy session at [facility] with NARCISO Woodruff, MARLENE.     Patient Chief Complaint: Follow-up for depression and anxiety    Clinical Maneuvering/Intervention: Yun is pleasant, alert and oriented to person place and time.  She states that she feels a little better.  This is due to her mom feeling better and her not having to care give quite as much for her.  However, she does feel overwhelmed about the prospect of going on vacation with her mother here.  She has few friends and family and she does not feel that any are truly willing to check on her mom.  This is impacts her mood and anxiety in a way that she feels like she is \"on a roller coaster\".    Assisted patient in processing above session content; acknowledged and normalized patient's thoughts, feelings, and concerns. Rationalized patient thought process regarding the relationship. Discussed triggers associated with patient's anxiety and depression. Also discussed coping skills for patient to implement such as continuing skills already built.    Allowed patient to freely discuss issues without interruption or judgment. Provided safe, confidential environment to facilitate the development of positive therapeutic relationship and encourage open, honest communication. Assisted patient in identifying risk factors which would indicate the need for higher level of care including thoughts to harm self or others and/or self-harming behavior and encouraged patient to contact this office, call 911, or present to the nearest emergency room should any of these events occur. Discussed crisis intervention services and means to access. Patient adamantly and convincingly denies current suicidal or homicidal ideation or perceptual disturbance.    Assessment   Patient appears to maintain relative " stability as compared to their baseline. However, patient continues to struggle with anxiety and depression which continues to cause impairment in important areas of functioning. A result, they can be reasonably expected to continue to benefit from treatment and would likely be at increased risk for decompensation otherwise.    Mental Status Exam:   Hygiene:   good  Cooperation:  Cooperative  Eye Contact:  Good  Psychomotor Behavior:  Appropriate  Affect:  Appropriate  Mood: Tearful at times   speech:  Normal  Thought Process:  Goal directed and Linear  Thought Content:  Normal  Suicidal:  None  Homicidal:  None  Hallucinations:  None  Delusion:  None  Memory:  Intact  Orientation:  Person, Place, Time and Situation  Reliability:  good  Insight:  Good  Judgement:  Good  Impulse Control:  Good  Physical/Medical Issues:  See diagnosis list       PHQ-Score Total:  PHQ-9 Total Score: PHQ-9 Depression Screening  Little interest or pleasure in doing things? 0-->not at all   Feeling down, depressed, or hopeless? 1-->several days   Trouble falling or staying asleep, or sleeping too much? 1-->several days   Feeling tired or having little energy? 1-->several days   Poor appetite or overeating? 1-->several days   Feeling bad about yourself - or that you are a failure or have let yourself or your family down? 0-->not at all   Trouble concentrating on things, such as reading the newspaper or watching television? 0-->not at all   Moving or speaking so slowly that other people could have noticed? Or the opposite - being so fidgety or restless that you have been moving around a lot more than usual? 0-->not at all   Thoughts that you would be better off dead, or of hurting yourself in some way? 0-->not at all   PHQ-9 Total Score 4   If you checked off any problems, how difficult have these problems made it for you to do your work, take care of things at home, or get along with other people?        MARTINE-7 Total Score:   Over the last  two weeks, how often have you been bothered by the following problems?  Feeling nervous, anxious or on edge: Several days  Not being able to stop or control worrying: Not at all  Worrying too much about different things: Several days  Trouble Relaxing: Several days  Being so restless that it is hard to sit still: Not at all  Becoming easily annoyed or irritable: Several days  Feeling afraid as if something awful might happen: Not at all  MARTINE 7 Total Score: 4     Patient's Support Network Includes:  significant other     Functional Status: Moderate impairment      Progress toward goal: Not at goal     Prognosis: Good with Ongoing Treatment        Plan     Resources: Patient was provided with the following community resources: None at this time    Patient will continue in individual outpatient therapy with focus on improved functioning and coping skills, maintaining stability, and avoiding decompensation and the need for higher level of care.    Patient will adhere to any medication regimens as prescribed and report any side effects. Patient will contact this office, call 911 or present to the nearest emergency room should suicidal or homicidal ideations occur. Provide cognitive behavioral therapy and solution focused therapy to improve functioning, maintain stability and avoid decompensation and the need for higher level of care.     Return in about 4-6 weeks, or earlier if symptoms worsen or fail to improve.           VISIT DIAGNOSIS:     ICD-10-CM ICD-9-CM   1. Recurrent major depressive disorder, in partial remission  F33.41 296.35   2. Generalized anxiety disorder with panic attacks  F41.1 300.02    F41.0 300.01            This document has been electronically signed by NARCISO Woodruff, MARLENE   September 1, 2023 12:22 EDT      Part of this note may be an electronic transcription/translation of spoken language to printed text using the Dragon Dictation System.

## 2023-08-31 NOTE — TELEPHONE ENCOUNTER
Caller: Yun Mcgowan    Relationship to patient: Self    Best call back number: [phone]    Patient is needing: THYROID RESULTS SHOW THYROID IS OVER ACTIVE. THE MEDICATION SHE IS TAKING IS GIVING HER HEADACHES AND SHE WANTED TO ADJUST HER MEDICATION OR ASKED WHAT SHE COULD DO TO STOP THE HEADACHES. SHE HAS AN APPT ON 9/5/23 AND WOULD LIKE SOME RELIEF BEFORE HER APPT

## 2023-09-01 ENCOUNTER — OFFICE VISIT (OUTPATIENT)
Dept: PSYCHIATRY | Facility: CLINIC | Age: 48
End: 2023-09-01
Payer: COMMERCIAL

## 2023-09-01 DIAGNOSIS — F33.41 RECURRENT MAJOR DEPRESSIVE DISORDER, IN PARTIAL REMISSION: Primary | ICD-10-CM

## 2023-09-01 DIAGNOSIS — F41.0 GENERALIZED ANXIETY DISORDER WITH PANIC ATTACKS: ICD-10-CM

## 2023-09-01 DIAGNOSIS — F41.1 GENERALIZED ANXIETY DISORDER WITH PANIC ATTACKS: ICD-10-CM

## 2023-09-01 RX ORDER — LEVOTHYROXINE SODIUM 0.12 MG/1
125 TABLET ORAL DAILY
Qty: 30 TABLET | Refills: 11 | Status: SHIPPED | OUTPATIENT
Start: 2023-09-01 | End: 2024-08-31

## 2023-09-01 NOTE — TELEPHONE ENCOUNTER
Update:  - Please call patient and ask her to change dose to 125 mcg daily.  - She can miss the dose of 2 days and then start new dose.    Eitan Aranda MD  15:07 EDT  09/01/23

## 2023-09-03 DIAGNOSIS — F41.1 GENERALIZED ANXIETY DISORDER WITH PANIC ATTACKS: Chronic | ICD-10-CM

## 2023-09-03 DIAGNOSIS — F33.1 MAJOR DEPRESSIVE DISORDER, RECURRENT EPISODE, MODERATE: Chronic | ICD-10-CM

## 2023-09-03 DIAGNOSIS — F41.0 GENERALIZED ANXIETY DISORDER WITH PANIC ATTACKS: Chronic | ICD-10-CM

## 2023-09-04 RX ORDER — BUPROPION HYDROCHLORIDE 150 MG/1
TABLET ORAL
Qty: 30 TABLET | Refills: 2 | Status: SHIPPED | OUTPATIENT
Start: 2023-09-04

## 2023-09-05 ENCOUNTER — OFFICE VISIT (OUTPATIENT)
Dept: ENDOCRINOLOGY | Facility: CLINIC | Age: 48
End: 2023-09-05
Payer: COMMERCIAL

## 2023-09-05 VITALS
BODY MASS INDEX: 31.52 KG/M2 | WEIGHT: 208 LBS | HEART RATE: 99 BPM | HEIGHT: 68 IN | DIASTOLIC BLOOD PRESSURE: 75 MMHG | OXYGEN SATURATION: 95 % | SYSTOLIC BLOOD PRESSURE: 140 MMHG

## 2023-09-05 DIAGNOSIS — E66.9 OBESITY WITH SERIOUS COMORBIDITY, UNSPECIFIED CLASSIFICATION, UNSPECIFIED OBESITY TYPE: ICD-10-CM

## 2023-09-05 DIAGNOSIS — E78.5 HYPERLIPIDEMIA, UNSPECIFIED HYPERLIPIDEMIA TYPE: ICD-10-CM

## 2023-09-05 DIAGNOSIS — E11.65 TYPE 2 DIABETES MELLITUS WITH HYPERGLYCEMIA, WITHOUT LONG-TERM CURRENT USE OF INSULIN: ICD-10-CM

## 2023-09-05 DIAGNOSIS — E89.0 POSTABLATIVE HYPOTHYROIDISM: Primary | ICD-10-CM

## 2023-09-05 DIAGNOSIS — I10 PRIMARY HYPERTENSION: ICD-10-CM

## 2023-09-05 PROBLEM — R74.8 ABNORMAL LIVER ENZYMES: Status: ACTIVE | Noted: 2023-06-02

## 2023-09-05 PROBLEM — K62.5 RECTAL BLEEDING: Status: ACTIVE | Noted: 2022-04-06

## 2023-09-05 PROBLEM — L71.9 ROSACEA: Status: ACTIVE | Noted: 2023-06-02

## 2023-09-05 PROBLEM — Z91.148 NONCOMPLIANCE WITH MEDICATION REGIMEN: Status: ACTIVE | Noted: 2023-01-11

## 2023-09-05 PROBLEM — K92.1 MELENA: Status: ACTIVE | Noted: 2023-06-02

## 2023-09-05 PROBLEM — K92.1 HEMATOCHEZIA: Status: ACTIVE | Noted: 2023-09-05

## 2023-09-05 PROBLEM — R19.8 ALTERED BOWEL FUNCTION: Status: ACTIVE | Noted: 2023-06-02

## 2023-09-05 PROBLEM — M25.519 SHOULDER PAIN: Status: ACTIVE | Noted: 2023-09-05

## 2023-09-05 PROBLEM — F32.9 MAJOR DEPRESSIVE DISORDER WITH SINGLE EPISODE: Status: ACTIVE | Noted: 2023-06-02

## 2023-09-05 PROBLEM — Z85.048 HISTORY OF RECTAL CANCER: Status: ACTIVE | Noted: 2023-09-05

## 2023-09-05 PROBLEM — Z23 COVID-19 VACCINE ADMINISTERED: Status: ACTIVE | Noted: 2023-09-05

## 2023-09-05 PROBLEM — K75.81 NONALCOHOLIC STEATOHEPATITIS (NASH): Status: ACTIVE | Noted: 2023-06-02

## 2023-09-05 PROBLEM — E07.9 THYROID DISEASE: Status: ACTIVE | Noted: 2023-09-05

## 2023-09-05 PROBLEM — J45.998 OTHER ASTHMA: Status: ACTIVE | Noted: 2023-06-02

## 2023-09-05 PROBLEM — K59.00 CONSTIPATION: Status: ACTIVE | Noted: 2023-06-02

## 2023-09-05 PROBLEM — D37.4 NEOPLASM OF UNCERTAIN BEHAVIOR OF COLON: Status: ACTIVE | Noted: 2022-04-06

## 2023-09-05 PROBLEM — K56.609 UNSPECIFIED INTESTINAL OBSTRUCTION, UNSPECIFIED AS TO PARTIAL VERSUS COMPLETE OBSTRUCTION: Status: ACTIVE | Noted: 2022-04-06

## 2023-09-05 PROBLEM — G47.30 SLEEP APNEA: Status: ACTIVE | Noted: 2023-06-02

## 2023-09-05 PROBLEM — Z23 ENCOUNTER FOR IMMUNIZATION: Status: ACTIVE | Noted: 2023-06-02

## 2023-09-05 PROBLEM — K62.5 HEMORRHAGE OF ANUS AND RECTUM: Status: ACTIVE | Noted: 2022-04-06

## 2023-09-05 PROBLEM — K63.5 POLYP OF COLON: Status: ACTIVE | Noted: 2022-04-06

## 2023-09-05 PROBLEM — K58.9 IRRITABLE BOWEL SYNDROME: Status: ACTIVE | Noted: 2023-06-02

## 2023-09-05 PROBLEM — K76.0 FATTY LIVER: Status: ACTIVE | Noted: 2023-09-05

## 2023-09-05 PROBLEM — Z86.010 PERSONAL HISTORY OF COLONIC POLYPS: Status: ACTIVE | Noted: 2023-06-02

## 2023-09-05 PROBLEM — K56.609 INTESTINAL OBSTRUCTION: Status: ACTIVE | Noted: 2022-04-06

## 2023-09-05 PROBLEM — Z85.048 PERSONAL HISTORY OF OTHER MALIGNANT NEOPLASM OF RECTUM, RECTOSIGMOID JUNCTION, AND ANUS: Status: ACTIVE | Noted: 2023-06-02

## 2023-09-05 PROBLEM — T18.2XXA FOREIGN BODY IN STOMACH: Status: ACTIVE | Noted: 2022-09-06

## 2023-09-05 PROBLEM — T18.2XXA GASTRIC FOREIGN BODY: Status: ACTIVE | Noted: 2022-09-06

## 2023-09-05 PROBLEM — R18.8 ASCITES: Status: ACTIVE | Noted: 2023-06-02

## 2023-09-05 PROBLEM — R19.7 DIARRHEA: Status: ACTIVE | Noted: 2023-06-02

## 2023-09-05 PROBLEM — R19.4 CHANGE IN BOWEL HABITS: Status: ACTIVE | Noted: 2023-09-05

## 2023-09-05 PROBLEM — J30.2 SEASONAL ALLERGIES: Status: ACTIVE | Noted: 2023-09-05

## 2023-09-05 PROBLEM — Z86.0100 PERSONAL HISTORY OF COLONIC POLYPS: Status: ACTIVE | Noted: 2023-06-02

## 2023-09-05 RX ORDER — TOPIRAMATE 25 MG/1
1 TABLET ORAL DAILY
COMMUNITY
Start: 2023-09-02

## 2023-09-05 NOTE — PATIENT INSTRUCTIONS
Please,    #Levothyroxine Dosing:    - Continue levothyroxine tablets 125 mcg by mouth daily at 6:00 AM  - Take it every day, on an empty stomach, 30 minutes before eating  - Avoid taking it with iron, calcium, other medications (blocks absorption), wait at least 4 hrs after taking levothyroxine to take these medications  - If you miss a pill, you can take 2 the next day and return to schedule from next day     -Try the brand-name Synthyroid as provided with the samples, if you are able to tolerate in 7 days then please give the call to the office and I will put the new prescription for brand-name Synthyroid for you.    -Repeat thyroid work-up lab in 1 month time and again before next visit.    Follow up in 3 months time.    Thank you for your visit today.    If you have any questions or concerns please feel free to reach out of the office.

## 2023-09-05 NOTE — PROGRESS NOTES
-----------------------------------------------------------------  ENDOCRINE CLINIC NOTE  -----------------------------------------------------------------        PATIENT NAME: Yun Mcgowan  PATIENT : 1975 AGE: 48 y.o.  MRN NUMBER: [id number]  PRIMARY CARE: Harley De La Cruz MD    ==========================================================================    CHIEF COMPLAINT: Hypothyroidism  DATE OF SERVICE: 2023         ==========================================================================    HPI / SUBJECTIVE    48 y.o. female is seen in the clinic today for follow up of hypothyroidism.  Hx of Grave's Disease s/p radiation and now post ablative hypothyroidism.  Last visit was 2023.  Initially she was being managed by endocrinology with levothyroxine. Then she started following a holistic nurse practitioner was being managed with [facility]NP thyroid.  Patient was maintained on 150 mcg of [place] Thyroid and was started on Levothyroxine last visit 2023.  Currently taking 125 mcg PO daily changed on 23, Previous dose 150 mcg PO Daily.  Patient still reports to have some symptoms specially of the headache since she started taking levothyroxine therapy.    Patient also have a history of type 2 diabetes but is currently following different endocrinologist and plans to continue with her for diabetes management.  Patient is another endocrinologist is planning to be retiring in the next year or so therefore she wants to pursue care about thyroid with me and later once Dr. Jensen retires then she will transition to my clinic for diabetes management as well.    ==========================================================================                                                PAST MEDICAL HISTORY    Past Medical History:   Diagnosis Date    Adenocarcinoma of rectum     Adenoma of liver     Alopecia     Anxiety     Diabetes mellitus     Disease of thyroid gland     GERD  (gastroesophageal reflux disease)     Graves disease     Hyperlipidemia     Hypertension     IBS (irritable bowel syndrome)        ==========================================================================    PAST SURGICAL HISTORY    Past Surgical History:   Procedure Laterality Date    CATARACT EXTRACTION Right 07/24/2023    COLONOSCOPY      ILEOSTOMY  2022    LAPAROSCOPIC OOPHORECTOMY Bilateral        ==========================================================================    FAMILY HISTORY    History reviewed. No pertinent family history.    ==========================================================================    SOCIAL HISTORY    Social History     Socioeconomic History    Marital status:      Spouse name: Ryan    Number of children: 0    Highest education level: Bachelor's degree (e.g., BA, AB, BS)   Tobacco Use    Smoking status: Never    Smokeless tobacco: Never   Vaping Use    Vaping Use: Never used   Substance and Sexual Activity    Alcohol use: Yes     Alcohol/week: 2.0 standard drinks     Types: 2 Drinks containing 0.5 oz of alcohol per week    Drug use: Yes     Types: Marijuana     Comment: Delta 8 etable once weeekly / Gummies    Sexual activity: Defer       ==========================================================================    MEDICATIONS      Current Outpatient Medications:     AZO CRANBERRY GUMMIES PO,  2 tabs, Oral, Daily, 0 Refill(s), Disp: , Rfl:     B-D ULTRAFINE III SHORT PEN 31G X 8 MM misc, USE TO INJECT INSULIN (1) ONE TO (4 ) FOUR TIMES DAILY E11.9, Disp: , Rfl:     betamethasone dipropionate 0.05 % cream, APPLY TO RIGHT WRIST 4 TO 5 NIGHTS A WEEK AS NEEDED ACTIVE AREAS, Disp: , Rfl:     buPROPion XL (WELLBUTRIN XL) 150 MG 24 hr tablet, TAKE 1 TABLET BY MOUTH EVERY DAY IN THE MORNING, Disp: 30 tablet, Rfl: 2    codeine 30 MG tablet, TAKE 1 TABLET BY MOUTH EVERY 6 (SIX) HOURS IF NEEDED FOR MODERATE PAIN FOR UP TO 5 DAYS., Disp: , Rfl:     fexofenadine (ALLEGRA) 60 MG  tablet, Take 1 tablet by mouth Daily., Disp: , Rfl:     gabapentin (NEURONTIN) 300 MG capsule, TAKE 1 CAPSULE BY MOUTH AT BEDTIME FOR 3 DAYS ,THEN TWICE DAILY FOR 3 DAYS, THEN THREE TIMES A DAY, Disp: , Rfl:     HumaLOG KwikPen 200 UNIT/ML solution pen-injector, INJECT 70 UNITS UNDER THE SKIN 3 (THREE) TIMES A DAY WITH MEALS., Disp: , Rfl:     hydrocortisone (ANUSOL-HC) 25 MG suppository, INSERT 1 SUPPOSITORY RECTALLY (25 MG TOTAL) TWICE A DAY FOR 10 DAYS, Disp: , Rfl:     ibuprofen (ADVIL,MOTRIN) 800 MG tablet, Take  by mouth., Disp: , Rfl:     Insulin Degludec (Tresiba FlexTouch) 200 UNIT/ML solution pen-injector pen injection, Inject 80 Units under the skin into the appropriate area as directed Daily., Disp: , Rfl:     levothyroxine (Synthroid) 125 MCG tablet, Take 1 tablet by mouth Daily., Disp: 30 tablet, Rfl: 11    lisinopril (PRINIVIL,ZESTRIL) 10 MG tablet, Take 1 tablet by mouth Daily., Disp: , Rfl:     loperamide (IMODIUM) 2 MG capsule, Take 1 capsule by mouth 3 (Three) Times a Day., Disp: , Rfl:     Melatonin 10 MG tablet, Take  by mouth., Disp: , Rfl:     Ozempic, 1 MG/DOSE, 4 MG/3ML solution pen-injector, INJECT 1 MG SUBCUTANEOUSLY EVERY WEEK, Disp: , Rfl:     pantoprazole (PROTONIX) 40 MG EC tablet, Take 1 tablet by mouth Daily., Disp: , Rfl:     promethazine (PHENERGAN) 25 MG tablet, TAKE 1 TABLET BY MOUTH EVERY 4 TO 6 HOURS AS NEEDED, Disp: , Rfl:     rizatriptan (MAXALT) 10 MG tablet, Take 1 tablet by mouth 1 (One) Time As Needed for Migraine. May repeat in 2 hours if needed, Disp: , Rfl:     silver sulfadiazine (SILVADENE, SSD) 1 % cream, Apply 1 application topically to the appropriate area as directed 2 (Two) Times a Day., Disp: 50 g, Rfl: 2    topiramate (TOPAMAX) 25 MG tablet, Take 1 tablet by mouth Daily., Disp: , Rfl:     vilazodone (VIIBRYD) 40 MG tablet tablet, Take 1 tablet by mouth Daily With Dinner., Disp: 30 tablet, Rfl: 2    vitamin B-12 (CYANOCOBALAMIN) 1000 MCG tablet, Take 1 tablet  by mouth Daily., Disp: , Rfl:     vitamin D3 125 MCG (5000 UT) capsule capsule, Take 1 capsule by mouth Daily., Disp: , Rfl:     vitamin E 400 UNIT capsule, 1 capsule., Disp: , Rfl:     ==========================================================================    ALLERGIES    Allergies   Allergen Reactions    Adhesive Tape Unknown - Low Severity, Itching and Rash    Wound Dressing Adhesive Itching     Skin gets itchy and irritated       ==========================================================================    OBJECTIVE    Vitals:    09/05/23 1329   BP: 140/75   Pulse: 99   SpO2: 95%     Body mass index is 31.63 kg/m².     General: Alert, cooperative, no acute distress  Thyroid:  no enlargement/tenderness/palpable nodules  Lungs: Clear to auscultation bilaterally, respirations unlabored  Heart: Regular rate and rhythm, S1 and S2 normal, no murmur, rub or gallop  Abdomen: Soft, NT, ND and Bowel sounds Positive  Extremities:  Extremities normal, atraumatic, no cyanosis or edema    ==========================================================================    LAB EVALUATION    Lab Results   Component Value Date    GLUCOSE 206 (H) 10/10/2022    BUN 6 10/10/2022    CREATININE 0.66 10/10/2022    BCR 9.1 10/10/2022    K 4.0 10/10/2022    CO2 22.0 10/10/2022    CALCIUM 9.2 10/10/2022     Lab Results   Component Value Date    HGBA1C 9.2 (H) 11/07/2020     Lab Results   Component Value Date    CREATININE 0.66 10/10/2022     Lab Results   Component Value Date    TSH 0.261 (L) 08/29/2023    FREET4 1.67 08/29/2023       ==========================================================================    ASSESSMENT AND PLAN    Assessment:  #Hypothyroidism, post ablative  #T2DM  #Obesity  #HLD  #HTN     Plan:  -Patient is currently tolerating levothyroxine therapy which was adjusted 1 day ago from 150 mcg to 125 mcg daily  - Patient reports to have headache while taking levothyroxine which she was not suffering while taking [facility] or  "NP thyroid  - There is a possibility that patient is not able to tolerate the salt associated with levothyroxine in a generic form  - She will benefit from brand-name Synthyroid, patient with samples of 125 mcg pills to try, if she is able to tolerate it then will write the prescription for brand-name Synthyroid to be taken  - There are other formulations as well that includes liquid capsules of levothyroxine that can be ordered under brand name to have persistent salt associated with that and monitor patient for tolerance  - In any case [place] Thyroid versus NP thyroid may not be a good option again due to high quantity of T3 as compared to T4 which is supraphysiological dose and puts her at the risk for cardiac issues versus osteoporosis  - This was discussed with patient in detail to which she verbalized understanding  - Patient to otherwise continue with her previously established endocrinologist for type 2 diabetes management and will switch care once the previous endocrinologist retires  - Repeat thyroid functions in 1 month time and again before next visit, will adjust plan if needed according to 1 month follow-up thyroid lab work results    Return to clinic: 3 months    Entire assessment and plan was discussed and counseled the patient in detail to which patient verbalized understanding and agreed with care.  Answered all queries and concerns.    This note was created using voice recognition software and is inherently subject to errors including those of syntax and \"sound-alike\" substitutions which may escape proofreading.  In such instances, original meaning may be extrapolated by contextual derivation.    Note: Portions of this note may have been copied from previous notes but documentation have been reviewed and edited as necessary to support clinical decision making for today's visit.    ==========================================================================    INFORMATION PROVIDED TO " PATIENT    Patient Instructions   Please,    #Levothyroxine Dosing:    - Continue levothyroxine tablets 125 mcg by mouth daily at 6:00 AM  - Take it every day, on an empty stomach, 30 minutes before eating  - Avoid taking it with iron, calcium, other medications (blocks absorption), wait at least 4 hrs after taking levothyroxine to take these medications  - If you miss a pill, you can take 2 the next day and return to schedule from next day     -Try the brand-name Synthyroid as provided with the samples, if you are able to tolerate in 7 days then please give the call to the office and I will put the new prescription for brand-name Synthyroid for you.    -Repeat thyroid work-up lab in 1 month time and again before next visit.    Follow up in 3 months time.    Thank you for your visit today.    If you have any questions or concerns please feel free to reach out of the office.       ==========================================================================  Eitan Aranda MD  Department of Endocrine, Diabetes and Metabolism  [facility], [place]  ==========================================================================

## 2023-09-06 ENCOUNTER — APPOINTMENT (OUTPATIENT)
Dept: WOMENS IMAGING | Facility: HOSPITAL | Age: 48
End: 2023-09-06
Payer: COMMERCIAL

## 2023-09-06 PROCEDURE — G0279 TOMOSYNTHESIS, MAMMO: HCPCS | Performed by: RADIOLOGY

## 2023-09-06 PROCEDURE — 77066 DX MAMMO INCL CAD BI: CPT | Performed by: RADIOLOGY

## 2023-09-06 PROCEDURE — 77062 BREAST TOMOSYNTHESIS BI: CPT | Performed by: RADIOLOGY

## 2023-09-06 PROCEDURE — 76642 ULTRASOUND BREAST LIMITED: CPT | Performed by: RADIOLOGY

## 2023-09-18 ENCOUNTER — TELEPHONE (OUTPATIENT)
Dept: ENDOCRINOLOGY | Facility: CLINIC | Age: 48
End: 2023-09-18

## 2023-09-18 NOTE — TELEPHONE ENCOUNTER
Caller: Yun Mcgowan    Relationship to patient: Self    Best call back number: [phone]    Patient is needing: PT RECEIVED SOME SAMPLES OF SYNTHROID LAST VISIT AND THEY ARE WORKING BETTER. WOULD LIKE A WRITTEN PRESCRIPTION SENT OVER TO [facility] IN [facility] [address] URGENTLY. PLEASE CALL PT WHEN IT IS FILLED.

## 2023-09-21 ENCOUNTER — PATIENT MESSAGE (OUTPATIENT)
Dept: ENDOCRINOLOGY | Facility: CLINIC | Age: 48
End: 2023-09-21
Payer: COMMERCIAL

## 2023-09-21 RX ORDER — LEVOTHYROXINE SODIUM 0.12 MG/1
125 TABLET ORAL DAILY
Qty: 30 TABLET | Refills: 11 | Status: SHIPPED | OUTPATIENT
Start: 2023-09-21 | End: 2023-09-22 | Stop reason: SDUPTHER

## 2023-09-22 ENCOUNTER — TELEPHONE (OUTPATIENT)
Dept: ENDOCRINOLOGY | Facility: CLINIC | Age: 48
End: 2023-09-22
Payer: COMMERCIAL

## 2023-09-22 RX ORDER — LEVOTHYROXINE SODIUM 125 MCG
125 TABLET ORAL DAILY
Qty: 30 TABLET | Refills: 11 | Status: SHIPPED | OUTPATIENT
Start: 2023-09-22 | End: 2024-09-21

## 2023-09-22 NOTE — TELEPHONE ENCOUNTER
From: Yun Mcgowan  To: Eitan Manoj  Sent: 9/21/2023 12:28 PM EDT  Subject: Synthroid prescription     You gave me samples of synthroid because I was having issues with migraines. Ever since I started using the different medicine, my migraines have improved. So I’m happy to switch to the new meds. However, I called this past Monday to ask that you call in a prescription of this to [facility] in [place]. That has not been done yet. I tried calling today and the phone rings off the hook—no one will answer on the prompt that says “press three if you need a written prescription.”     The level of customer service there is not great as far as when you call in. It takes days to get a response and when I’ve indicated it’s urgent, there’s still literally no action or response. It’s very frustrating and disappointing, and I think you should know this also affects how people view you and your practice.     Please let me know when this will be taken care of. Thank you!

## 2023-09-22 NOTE — TELEPHONE ENCOUNTER
"   Specialty Pharmacy Patient Management Program       Yun Mcgowan is a 48 y.o. female seen by an Endocrinology provider for Thyroid Disorder.    Sending updated prescription with \"dispense as written\" for pharmacy to be able to process as name brand.    Requested Prescriptions     Signed Prescriptions Disp Refills    Synthroid 125 MCG tablet 30 tablet 11     Sig: Take 1 tablet by mouth Daily.     Authorizing Provider: TODD REYES     Ordering User: SAYRA GUERRERO       Refill sent in to patient's pharmacy for above medication prescribed by Dr. De La Cruz.   Last office visit 9/5/23.  Next visit scheduled 11/28/23.    Sayra Guerrero, PharmD  Clinical Specialty Pharmacist, Endocrinology  9/22/2023  11:56 EDT   "

## 2023-10-05 NOTE — PROGRESS NOTES
Subjective   Yun Mcgowan is a 48 y.o. female who presents today for follow-up for psychiatric medication management.     Chief Complaint:  Depression, anxiety     History of Present Illness:     Medication adjustments last visit:  Continue Wellbutrin XL but decrease to 150mg.   Continue Viibryd, increase to 40mg.     At today's visit, patient states she is having a lot of headaches. She states sometimes she forgets to take meds  until its 7-8pm. She also has diarrhea. She is unsure if it is related to her colon cancer or the meds. She feels like the headaches have gotten worse since she went up to the 40mg of Viibryd.   She does feel like the viibryd has helped her depression and anxiety though.   We discussed decreasing Viibryd to 20mg for now, and doing the Restlet test to get some guidance. She has tried a lot of SSRIs in the past and not had good improvement.   She denies any SI/HI/AVH.       Patient presents with symptoms and behaviors that are consistent with the following DSM-5 diagnoses:  Major depressive disorder, mod, recurrent  2. Generalized anxiety disorder    The following portions of the patient's history were reviewed and updated as appropriate: allergies, current medications, past family history, past medical history, past social history, past surgical history and problem list.    PAST OUTPATIENT TREATMENT  Diagnosis treated:  Affective Disorder, Anxiety/Panic Disorder  Treatment Type:  Medication Management  Prior Psychiatric Medications:  Xanax - helpful for panic.  Bupropion XL 300mg  Gabapentin  phenteramine - for wt loss  Promethazine - N/V  topomax 25 bid - migraines  Pristiq  Fluoxetine--wasn't helping   Support Groups:  None  Sequelae Of Mental Disorder:  emotional distress    Interval History  Some improvement.     Side Effects  Diarrhea and headaches      Past Medical History:  Past Medical History:   Diagnosis Date    Adenocarcinoma of rectum     Adenoma of liver     Alopecia      Anxiety     Diabetes mellitus     Disease of thyroid gland     GERD (gastroesophageal reflux disease)     Graves disease     Hyperlipidemia     Hypertension     IBS (irritable bowel syndrome)        Social History:  Social History     Socioeconomic History    Marital status:      Spouse name: Ryan    Number of children: 0    Highest education level: Bachelor's degree (e.g., BA, AB, BS)   Tobacco Use    Smoking status: Never    Smokeless tobacco: Never   Vaping Use    Vaping Use: Never used   Substance and Sexual Activity    Alcohol use: Yes     Alcohol/week: 2.0 standard drinks of alcohol     Types: 2 Drinks containing 0.5 oz of alcohol per week    Drug use: Yes     Types: Marijuana     Comment: Delta 8 etable once weeekly / Gummies    Sexual activity: Defer       Family History:  History reviewed. No pertinent family history.    Past Surgical History:  Past Surgical History:   Procedure Laterality Date    CATARACT EXTRACTION Right 07/24/2023    COLONOSCOPY      ILEOSTOMY  2022    LAPAROSCOPIC OOPHORECTOMY Bilateral        Problem List:  Patient Active Problem List   Diagnosis    Rectal cancer    Benign neoplasm of liver    Diabetes mellitus    Gastroesophageal reflux disease    Hepatic cirrhosis    Hyperlipidemia    Hypertension    Lipodystrophy    Liver mass    Migraine headache    Obesity    Alopecia    Postablative hypothyroidism    Seasonal allergic rhinitis    Premature atrial contraction    Recurrent major depressive disorder, in partial remission    Generalized anxiety disorder    Adjustment disorder with anxious mood    Major depressive disorder, recurrent episode, moderate    Abnormal liver enzymes    Altered bowel function    Change in bowel habits    Constipation    Diarrhea    Ascites    Encounter for immunization    Fatty liver    Hematochezia    History of rectal cancer    Irritable bowel syndrome    Major depressive disorder with single episode    Melena    Neoplasm of uncertain behavior of  colon    Nonalcoholic steatohepatitis (DAMON)    Noncompliance with medication regimen    Personal history of colonic polyps    Personal history of other malignant neoplasm of rectum, rectosigmoid junction, and anus    Polyp of colon    Hemorrhage of anus and rectum    Rectal bleeding    Rosacea    Seasonal allergies    Other asthma    Sleep apnea    Intestinal obstruction    Unspecified intestinal obstruction, unspecified as to partial versus complete obstruction    Thyroid disease    Shoulder pain    Foreign body in stomach    Gastric foreign body    COVID-19 vaccine administered       Allergy:   Allergies   Allergen Reactions    Adhesive Tape Unknown - Low Severity, Itching and Rash    Wound Dressing Adhesive Itching     Skin gets itchy and irritated        Discontinued Medications:  There are no discontinued medications.        Current Medications:   Current Outpatient Medications   Medication Sig Dispense Refill    AZO CRANBERRY GUMMIES PO   2 tabs, Oral, Daily, 0 Refill(s)      B-D ULTRAFINE III SHORT PEN 31G X 8 MM misc USE TO INJECT INSULIN (1) ONE TO (4 ) FOUR TIMES DAILY E11.9      betamethasone dipropionate 0.05 % cream APPLY TO RIGHT WRIST 4 TO 5 NIGHTS A WEEK AS NEEDED ACTIVE AREAS      buPROPion XL (WELLBUTRIN XL) 150 MG 24 hr tablet TAKE 1 TABLET BY MOUTH EVERY DAY IN THE MORNING 30 tablet 2    codeine 30 MG tablet TAKE 1 TABLET BY MOUTH EVERY 6 (SIX) HOURS IF NEEDED FOR MODERATE PAIN FOR UP TO 5 DAYS.      fexofenadine (ALLEGRA) 60 MG tablet Take 1 tablet by mouth Daily.      gabapentin (NEURONTIN) 300 MG capsule TAKE 1 CAPSULE BY MOUTH AT BEDTIME FOR 3 DAYS ,THEN TWICE DAILY FOR 3 DAYS, THEN THREE TIMES A DAY      HumaLOG KwikPen 200 UNIT/ML solution pen-injector INJECT 70 UNITS UNDER THE SKIN 3 (THREE) TIMES A DAY WITH MEALS.      hydrocortisone (ANUSOL-HC) 25 MG suppository INSERT 1 SUPPOSITORY RECTALLY (25 MG TOTAL) TWICE A DAY FOR 10 DAYS      ibuprofen (ADVIL,MOTRIN) 800 MG tablet Take  by  mouth.      Insulin Degludec (Tresiba FlexTouch) 200 UNIT/ML solution pen-injector pen injection Inject 80 Units under the skin into the appropriate area as directed Daily.      lisinopril (PRINIVIL,ZESTRIL) 10 MG tablet Take 1 tablet by mouth Daily.      loperamide (IMODIUM) 2 MG capsule Take 1 capsule by mouth 3 (Three) Times a Day.      Melatonin 10 MG tablet Take  by mouth.      Ozempic, 1 MG/DOSE, 4 MG/3ML solution pen-injector INJECT 1 MG SUBCUTANEOUSLY EVERY WEEK      pantoprazole (PROTONIX) 40 MG EC tablet Take 1 tablet by mouth Daily.      promethazine (PHENERGAN) 25 MG tablet TAKE 1 TABLET BY MOUTH EVERY 4 TO 6 HOURS AS NEEDED      rizatriptan (MAXALT) 10 MG tablet Take 1 tablet by mouth 1 (One) Time As Needed for Migraine. May repeat in 2 hours if needed      silver sulfadiazine (SILVADENE, SSD) 1 % cream Apply 1 application topically to the appropriate area as directed 2 (Two) Times a Day. 50 g 2    Synthroid 125 MCG tablet Take 1 tablet by mouth Daily. 30 tablet 11    topiramate (TOPAMAX) 25 MG tablet Take 1 tablet by mouth Daily.      vilazodone (VIIBRYD) 40 MG tablet tablet Take 1 tablet by mouth Daily With Dinner. 30 tablet 2    vitamin B-12 (CYANOCOBALAMIN) 1000 MCG tablet Take 1 tablet by mouth Daily.      vitamin D3 125 MCG (5000 UT) capsule capsule Take 1 capsule by mouth Daily.      vitamin E 400 UNIT capsule 1 capsule.       No current facility-administered medications for this visit.         Psychological ROS: positive for - anxiety and depression  negative for - behavioral disorder, concentration difficulties, decreased libido, disorientation, hallucinations, hostility, irritability, memory difficulties, mood swings, obsessive thoughts, physical abuse, sexual abuse, sleep disturbances or suicidal ideation      Physical Exam:   There were no vitals taken for this visit.    Mental Status Exam:   Hygiene:   good  Cooperation:  Cooperative  Eye Contact:  Good  Psychomotor Behavior:   Appropriate  Affect:  Appropriate  Mood: depressed and anxious  Hopelessness: Denies  Speech:  Normal  Thought Process:  Linear  Thought Content:  Normal  Suicidal:  None  Homicidal:  None  Hallucinations:  None  Delusion:  None  Memory:  Intact  Orientation:  Person, Place, Time and Situation  Reliability:  good  Insight:  Good  Judgement:  Good  Impulse Control:  Good  Physical/Medical Issues:  Yes Colorectal cancer       Mental status exam was reviewed and compared to visit on 8/9/23 and appropriate updates were made.     PHQ-9 Depression Screening    Little interest or pleasure in doing things? 0-->not at all   Feeling down, depressed, or hopeless? 1-->several days   Trouble falling or staying asleep, or sleeping too much? 1-->several days   Feeling tired or having little energy? 1-->several days   Poor appetite or overeating? 0-->not at all   Feeling bad about yourself - or that you are a failure or have let yourself or your family down? 0-->not at all   Trouble concentrating on things, such as reading the newspaper or watching television? 1-->several days   Moving or speaking so slowly that other people could have noticed? Or the opposite - being so fidgety or restless that you have been moving around a lot more than usual? 0-->not at all   Thoughts that you would be better off dead, or of hurting yourself in some way? 0-->not at all   PHQ-9 Total Score 4   If you checked off any problems, how difficult have these problems made it for you to do your work, take care of things at home, or get along with other people? somewhat difficult        Never smoker    I advised Ondrea of the risks of tobacco use.     Result Review:    Labs:  Orders Only on 08/29/2023   Component Date Value Ref Range Status    TSH 08/29/2023 0.261 (L)  0.450 - 4.500 uIU/mL Final    Free T4 08/29/2023 1.67  0.82 - 1.77 ng/dL Final   Lab on 07/31/2023   Component Date Value Ref Range Status    TSH 07/31/2023 4.460 (H)  0.270 - 4.200 uIU/mL  Final    Free T4 07/31/2023 0.77 (L)  0.93 - 1.70 ng/dL Final    T4 results may be falsely increased if patient taking Biotin.       Assessment & Plan   Diagnoses and all orders for this visit:    1. Major depressive disorder, recurrent episode, moderate (Primary)  -     [facility] - Swab,; Future    2. Generalized anxiety disorder with panic attacks  -     [facility] - Swab,; Future      Continue Wellbutrin XL 150mg.   Continue Viibryd, decrease to 20mg.   [facility] testing completed today to possibly switch Viibryd due to side effects.        Visit Diagnoses:    ICD-10-CM ICD-9-CM   1. Major depressive disorder, recurrent episode, moderate  F33.1 296.32   2. Generalized anxiety disorder with panic attacks  F41.1 300.02    F41.0 300.01           TREATMENT PLAN/GOALS: Continue supportive psychotherapy efforts and medications as indicated. Treatment and medication options discussed during today's visit. Patient ackowledged and verbally consented to continue with current treatment plan and was educated on the importance of compliance with treatment and follow-up appointments.    MEDICATION ISSUES:  INSPECT reviewed as expected    Discussed medication options and treatment plan of prescribed medication as well as the risks, benefits, and side effects including potential falls, possible impaired driving and metabolic adversities among others. Patient is agreeable to call the office with any worsening of symptoms or onset of side effects. Patient is agreeable to call 911 or go to the nearest ER should he/she begin having SI/HI. No medication side effects or related complaints today.     MEDS ORDERED DURING VISIT:  No orders of the defined types were placed in this encounter.      Return in about 2 months (around 12/10/2023).         This document has been electronically signed by DAYANA Lynch  October 10, 2023 13:28 EDT    Part of this note may be an electronic transcription/translation of spoken language to  printed text using the Dragon Dictation System.

## 2023-10-10 ENCOUNTER — OFFICE VISIT (OUTPATIENT)
Dept: PSYCHIATRY | Facility: CLINIC | Age: 48
End: 2023-10-10
Payer: COMMERCIAL

## 2023-10-10 DIAGNOSIS — F33.1 MAJOR DEPRESSIVE DISORDER, RECURRENT EPISODE, MODERATE: Primary | Chronic | ICD-10-CM

## 2023-10-10 DIAGNOSIS — F41.1 GENERALIZED ANXIETY DISORDER WITH PANIC ATTACKS: Chronic | ICD-10-CM

## 2023-10-10 DIAGNOSIS — F41.0 GENERALIZED ANXIETY DISORDER WITH PANIC ATTACKS: Chronic | ICD-10-CM

## 2023-10-11 NOTE — PROGRESS NOTES
Date: October 12, 2023  Time In: 1110  Time Out: 1154      PROGRESS NOTE  Data:  Yun Mcgowan is a 48 y.o. female who presents today for individual therapy session at [facility] with NARCISO Woodruff, MARLENE.     Patient Chief Complaint: Follow-up for depression and anxiety    Clinical Maneuvering/Intervention: Yun is pleasant, alert and oriented to person place and time.  She spoke about her and her  not being able to take their honeymoon trip and it was good.  She found that she could do things more than she thought she might be able to.  She does find tasks at home to be overwhelming as she feels like she does not get enough done.  She wonders if she has ADHD and describes herself as being overwhelmed by multiple tasks, being frustrated by tasks, and when overwhelmed she tends to freeze.  She also spoke about her upcoming scan, the first 1 that she will have after her chemotherapy and radiation and the anxiety related to that.  Discussed the normalcy and validated feelings of this anxiety related to upcoming scans..    Assisted patient in processing above session content; acknowledged and normalized patient's thoughts, feelings, and concerns. Rationalized patient thought process regarding breaking tasks down into manageable time periods; making small lists no more than 5 items.  Increasing anxiety relieving techniques close her time to her scan.  Discussed triggers associated with patient's anxiety. Also discussed coping skills for patient to implement such as continuing with what has already built.    Allowed patient to freely discuss issues without interruption or judgment. Provided safe, confidential environment to facilitate the development of positive therapeutic relationship and encourage open, honest communication. Assisted patient in identifying risk factors which would indicate the need for higher level of care including thoughts to harm self or others and/or  self-harming behavior and encouraged patient to contact this office, call 911, or present to the nearest emergency room should any of these events occur. Discussed crisis intervention services and means to access. Patient adamantly and convincingly denies current suicidal or homicidal ideation or perceptual disturbance.    Assessment   Patient appears to maintain relative stability as compared to their baseline. However, patient continues to struggle with depression and anxiety which continues to cause impairment in important areas of functioning. A result, they can be reasonably expected to continue to benefit from treatment and would likely be at increased risk for decompensation otherwise.    Mental Status Exam:   Hygiene:   good  Cooperation:  Cooperative  Eye Contact:  Good  Psychomotor Behavior:  Appropriate  Affect:  Appropriate  Mood: Tearful at times   speech:  Normal  Thought Process:  Goal directed and Linear  Thought Content:  Normal  Suicidal:  None  Homicidal:  None  Hallucinations:  None  Delusion:  None  Memory:  Intact  Orientation:  Person, Place, Time and Situation  Reliability:  good  Insight:  Good  Judgement:  Good  Impulse Control:  Good  Physical/Medical Issues:  See diagnosis list     PHQ-Score Total:  PHQ-9 Total Score: PHQ-9 Depression Screening  Little interest or pleasure in doing things? 1-->several days   Feeling down, depressed, or hopeless? 0-->not at all   Trouble falling or staying asleep, or sleeping too much? 1-->several days   Feeling tired or having little energy? 1-->several days   Poor appetite or overeating? 1-->several days   Feeling bad about yourself - or that you are a failure or have let yourself or your family down? 0-->not at all   Trouble concentrating on things, such as reading the newspaper or watching television? 1-->several days   Moving or speaking so slowly that other people could have noticed? Or the opposite - being so fidgety or restless that you have been  moving around a lot more than usual? 0-->not at all   Thoughts that you would be better off dead, or of hurting yourself in some way? 0-->not at all   PHQ-9 Total Score 5   If you checked off any problems, how difficult have these problems made it for you to do your work, take care of things at home, or get along with other people?        MARTINE-7 Total Score:   Over the last two weeks, how often have you been bothered by the following problems?  Feeling nervous, anxious or on edge: Several days  Not being able to stop or control worrying: Not at all  Worrying too much about different things: Several days  Trouble Relaxing: Several days  Being so restless that it is hard to sit still: Not at all  Becoming easily annoyed or irritable: Not at all  Feeling afraid as if something awful might happen: Not at all  MARTINE 7 Total Score: 3     Includes:  significant other     Functional Status: Moderate impairment      Progress toward goal: Not at goal     Prognosis: Good with Ongoing Treatment           Plan     Resources: Patient was provided with the following community resources: None at this time    Patient will continue in individual outpatient therapy with focus on improved functioning and coping skills, maintaining stability, and avoiding decompensation and the need for higher level of care.    Patient will adhere to any medication regimens as prescribed and report any side effects. Patient will contact this office, call 911 or present to the nearest emergency room should suicidal or homicidal ideations occur. Provide cognitive behavioral therapy and solution focused therapy to improve functioning, maintain stability and avoid decompensation and the need for higher level of care.     Return in about 4-6 weeks, or earlier if symptoms worsen or fail to improve.           VISIT DIAGNOSIS:     ICD-10-CM ICD-9-CM   1. Generalized anxiety disorder  F41.1 300.02   2. Major depressive disorder, recurrent episode, moderate  F33.1  296.32            This document has been electronically signed by NARCISO Woodruff, CHERIEW   October 12, 2023 12:43 EDT      Part of this note may be an electronic transcription/translation of spoken language to printed text using the Dragon Dictation System.

## 2023-10-12 ENCOUNTER — OFFICE VISIT (OUTPATIENT)
Dept: PSYCHIATRY | Facility: CLINIC | Age: 48
End: 2023-10-12
Payer: COMMERCIAL

## 2023-10-12 DIAGNOSIS — F33.1 MAJOR DEPRESSIVE DISORDER, RECURRENT EPISODE, MODERATE: ICD-10-CM

## 2023-10-12 DIAGNOSIS — F41.1 GENERALIZED ANXIETY DISORDER: Primary | ICD-10-CM

## 2023-10-20 ENCOUNTER — TELEPHONE (OUTPATIENT)
Dept: PSYCHIATRY | Facility: CLINIC | Age: 48
End: 2023-10-20
Payer: COMMERCIAL

## 2023-10-20 NOTE — TELEPHONE ENCOUNTER
Patient called me back from a message about [facility] results.    The Viibryd is in the green and the Wellbutrin is also.    She is still having some headaches, but PCP is not sure it is the Viibryd. We spoke about the Fetzima and she will try to stay with the Viibryd right now and see what is going on with the other meds she is on with her PCP.

## 2023-10-20 NOTE — PROGRESS NOTES
RADIATION ONCOLOGY FOLLOW-UP NOTE    NAME: Yun Mcgowan  YOB: 1975  MRN #: [id number]  DATE OF SERVICE: 10/26/2023  [facility] CARE PROVIDER: Harley De La Cruz MD    DIAGNOSIS:  Locally advanced rectal adenocarcinoma  -JESSICA chemo followed by chemoXRT  -[id number]    REASON FOR VISIT/CC:  Rectal cancer, 4M F/U    RADIATION TREATMENT COURSE:  4500 cGy in 25 fractions to anal canal, followed by 540 cGy in 3 fractions boost, completed 10/26/2022.    HISTORY OF PRESENT ILLNESS: The patient is a 48 y.o. year old female who was last seen in our office on 06/22/2023. The plan was to continue close follow up with Dr. Napoles, and follow up here in 4 months.    She follows with Dr. Frances, and was last seen on 09/18/2023 by DAYANA Dumont. Their plan is to have repeat CT scans in October 2023, continue on gabapentin for neuropathy, and follow up in 3 months with physician.    The patient also follows with Dr. Napoles, and was last seen on 09/14/2023--She did have general anesthesia, will get note to see if APC was needed as it was aborted at the earlier flex sig without anesthesia.Dr. Napoles reassured her that there did not appear to be any signs of recurrence at anastomosis on flex sig and she took a polyp. They will plan for a flex sig in about 6 weeks if bleeding still noted.    CT CHEST/ABDOMEN/PELVIS W/ CONTRAST  DATE OF EXAM: 10/24/2023  FACILITY: [facility]  FINDINGS:  Chest:  4 mm subpleural nodule located posterolaterally in the right upper lobe on series 3 image 39 is a stable finding since 04/06/2022. There are no new or suspicious pulmonary nodules, and there is no acute airspace disease.  Right chest wall kt catheter extends into the lower SVC. The heart size is normal, without significant coronary artery calcifications. Trace pericardial fluid is present. There is no pleural effusion. No adenopathy is identified.  No acute or suspicious osseous abnormalities are evident  within the chest.  Abdomen & Pelvis:  New surgical anastomosis within the rectum without evidence of residual or recurrent rectal mass. There is presacral soft tissue thickening, new since the prior examination, likely reflecting sequelae of surgical and/or radiation therapy. No perirectal adenopathy is appreciated.  There is concentric thickening of the urinary bladder wall which may simply be due to incomplete distention or could reflect changes of infectious or radiation-induced cystitis.  No pathologically enlarged lymph nodes are identified within the pelvis or abdomen. No gross ascites is seen.  Advanced cirrhotic liver morphology is redemonstrated. There is a lobulated low density or cystic lesion with coarse calcifications projecting exophytically from the inferior right hepatic lobe, measuring approximately 3.3 x 2.9 x 3.6 cm (series 6 image 64 and series 7 image 32), and a bilobed low-density lesion within the lateral left hepatic segment measuring about 4.0 x 1.4 cm (series 6 image 36), each of which appears unchanged from 04/06/2022.  The gallbladder, spleen, pancreas, adrenals, and kidneys are within normal limits.  Previously described 7 cm left pelvic/adnexal cystic lesion has significantly diminished in size, now measuring about 2.7 x 2.1 cm with soft tissue rind, may represent dual collapse of an adnexal cyst or peritoneal inclusion cyst.  Severe right facet arthropathy is present at L5 5 S1. No suspicious osseous lesions are identified.  IMPRESSION:  Interval rectal resection with surgical anastomosis. New presacral crescentic soft tissue thickening is thought to represent sequelae of postsurgical and/or radiation therapy change.  Previously described left pelvic cystic lesion appears to significantly diminished in size, now with a rind of soft tissue thickening, centered in the left adnexal region. This could represent interval collapse of a peritoneal inclusion cyst or collapse of an ovarian  cyst.  No convincing evidence of metastatic disease within the chest, abdomen, pelvis or skeleton.  Cirrhotic liver morphology. Liver lesions are unchanged from 04/06/2022, and are thought to represent benign findings, such as complex cyst.  Concentric urinary bladder wall thickening may be related to under distention. Radiation-induced cystitis or infectious cystitis could also have this appearance.    Much better than when she was here 06/22/23; [facility] has recommended daily benefiber.  This has helped; still intermittent constipation/diarrhea.  If bleeding, when she has diarrhea.  She does not think internal bleeding any more.  She had diarrhea last night and denies any blood in her stool now.  She did Anusol HC for a period after she saw us.    The following portions of the patient's history were reviewed and updated as appropriate: allergies, current medications, past family history, past medical history, past social history, past surgical history and problem list. Reviewed with the patient and remain unchanged.    PAST MEDICAL HISTORY:  she has a past medical history of Adenocarcinoma of rectum, Adenoma of liver, Alopecia, Anxiety, Diabetes mellitus, Disease of thyroid gland, GERD (gastroesophageal reflux disease), Graves disease, Hyperlipidemia, Hypertension, and IBS (irritable bowel syndrome).    MEDICATIONS:    Current Outpatient Medications:     AZO CRANBERRY GUMMIES PO,  2 tabs, Oral, Daily, 0 Refill(s), Disp: , Rfl:     B-D ULTRAFINE III SHORT PEN 31G X 8 MM misc, USE TO INJECT INSULIN (1) ONE TO (4 ) FOUR TIMES DAILY E11.9, Disp: , Rfl:     betamethasone dipropionate 0.05 % cream, APPLY TO RIGHT WRIST 4 TO 5 NIGHTS A WEEK AS NEEDED ACTIVE AREAS, Disp: , Rfl:     buPROPion XL (WELLBUTRIN XL) 150 MG 24 hr tablet, TAKE 1 TABLET BY MOUTH EVERY DAY IN THE MORNING, Disp: 30 tablet, Rfl: 2    codeine 30 MG tablet, TAKE 1 TABLET BY MOUTH EVERY 6 (SIX) HOURS IF NEEDED FOR MODERATE PAIN FOR UP TO 5 DAYS., Disp: ,  Rfl:     fexofenadine (ALLEGRA) 60 MG tablet, Take 1 tablet by mouth Daily., Disp: , Rfl:     gabapentin (NEURONTIN) 300 MG capsule, TAKE 1 CAPSULE BY MOUTH AT BEDTIME FOR 3 DAYS ,THEN TWICE DAILY FOR 3 DAYS, THEN THREE TIMES A DAY, Disp: , Rfl:     HumaLOG KwikPen 200 UNIT/ML solution pen-injector, INJECT 70 UNITS UNDER THE SKIN 3 (THREE) TIMES A DAY WITH MEALS., Disp: , Rfl:     hydrocortisone (ANUSOL-HC) 25 MG suppository, INSERT 1 SUPPOSITORY RECTALLY (25 MG TOTAL) TWICE A DAY FOR 10 DAYS, Disp: , Rfl:     ibuprofen (ADVIL,MOTRIN) 800 MG tablet, Take  by mouth., Disp: , Rfl:     Insulin Degludec (Tresiba FlexTouch) 200 UNIT/ML solution pen-injector pen injection, Inject 80 Units under the skin into the appropriate area as directed Daily., Disp: , Rfl:     lisinopril (PRINIVIL,ZESTRIL) 10 MG tablet, Take 1 tablet by mouth Daily., Disp: , Rfl:     loperamide (IMODIUM) 2 MG capsule, Take 1 capsule by mouth 3 (Three) Times a Day., Disp: , Rfl:     Melatonin 10 MG tablet, Take  by mouth., Disp: , Rfl:     Ozempic, 1 MG/DOSE, 4 MG/3ML solution pen-injector, INJECT 1 MG SUBCUTANEOUSLY EVERY WEEK, Disp: , Rfl:     pantoprazole (PROTONIX) 40 MG EC tablet, Take 1 tablet by mouth Daily., Disp: , Rfl:     promethazine (PHENERGAN) 25 MG tablet, TAKE 1 TABLET BY MOUTH EVERY 4 TO 6 HOURS AS NEEDED, Disp: , Rfl:     rizatriptan (MAXALT) 10 MG tablet, Take 1 tablet by mouth 1 (One) Time As Needed for Migraine. May repeat in 2 hours if needed, Disp: , Rfl:     silver sulfadiazine (SILVADENE, SSD) 1 % cream, Apply 1 application topically to the appropriate area as directed 2 (Two) Times a Day., Disp: 50 g, Rfl: 2    Synthroid 125 MCG tablet, Take 1 tablet by mouth Daily., Disp: 30 tablet, Rfl: 11    topiramate (TOPAMAX) 25 MG tablet, Take 1 tablet by mouth Daily., Disp: , Rfl:     vilazodone (VIIBRYD) 40 MG tablet tablet, Take 1 tablet by mouth Daily With Dinner., Disp: 30 tablet, Rfl: 2    vitamin B-12 (CYANOCOBALAMIN) 1000 MCG  tablet, Take 1 tablet by mouth Daily., Disp: , Rfl:     vitamin D3 125 MCG (5000 UT) capsule capsule, Take 1 capsule by mouth Daily., Disp: , Rfl:     vitamin E 400 UNIT capsule, 1 capsule., Disp: , Rfl:     ALLERGIES:    Allergies   Allergen Reactions    Adhesive Tape Unknown - Low Severity, Itching and Rash    Wound Dressing Adhesive Itching     Skin gets itchy and irritated       PAST SURGICAL HISTORY:  she has a past surgical history that includes Colonoscopy; Laparoscopic oophorectomy (Bilateral); Cataract extraction (Right, 07/24/2023); and Ileostomy (2022).    PREVIOUS RADIOTHERAPY OR CHEMOTHERAPY:  yes    FAMILY HISTORY:  No interval changes.    SOCIAL HISTORY:  she reports that she has never smoked. She has never used smokeless tobacco. She reports current alcohol use of about 2.0 standard drinks of alcohol per week. She reports current drug use. Drug: Marijuana.    PAIN AND PAIN MANAGEMENT:  denies pain.    NUTRITIONAL STATUS:   no issues    KPS:  80:  Normal activity with effort; some signs or symptoms      REVIEW OF SYSTEMS:   General: No fevers, chills, weight change, or drenching night sweats. Skin: No rashes or jaundice.  HEENT: No change in vision or hearing, no headaches.  Neck: No dysphagia or masses.  Heme/Lymph: No easy bruising or bleeding.  Respiratory System: No shortness of breath or cough.  Cardiovascular: No chest pain, palpitations, or dyspnea on exertion. - Pacemaker. GI: as noted above.  : No dysuria or hematuria.  Endocrine: No heat or cold intolerance. Musculoskeletal: No myalgias or arthralgias.  Neuro: No weakness, numbness, syncope, or seizures. Psych: No mood changes or depression. Ext: Denies swelling.    Objective   VITAL SIGNS:  Vitals:    10/26/23 0928   BP: 136/75   Pulse: 97   Resp: 18   Temp: 98.3 °F (36.8 °C)   SpO2: 100%       PHYSICAL EXAM:  GENERAL: In no apparent distress, sitting comfortably in room.    CARDIOVASCULAR: S1 & S2 detected; no murmurs, rubs or  gallops.  CHEST: Clear to auscultation bilaterally; no wheezes, crackles or rubs. Work of breathing normal.  ABDOMEN: Bowel sounds present. Abdomen is soft, nontender, nondistended.   MUSCULOSKELETAL: No tenderness to palpation along the spine or scapulae. Normal range of motion.  EXTREMITIES: No clubbing, cyanosis, edema.  SKIN: No erythema, rashes, ulcerations noted.     PERTINENT IMAGING/PATHOLOGY/LABS (Medical Decision Making):     COORDINATION OF CARE: A copy of this note is sent to the referring provider.    PATHOLOGY (Reviewed):     IMAGING (Reviewed): interval imaging as noted above    LABS (Reviewed):  HEMATOLOGY:  WBC   Date Value Ref Range Status   10/10/2022 4.20 3.40 - 10.80 10*3/mm3 Final   11/07/2020 10.30 4.5 - 11.0 10*3/uL Final     RBC   Date Value Ref Range Status   10/10/2022 3.85 3.77 - 5.28 10*6/mm3 Final   11/07/2020 4.17 4.0 - 5.2 10*6/uL Final     Hemoglobin   Date Value Ref Range Status   10/10/2022 11.9 (L) 12.0 - 15.9 g/dL Final   11/07/2020 12.4 12.0 - 16.0 g/dL Final     Hematocrit   Date Value Ref Range Status   10/10/2022 35.4 34.0 - 46.6 % Final   11/07/2020 36.8 36.0 - 46.0 % Final     Platelets   Date Value Ref Range Status   10/10/2022 109 (L) 140 - 450 10*3/mm3 Final   11/07/2020 233 140 - 440 10*3/uL Final     CHEMISTRY:  Lab Results   Component Value Date    GLUCOSE 206 (H) 10/10/2022    BUN 6 10/10/2022    CREATININE 0.66 10/10/2022    BCR 9.1 10/10/2022    K 4.0 10/10/2022    CO2 22.0 10/10/2022    CALCIUM 9.2 10/10/2022     Assessment & Plan   ASSESSMENT AND PLAN:    Locally advanced rectal adenocarcinoma  -JESSICA chemo followed by chemoXRT  -[id number]  -Interval scans cNED  -Following closely with Dr. Napoles--improved  -Has Anusol HC if needed    Will f/u here 01/25/2024 or earlier as needed.    This assessment comes from my review of the imaging, pathology, physician notes and other pertinent information as mentioned.        TIME SPENT WITH PATIENT:   I spent 20 minutes  caring for Ondrea on this date of service. This time includes time spent by me in the following activities: preparing for the visit, reviewing tests, obtaining and/or reviewing a separately obtained history, performing a medically appropriate examination and/or evaluation, documenting information in the medical record, and care coordination    CC: MD Tushar Friedman MD        Approved by:     Trent Luna MD

## 2023-10-26 ENCOUNTER — OFFICE VISIT (OUTPATIENT)
Dept: RADIATION ONCOLOGY | Facility: HOSPITAL | Age: 48
End: 2023-10-26
Payer: COMMERCIAL

## 2023-10-26 VITALS
HEART RATE: 97 BPM | TEMPERATURE: 98.3 F | WEIGHT: 208.4 LBS | RESPIRATION RATE: 18 BRPM | SYSTOLIC BLOOD PRESSURE: 136 MMHG | OXYGEN SATURATION: 100 % | HEIGHT: 68 IN | DIASTOLIC BLOOD PRESSURE: 75 MMHG | BODY MASS INDEX: 31.58 KG/M2

## 2023-10-26 DIAGNOSIS — C20 RECTAL CANCER: Primary | ICD-10-CM

## 2023-10-26 PROCEDURE — G0463 HOSPITAL OUTPT CLINIC VISIT: HCPCS | Performed by: RADIOLOGY

## 2023-11-14 ENCOUNTER — ON CAMPUS - OUTPATIENT (AMBULATORY)
Dept: URBAN - METROPOLITAN AREA HOSPITAL 2 | Facility: HOSPITAL | Age: 48
End: 2023-11-14
Payer: COMMERCIAL

## 2023-11-14 ENCOUNTER — OFFICE (AMBULATORY)
Dept: URBAN - METROPOLITAN AREA PATHOLOGY 4 | Facility: PATHOLOGY | Age: 48
End: 2023-11-14
Payer: COMMERCIAL

## 2023-11-14 VITALS
DIASTOLIC BLOOD PRESSURE: 66 MMHG | SYSTOLIC BLOOD PRESSURE: 105 MMHG | DIASTOLIC BLOOD PRESSURE: 83 MMHG | DIASTOLIC BLOOD PRESSURE: 63 MMHG | RESPIRATION RATE: 18 BRPM | SYSTOLIC BLOOD PRESSURE: 107 MMHG | OXYGEN SATURATION: 98 % | HEART RATE: 99 BPM | HEART RATE: 104 BPM | SYSTOLIC BLOOD PRESSURE: 148 MMHG | TEMPERATURE: 97.3 F | HEART RATE: 113 BPM | SYSTOLIC BLOOD PRESSURE: 110 MMHG | OXYGEN SATURATION: 97 % | DIASTOLIC BLOOD PRESSURE: 71 MMHG | HEART RATE: 93 BPM | DIASTOLIC BLOOD PRESSURE: 76 MMHG | HEART RATE: 100 BPM | DIASTOLIC BLOOD PRESSURE: 74 MMHG | SYSTOLIC BLOOD PRESSURE: 159 MMHG | RESPIRATION RATE: 12 BRPM | SYSTOLIC BLOOD PRESSURE: 153 MMHG | RESPIRATION RATE: 19 BRPM | DIASTOLIC BLOOD PRESSURE: 75 MMHG | OXYGEN SATURATION: 99 % | HEIGHT: 69 IN | SYSTOLIC BLOOD PRESSURE: 119 MMHG | DIASTOLIC BLOOD PRESSURE: 80 MMHG | RESPIRATION RATE: 17 BRPM | SYSTOLIC BLOOD PRESSURE: 115 MMHG | SYSTOLIC BLOOD PRESSURE: 121 MMHG | RESPIRATION RATE: 16 BRPM | HEART RATE: 95 BPM | WEIGHT: 207 LBS

## 2023-11-14 DIAGNOSIS — Z85.038 PERSONAL HISTORY OF OTHER MALIGNANT NEOPLASM OF LARGE INTEST: ICD-10-CM

## 2023-11-14 DIAGNOSIS — Z08 ENCOUNTER FOR FOLLOW-UP EXAMINATION AFTER COMPLETED TREATMEN: ICD-10-CM

## 2023-11-14 DIAGNOSIS — D12.5 BENIGN NEOPLASM OF SIGMOID COLON: ICD-10-CM

## 2023-11-14 DIAGNOSIS — D12.4 BENIGN NEOPLASM OF DESCENDING COLON: ICD-10-CM

## 2023-11-14 DIAGNOSIS — K63.5 POLYP OF COLON: ICD-10-CM

## 2023-11-14 LAB
GI HISTOLOGY: A. UNSPECIFIED: (no result)
GI HISTOLOGY: B. UNSPECIFIED: (no result)
GI HISTOLOGY: PDF REPORT: (no result)

## 2023-11-14 PROCEDURE — 45380 COLONOSCOPY AND BIOPSY: CPT | Mod: 33,59 | Performed by: INTERNAL MEDICINE

## 2023-11-14 PROCEDURE — 45385 COLONOSCOPY W/LESION REMOVAL: CPT | Mod: 33 | Performed by: INTERNAL MEDICINE

## 2023-11-14 PROCEDURE — 88305 TISSUE EXAM BY PATHOLOGIST: CPT | Performed by: INTERNAL MEDICINE

## 2023-11-16 LAB
T4 FREE SERPL-MCNC: 1.39 NG/DL (ref 0.82–1.77)
TSH SERPL DL<=0.005 MIU/L-ACNC: 2.97 UIU/ML (ref 0.45–4.5)

## 2023-11-27 DIAGNOSIS — F33.1 MAJOR DEPRESSIVE DISORDER, RECURRENT EPISODE, MODERATE: Chronic | ICD-10-CM

## 2023-11-27 DIAGNOSIS — F41.0 GENERALIZED ANXIETY DISORDER WITH PANIC ATTACKS: Chronic | ICD-10-CM

## 2023-11-27 DIAGNOSIS — F41.1 GENERALIZED ANXIETY DISORDER WITH PANIC ATTACKS: Chronic | ICD-10-CM

## 2023-11-27 RX ORDER — BUPROPION HYDROCHLORIDE 150 MG/1
150 TABLET ORAL EVERY MORNING
Qty: 90 TABLET | Refills: 1 | Status: SHIPPED | OUTPATIENT
Start: 2023-11-27

## 2023-11-27 NOTE — TELEPHONE ENCOUNTER
Rx Refill Note  Requested Prescriptions     Pending Prescriptions Disp Refills    buPROPion XL (WELLBUTRIN XL) 150 MG 24 hr tablet 30 tablet 2     Sig: Take 1 tablet by mouth Every Morning.        Last office visit with prescribing clinician: 10/10/2023     Next office visit with prescribing clinician: 12/5/2023     Office Visit with Tarah Tello APRN (10/10/2023)     Ping Quan  11/27/23, 13:17 EST     Asking for a 90 day fill

## 2023-11-28 ENCOUNTER — OFFICE VISIT (OUTPATIENT)
Dept: ENDOCRINOLOGY | Facility: CLINIC | Age: 48
End: 2023-11-28
Payer: COMMERCIAL

## 2023-11-28 VITALS
SYSTOLIC BLOOD PRESSURE: 118 MMHG | OXYGEN SATURATION: 98 % | WEIGHT: 211 LBS | HEART RATE: 85 BPM | BODY MASS INDEX: 31.98 KG/M2 | HEIGHT: 68 IN | DIASTOLIC BLOOD PRESSURE: 58 MMHG

## 2023-11-28 DIAGNOSIS — E66.9 OBESITY WITH SERIOUS COMORBIDITY, UNSPECIFIED CLASSIFICATION, UNSPECIFIED OBESITY TYPE: ICD-10-CM

## 2023-11-28 DIAGNOSIS — I10 PRIMARY HYPERTENSION: ICD-10-CM

## 2023-11-28 DIAGNOSIS — E10.65 TYPE 1 DIABETES MELLITUS WITH HYPERGLYCEMIA: ICD-10-CM

## 2023-11-28 DIAGNOSIS — E89.0 POSTABLATIVE HYPOTHYROIDISM: Primary | ICD-10-CM

## 2023-11-28 DIAGNOSIS — E11.65 TYPE 2 DIABETES MELLITUS WITH HYPERGLYCEMIA, WITHOUT LONG-TERM CURRENT USE OF INSULIN: ICD-10-CM

## 2023-11-28 DIAGNOSIS — E78.5 HYPERLIPIDEMIA, UNSPECIFIED HYPERLIPIDEMIA TYPE: ICD-10-CM

## 2023-11-28 PROCEDURE — 99214 OFFICE O/P EST MOD 30 MIN: CPT | Performed by: INTERNAL MEDICINE

## 2023-11-28 RX ORDER — TIRZEPATIDE 5 MG/.5ML
INJECTION, SOLUTION SUBCUTANEOUS
COMMUNITY
Start: 2023-11-27

## 2023-11-28 RX ORDER — ALPRAZOLAM 0.5 MG/1
TABLET ORAL
COMMUNITY

## 2023-11-28 RX ORDER — BLOOD-GLUCOSE SENSOR
EACH MISCELLANEOUS
COMMUNITY
Start: 2023-09-27

## 2023-11-28 RX ORDER — ACETAMINOPHEN AND CODEINE PHOSPHATE 300; 60 MG/1; MG/1
TABLET ORAL
COMMUNITY

## 2023-11-28 RX ORDER — VILAZODONE HYDROCHLORIDE 20 MG/1
20 TABLET ORAL DAILY
COMMUNITY

## 2023-11-28 RX ORDER — IVERMECTIN 10 MG/G
CREAM TOPICAL
COMMUNITY

## 2023-11-28 RX ORDER — SENNOSIDES 8.6 MG
CAPSULE ORAL
COMMUNITY

## 2023-11-28 RX ORDER — LEVOTHYROXINE SODIUM 125 MCG
125 TABLET ORAL DAILY
Qty: 90 TABLET | Refills: 3 | Status: SHIPPED | OUTPATIENT
Start: 2023-11-28 | End: 2024-11-27

## 2023-11-28 NOTE — PROGRESS NOTES
-----------------------------------------------------------------  ENDOCRINE CLINIC NOTE  -----------------------------------------------------------------        PATIENT NAME: Yun Mcgowan  PATIENT : 1975 AGE: 48 y.o.  MRN NUMBER: [id number]  PRIMARY CARE: Harley De La Cruz MD    ==========================================================================    CHIEF COMPLAINT: Hypothyroidism  DATE OF SERVICE: 23    ==========================================================================    HPI / SUBJECTIVE    48 y.o. 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 Last visit was 2023.  Patient was initially being managed by endocrinology on levothyroxine.  Then patient started following with holistic practitioner and was being managed with [facility].  Patient was initially seen in my clinic in 2023 and was switched from [facility] to levothyroxine therapy.  Currently taking levothyroxine 125 mcg every day.  Tolerating medication without any reported side effects patient was able to tolerate brand-name.    ==========================================================================                                                PAST MEDICAL HISTORY    Past Medical History:   Diagnosis Date    Adenocarcinoma of rectum     Adenoma of liver     Alopecia     Anxiety     Diabetes mellitus     Disease of thyroid gland     GERD (gastroesophageal reflux disease)     Graves disease     Hashimoto's thyroiditis     See records Mohini Hunttt DAYANA    Hyperlipidemia     Hypertension     Hyperthyroidism     See records Dr. Domitila Nguyen    Hypothyroidism     See records Dr. Domitila Nguyen    IBS (irritable bowel syndrome)     Type 2 diabetes mellitus     See records Dr. Domitila Nguyen       ==========================================================================    PAST SURGICAL HISTORY    Past Surgical History:   Procedure Laterality  Date    CATARACT EXTRACTION Right 2023    COLONOSCOPY      ILEOSTOMY      LAPAROSCOPIC OOPHORECTOMY Bilateral        ==========================================================================    FAMILY HISTORY    Family History   Problem Relation Age of Onset    Cancer Maternal Grandmother         Colon cancer;         ==========================================================================    SOCIAL HISTORY    Social History     Socioeconomic History    Marital status:      Spouse name: Ryan    Number of children: 0    Highest education level: Bachelor's degree (e.g., BA, AB, BS)   Tobacco Use    Smoking status: Never    Smokeless tobacco: Never   Vaping Use    Vaping Use: Never used   Substance and Sexual Activity    Alcohol use: Yes     Alcohol/week: 2.0 standard drinks of alcohol     Types: 2 Drinks containing 0.5 oz of alcohol per week     Comment: Socially; max 2 drinks/week    Drug use: Yes     Types: Marijuana     Comment: THC gummies    Sexual activity: Yes     Partners: Male     Comment: No contraception--menopausal now and infertility issues       ==========================================================================    MEDICATIONS      Current Outpatient Medications:     acetaminophen (Tylenol 8 Hour) 650 MG 8 hr tablet, Take 2 tablets every 8 hours by oral route., Disp: , Rfl:     acetaminophen-codeine (TYLENOL with CODEINE #4) 300-60 MG per tablet, tablet, Disp: , Rfl:     ALPRAZolam (XANAX) 0.5 MG tablet, 2, Disp: , Rfl:     AZO CRANBERRY GUMMIES PO,  2 tabs, Oral, Daily, 0 Refill(s), Disp: , Rfl:     B-D ULTRAFINE III SHORT PEN 31G X 8 MM misc, USE TO INJECT INSULIN (1) ONE TO (4 ) FOUR TIMES DAILY E11.9, Disp: , Rfl:     betamethasone dipropionate 0.05 % cream, APPLY TO RIGHT WRIST 4 TO 5 NIGHTS A WEEK AS NEEDED ACTIVE AREAS, Disp: , Rfl:     buPROPion XL (WELLBUTRIN XL) 150 MG 24 hr tablet, Take 1 tablet by mouth Every Morning., Disp: 90 tablet, Rfl: 1     codeine 30 MG tablet, TAKE 1 TABLET BY MOUTH EVERY 6 (SIX) HOURS IF NEEDED FOR MODERATE PAIN FOR UP TO 5 DAYS., Disp: , Rfl:     Continuous Blood Gluc Sensor (FreeStyle Fang 3 Sensor) misc, CHANGE SENSOR EVERY 14 DAYS, Disp: , Rfl:     Dicyclomine HCl (BENTYL IM), , Disp: , Rfl:     fexofenadine (ALLEGRA) 60 MG tablet, Take 1 tablet by mouth Daily., Disp: , Rfl:     gabapentin (NEURONTIN) 300 MG capsule, TAKE 1 CAPSULE BY MOUTH AT BEDTIME FOR 3 DAYS ,THEN TWICE DAILY FOR 3 DAYS, THEN THREE TIMES A DAY, Disp: , Rfl:     HumaLOG KwikPen 200 UNIT/ML solution pen-injector, INJECT 70 UNITS UNDER THE SKIN 3 (THREE) TIMES A DAY WITH MEALS., Disp: , Rfl:     hydrocortisone (ANUSOL-HC) 25 MG suppository, INSERT 1 SUPPOSITORY RECTALLY (25 MG TOTAL) TWICE A DAY FOR 10 DAYS, Disp: , Rfl:     ibuprofen (ADVIL,MOTRIN) 800 MG tablet, Take  by mouth., Disp: , Rfl:     Insulin Degludec (Tresiba FlexTouch) 200 UNIT/ML solution pen-injector pen injection, Inject 80 Units under the skin into the appropriate area as directed Daily., Disp: , Rfl:     Ivermectin (Soolantra) 1 % cream, APPLY A PEA-SIZED AMOUNT BY TOPICAL ROUTE ONCE DAILY TO COVER AREAS OF FACE WITH THIN LAYER AVOIDING THE EYES AND LIPS, Disp: , Rfl:     Lactase (LACTAID PO), , Disp: , Rfl:     lisinopril (PRINIVIL,ZESTRIL) 10 MG tablet, Take 1 tablet by mouth Daily., Disp: , Rfl:     loperamide (IMODIUM) 2 MG capsule, Take 1 capsule by mouth 3 (Three) Times a Day., Disp: , Rfl:     Melatonin 10 MG tablet, Take  by mouth., Disp: , Rfl:     Mounjaro 5 MG/0.5ML solution pen-injector, INJECT 5 MG SUBCUTANEOUSLY EVERY 7 DAYS, Disp: , Rfl:     Ozempic, 1 MG/DOSE, 4 MG/3ML solution pen-injector, INJECT 1 MG SUBCUTANEOUSLY EVERY WEEK, Disp: , Rfl:     pantoprazole (PROTONIX) 40 MG EC tablet, Take 1 tablet by mouth Daily., Disp: , Rfl:     promethazine (PHENERGAN) 25 MG tablet, TAKE 1 TABLET BY MOUTH EVERY 4 TO 6 HOURS AS NEEDED, Disp: , Rfl:     rizatriptan (MAXALT) 10 MG tablet,  Take 1 tablet by mouth 1 (One) Time As Needed for Migraine. May repeat in 2 hours if needed, Disp: , Rfl:     silver sulfadiazine (SILVADENE, SSD) 1 % cream, Apply 1 application topically to the appropriate area as directed 2 (Two) Times a Day., Disp: 50 g, Rfl: 2    Synthroid 125 MCG tablet, Take 1 tablet by mouth Daily., Disp: 90 tablet, Rfl: 3    topiramate (TOPAMAX) 25 MG tablet, Take 1 tablet by mouth Daily., Disp: , Rfl:     vilazodone (VIIBRYD) 20 MG tablet tablet, Take 1 tablet by mouth Daily., Disp: , Rfl:     vitamin B-12 (CYANOCOBALAMIN) 1000 MCG tablet, Take 1 tablet by mouth Daily., Disp: , Rfl:     vitamin D3 125 MCG (5000 UT) capsule capsule, Take 1 capsule by mouth Daily., Disp: , Rfl:     vitamin E 400 UNIT capsule, 1 capsule., Disp: , Rfl:     ==========================================================================    ALLERGIES    Allergies   Allergen Reactions    Adhesive Tape Unknown - Low Severity, Itching and Rash    Wound Dressing Adhesive Itching     Skin gets itchy and irritated       ==========================================================================    OBJECTIVE    Vitals:    11/28/23 1230   BP: 118/58   Pulse: 85   SpO2: 98%       Body mass index is 32.08 kg/m².     General: Alert, cooperative, no acute distress  Thyroid:  no enlargement/tenderness/palpable nodules  Lungs: Clear to auscultation bilaterally, respirations unlabored  Heart: Regular rate and rhythm, S1 and S2 normal, no murmur, rub or gallop  Abdomen: Soft, NT, ND and Bowel sounds Positive  Extremities:  Extremities normal, atraumatic, no cyanosis or edema    ==========================================================================    LAB EVALUATION    Lab Results   Component Value Date    GLUCOSE 206 (H) 10/10/2022    BUN 6 10/10/2022    CREATININE 0.66 10/10/2022    EGFRIFAFRI >60 12/22/2020    BCR 9.1 10/10/2022    K 4.0 10/10/2022    CO2 22.0 10/10/2022    CALCIUM 9.2 10/10/2022    ALBUMIN 4.1 12/22/2020     "LABIL2 1.4 12/22/2020    AST 74 (H) 12/22/2020    ALT 76 (H) 12/22/2020     Lab Results   Component Value Date    HGBA1C 9.2 (H) 11/07/2020     Lab Results   Component Value Date    CREATININE 0.66 10/10/2022     Lab Results   Component Value Date    TSH 2.970 11/15/2023    FREET4 1.39 11/15/2023       ==========================================================================    ASSESSMENT AND PLAN    #Hypothyroidism, post ablative  - Patient is currently maintained on levothyroxine 125 mcg every day, brand-name only  - Thyroid function within acceptable limit  - Plan for repeat thyroid function back again in 3 months in 6 months time  - Follow-up in my clinic back again in 6 months time    #T2DM  #Obesity  - Patient is currently following different physician for type 2 diabetes management  - Patient wants to switch to my care for type 2 diabetes management once the previous physician retires  - Patient reports to have last A1c around 8.7%, following different endocrinology practice    #HLD  #HTN  -Care as per primary team    Return to clinic: 6 months    Entire assessment and plan was discussed and counseled the patient in detail to which patient verbalized understanding and agreed with care.  Answered all queries and concerns.    This note was created using voice recognition software and is inherently subject to errors including those of syntax and \"sound-alike\" substitutions which may escape proofreading.  In such instances, original meaning may be extrapolated by contextual derivation.    Note: Portions of this note may have been copied from previous notes but documentation have been reviewed and edited as necessary to support clinical decision making for today's visit.    ==========================================================================    INFORMATION PROVIDED TO PATIENT    Patient Instructions   Please,    #Levothyroxine Dosing:    - Continue brand-name thyroid / levothyroxine tablets 125 mcg by mouth " daily at 6:00 AM  - Take it every day, on an empty stomach, 30 minutes before eating  - Avoid taking it with iron, calcium, other medications (blocks absorption), wait at least 4 hrs after taking levothyroxine to take these medications  - If you miss a pill, you can take 2 the next day and return to schedule from next day     - Repeat thyroid work-up lab in 3 months and 6 months.    Follow up in 6 months time.    Thank you for your visit today.    If you have any questions or concerns please feel free to reach out of the office.       ==========================================================================  Eitan Aranda MD  Department of Endocrine, Diabetes and Metabolism  [place], [place]  ==========================================================================

## 2023-11-28 NOTE — PATIENT INSTRUCTIONS
Please,    #Levothyroxine Dosing:    - Continue brand-name thyroid / levothyroxine tablets 125 mcg by mouth daily at 6:00 AM  - Take it every day, on an empty stomach, 30 minutes before eating  - Avoid taking it with iron, calcium, other medications (blocks absorption), wait at least 4 hrs after taking levothyroxine to take these medications  - If you miss a pill, you can take 2 the next day and return to schedule from next day     - Repeat thyroid work-up lab in 3 months and 6 months.    Follow up in 6 months time.    Thank you for your visit today.    If you have any questions or concerns please feel free to reach out of the office.

## 2023-12-04 NOTE — PROGRESS NOTES
Subjective   Yun Mcgowan is a 48 y.o. female who presents today for follow-up for psychiatric medication management.     Chief Complaint:  Depression, anxiety     History of Present Illness:     Medication adjustments last visit:  Continue Wellbutrin XL 150mg.   Continue Viibryd, decrease to 20mg.   Genesight testing completed today to possibly switch Viibryd due to side effects.     She states she is having pain today from a pinched nerve in her back. She has a lot of scar tissue in sacrum from her cancer surgery.   She had an MRI yesterday for it.   She is still having stomach issues even on the lower dose of vilazodone.   She is having difficulty sleeping.   She takes 12mg of melatonin. She is still not sleeping well. We discussed adding trazodone for sleep. She is going to think about it and let me know.   She feels like depression is doing ok right now. Her scan came back good so she feels like that was a relief. However she has to have them every 6 months so states there will always be some anxiety associated with that.   Denies any suicidal thoughts.     Patient presents with symptoms and behaviors that are consistent with the following DSM-5 diagnoses:  Major depressive disorder, mod, recurrent  2. Generalized anxiety disorder    The following portions of the patient's history were reviewed and updated as appropriate: allergies, current medications, past family history, past medical history, past social history, past surgical history and problem list.    PAST OUTPATIENT TREATMENT  Diagnosis treated:  Affective Disorder, Anxiety/Panic Disorder  Treatment Type:  Medication Management  Prior Psychiatric Medications:  Xanax - helpful for panic.  Bupropion XL 300mg  Gabapentin  phenteramine - for wt loss  Promethazine - N/V  topomax 25 bid - migraines  Pristiq  Fluoxetine--wasn't helping   Support Groups:  None  Sequelae Of Mental Disorder:  emotional distress    Interval History  Some improvement.     Side  Effects  Diarrhea and headaches      Past Medical History:  Past Medical History:   Diagnosis Date    Adenocarcinoma of rectum     Adenoma of liver     Alopecia     Anxiety     Diabetes mellitus     Disease of thyroid gland     GERD (gastroesophageal reflux disease)     Graves disease     Hashimoto's thyroiditis     See records Mohini ANNE    Hyperlipidemia     Hypertension     Hyperthyroidism 2005    See records Dr. Domitila Nguyen    Hypothyroidism     See records Dr. Domitila Nguyen    IBS (irritable bowel syndrome)     Type 2 diabetes mellitus     See records Dr. Domitila Nguyen       Social History:  Social History     Socioeconomic History    Marital status:      Spouse name: Ryan    Number of children: 0    Highest education level: Bachelor's degree (e.g., BA, AB, BS)   Tobacco Use    Smoking status: Never    Smokeless tobacco: Never   Vaping Use    Vaping Use: Never used   Substance and Sexual Activity    Alcohol use: Yes     Alcohol/week: 2.0 standard drinks of alcohol     Types: 2 Drinks containing 0.5 oz of alcohol per week     Comment: Socially; max 2 drinks/week    Drug use: Yes     Types: Marijuana     Comment: THC gummies    Sexual activity: Yes     Partners: Male     Comment: No contraception--menopausal now and infertility issues       Family History:  Family History   Problem Relation Age of Onset    Cancer Maternal Grandmother         Colon cancer;         Past Surgical History:  Past Surgical History:   Procedure Laterality Date    CATARACT EXTRACTION Right 2023    COLONOSCOPY      ILEOSTOMY      LAPAROSCOPIC OOPHORECTOMY Bilateral        Problem List:  Patient Active Problem List   Diagnosis    Rectal cancer    Benign neoplasm of liver    Diabetes mellitus    Gastroesophageal reflux disease    Hepatic cirrhosis    Hyperlipidemia    Hypertension    Lipodystrophy    Liver mass    Migraine headache    Obesity    Alopecia    Postablative hypothyroidism    Seasonal  allergic rhinitis    Premature atrial contraction    Recurrent major depressive disorder, in partial remission    Generalized anxiety disorder    Adjustment disorder with anxious mood    Major depressive disorder, recurrent episode, moderate    Abnormal liver enzymes    Altered bowel function    Change in bowel habits    Constipation    Diarrhea    Ascites    Encounter for immunization    Fatty liver    Hematochezia    History of rectal cancer    Irritable bowel syndrome    Major depressive disorder with single episode    Melena    Neoplasm of uncertain behavior of colon    Nonalcoholic steatohepatitis (DAMON)    Noncompliance with medication regimen    Personal history of colonic polyps    Personal history of other malignant neoplasm of rectum, rectosigmoid junction, and anus    Polyp of colon    Hemorrhage of anus and rectum    Rectal bleeding    Rosacea    Seasonal allergies    Other asthma    Sleep apnea    Intestinal obstruction    Unspecified intestinal obstruction, unspecified as to partial versus complete obstruction    Thyroid disease    Shoulder pain    Foreign body in stomach    Gastric foreign body    COVID-19 vaccine administered       Allergy:   Allergies   Allergen Reactions    Adhesive Tape Unknown - Low Severity, Itching and Rash    Wound Dressing Adhesive Itching     Skin gets itchy and irritated        Discontinued Medications:  There are no discontinued medications.        Current Medications:   Current Outpatient Medications   Medication Sig Dispense Refill    topiramate (TOPAMAX) 25 MG tablet Take 2 tablets by mouth Daily.      acetaminophen (Tylenol 8 Hour) 650 MG 8 hr tablet Take 2 tablets every 8 hours by oral route.      acetaminophen-codeine (TYLENOL with CODEINE #4) 300-60 MG per tablet tablet      ALPRAZolam (XANAX) 0.5 MG tablet 2      AZO CRANBERRY GUMMIES PO   2 tabs, Oral, Daily, 0 Refill(s)      B-D ULTRAFINE III SHORT PEN 31G X 8 MM misc USE TO INJECT INSULIN (1) ONE TO (4 ) FOUR  TIMES DAILY E11.9      betamethasone dipropionate 0.05 % cream APPLY TO RIGHT WRIST 4 TO 5 NIGHTS A WEEK AS NEEDED ACTIVE AREAS      buPROPion XL (WELLBUTRIN XL) 150 MG 24 hr tablet Take 1 tablet by mouth Every Morning. 90 tablet 1    codeine 30 MG tablet TAKE 1 TABLET BY MOUTH EVERY 6 (SIX) HOURS IF NEEDED FOR MODERATE PAIN FOR UP TO 5 DAYS.      Continuous Blood Gluc Sensor (FreeStyle Fang 3 Sensor) misc CHANGE SENSOR EVERY 14 DAYS      Dicyclomine HCl (BENTYL IM)       fexofenadine (ALLEGRA) 60 MG tablet Take 1 tablet by mouth Daily.      gabapentin (NEURONTIN) 300 MG capsule TAKE 1 CAPSULE BY MOUTH AT BEDTIME FOR 3 DAYS ,THEN TWICE DAILY FOR 3 DAYS, THEN THREE TIMES A DAY      HumaLOG KwikPen 200 UNIT/ML solution pen-injector INJECT 70 UNITS UNDER THE SKIN 3 (THREE) TIMES A DAY WITH MEALS.      hydrocortisone (ANUSOL-HC) 25 MG suppository INSERT 1 SUPPOSITORY RECTALLY (25 MG TOTAL) TWICE A DAY FOR 10 DAYS      ibuprofen (ADVIL,MOTRIN) 800 MG tablet Take  by mouth.      Insulin Degludec (Tresiba FlexTouch) 200 UNIT/ML solution pen-injector pen injection Inject 80 Units under the skin into the appropriate area as directed Daily.      Ivermectin (Soolantra) 1 % cream APPLY A PEA-SIZED AMOUNT BY TOPICAL ROUTE ONCE DAILY TO COVER AREAS OF FACE WITH THIN LAYER AVOIDING THE EYES AND LIPS      Lactase (LACTAID PO)       lisinopril (PRINIVIL,ZESTRIL) 10 MG tablet Take 1 tablet by mouth Daily.      loperamide (IMODIUM) 2 MG capsule Take 1 capsule by mouth 3 (Three) Times a Day.      Melatonin 10 MG tablet Take  by mouth.      Mounjaro 5 MG/0.5ML solution pen-injector INJECT 5 MG SUBCUTANEOUSLY EVERY 7 DAYS      Ozempic, 1 MG/DOSE, 4 MG/3ML solution pen-injector INJECT 1 MG SUBCUTANEOUSLY EVERY WEEK      pantoprazole (PROTONIX) 40 MG EC tablet Take 1 tablet by mouth Daily.      promethazine (PHENERGAN) 25 MG tablet TAKE 1 TABLET BY MOUTH EVERY 4 TO 6 HOURS AS NEEDED      rizatriptan (MAXALT) 10 MG tablet Take 1 tablet by  mouth 1 (One) Time As Needed for Migraine. May repeat in 2 hours if needed      silver sulfadiazine (SILVADENE, SSD) 1 % cream Apply 1 application topically to the appropriate area as directed 2 (Two) Times a Day. 50 g 2    Synthroid 125 MCG tablet Take 1 tablet by mouth Daily. 90 tablet 3    vilazodone (VIIBRYD) 20 MG tablet tablet Take 1 tablet by mouth Daily.      vitamin B-12 (CYANOCOBALAMIN) 1000 MCG tablet Take 1 tablet by mouth Daily.      vitamin D3 125 MCG (5000 UT) capsule capsule Take 1 capsule by mouth Daily.      vitamin E 400 UNIT capsule 1 capsule.       No current facility-administered medications for this visit.         Psychological ROS: positive for - anxiety and depression  negative for - behavioral disorder, concentration difficulties, decreased libido, disorientation, hallucinations, hostility, irritability, memory difficulties, mood swings, obsessive thoughts, physical abuse, sexual abuse, sleep disturbances or suicidal ideation      Physical Exam:   There were no vitals taken for this visit.    Mental Status Exam:   Hygiene:   good  Cooperation:  Cooperative  Eye Contact:  Good  Psychomotor Behavior:  Appropriate  Affect:  Appropriate  Mood: depressed and anxious  Hopelessness: Denies  Speech:  Normal  Thought Process:  Linear  Thought Content:  Normal  Suicidal:  None  Homicidal:  None  Hallucinations:  None  Delusion:  None  Memory:  Intact  Orientation:  Person, Place, Time and Situation  Reliability:  good  Insight:  Good  Judgement:  Good  Impulse Control:  Good  Physical/Medical Issues:  Yes Colorectal cancer       Mental status exam was reviewed and compared to visit on 10/10/23 and appropriate updates were made.     PHQ-9 Depression Screening    Little interest or pleasure in doing things? 0-->not at all   Feeling down, depressed, or hopeless? 1-->several days   Trouble falling or staying asleep, or sleeping too much? 1-->several days   Feeling tired or having little energy?  1-->several days   Poor appetite or overeating? 1-->several days   Feeling bad about yourself - or that you are a failure or have let yourself or your family down? 0-->not at all   Trouble concentrating on things, such as reading the newspaper or watching television? 0-->not at all   Moving or speaking so slowly that other people could have noticed? Or the opposite - being so fidgety or restless that you have been moving around a lot more than usual? 0-->not at all   Thoughts that you would be better off dead, or of hurting yourself in some way? 0-->not at all   PHQ-9 Total Score 4   If you checked off any problems, how difficult have these problems made it for you to do your work, take care of things at home, or get along with other people?          Never smoker    I advised Ondrea of the risks of tobacco use.     Result Review:    Labs:  Orders Only on 11/15/2023   Component Date Value Ref Range Status    Free T4 11/15/2023 1.39  0.82 - 1.77 ng/dL Final    TSH 11/15/2023 2.970  0.450 - 4.500 uIU/mL Final       Assessment & Plan   Diagnoses and all orders for this visit:    1. Major depressive disorder, recurrent episode, moderate (Primary)    2. Generalized anxiety disorder      Continue Wellbutrin XL 150mg.   Continue Viibryd 20mg nightly.       Visit Diagnoses:    ICD-10-CM ICD-9-CM   1. Major depressive disorder, recurrent episode, moderate  F33.1 296.32   2. Generalized anxiety disorder  F41.1 300.02             TREATMENT PLAN/GOALS: Continue supportive psychotherapy efforts and medications as indicated. Treatment and medication options discussed during today's visit. Patient ackowledged and verbally consented to continue with current treatment plan and was educated on the importance of compliance with treatment and follow-up appointments.    MEDICATION ISSUES:  INSPECT reviewed as expected    Discussed medication options and treatment plan of prescribed medication as well as the risks, benefits, and side  effects including potential falls, possible impaired driving and metabolic adversities among others. Patient is agreeable to call the office with any worsening of symptoms or onset of side effects. Patient is agreeable to call 911 or go to the nearest ER should he/she begin having SI/HI. No medication side effects or related complaints today.     MEDS ORDERED DURING VISIT:  No orders of the defined types were placed in this encounter.      Return in about 8 weeks (around 1/30/2024).         This document has been electronically signed by DAYANA Lynch  December 5, 2023 10:51 EST    Part of this note may be an electronic transcription/translation of spoken language to printed text using the Dragon Dictation System.

## 2023-12-05 ENCOUNTER — OFFICE VISIT (OUTPATIENT)
Dept: PSYCHIATRY | Facility: CLINIC | Age: 48
End: 2023-12-05
Payer: COMMERCIAL

## 2023-12-05 DIAGNOSIS — F33.1 MAJOR DEPRESSIVE DISORDER, RECURRENT EPISODE, MODERATE: Primary | Chronic | ICD-10-CM

## 2023-12-05 DIAGNOSIS — F41.1 GENERALIZED ANXIETY DISORDER: Chronic | ICD-10-CM

## 2023-12-06 ENCOUNTER — OFFICE VISIT (OUTPATIENT)
Dept: PSYCHIATRY | Facility: CLINIC | Age: 48
End: 2023-12-06
Payer: COMMERCIAL

## 2023-12-06 DIAGNOSIS — F41.1 GENERALIZED ANXIETY DISORDER: ICD-10-CM

## 2023-12-06 DIAGNOSIS — F33.1 MAJOR DEPRESSIVE DISORDER, RECURRENT EPISODE, MODERATE: Primary | ICD-10-CM

## 2023-12-06 NOTE — PROGRESS NOTES
"Date: December 6, 2023  Time In: 1109  Time Out: 1206      PROGRESS NOTE  Data:  Yun Mcgowan is a 48 y.o. female who presents today for individual therapy session at [facility] with NARCISO Woodruff, MARLENE.     Patient Chief Complaint: Follow-up for anxiety and depression    Clinical Maneuvering/Intervention: Yun is pleasant, alert and oriented to person place and time.  She states that she had a scan which was all clear and while this relieved a great deal of anxiety she knows that the next time a scan is needed that this anxiety will return.  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  They are also going to see stand up comedies, movies and she has a new friend Fartun.  She does find it difficult to do self-care at home other than to read.  She also spoke of her relationship with her mother-in-law.  She states that her mother-in-law is devaluing and belittling, she does not appreciate her , and describes her as somebody who is selfish.  She states that her  is very \"sweet\" and that he often gets into his mother's maladaptive behaviors.    Assisted patient in processing above session content; acknowledged and normalized patient’s thoughts, feelings, and concerns. Rationalized patient thought process regarding setting boundaries with family members. Discussed triggers associated with patient's anxiety. Also discussed coping skills for patient to implement such as continuing with self stabilization Skills.    Allowed patient to freely discuss issues without interruption or judgment. Provided safe, confidential environment to facilitate the development of positive therapeutic relationship and encourage open, honest communication. Assisted patient in identifying risk factors which would indicate the need for higher level of care including thoughts to harm self or others and/or self-harming " behavior and encouraged patient to contact this office, call 911, or present to the nearest emergency room should any of these events occur. Discussed crisis intervention services and means to access. Patient adamantly and convincingly denies current suicidal or homicidal ideation or perceptual disturbance.    Assessment   Patient appears to maintain relative stability as compared to their baseline. However, patient continues to struggle with depression and anxiety which continues to cause impairment in important areas of functioning. A result, they can be reasonably expected to continue to benefit from treatment and would likely be at increased risk for decompensation otherwise.    Mental Status Exam:   Hygiene:   good  Cooperation:  Cooperative  Eye Contact:  Good  Psychomotor Behavior:  Appropriate  Affect:  Appropriate  Mood: Tearful at times   speech:  Normal  Thought Process:  Goal directed and Linear  Thought Content:  Normal  Suicidal:  None  Homicidal:  None  Hallucinations:  None  Delusion:  None  Memory:  Intact  Orientation:  Person, Place, Time and Situation  Reliability:  good  Insight:  Good  Judgement:  Good  Impulse Control:  Good  Physical/Medical Issues:  See diagnosis list     PHQ-Score Total:  PHQ-9 Total Score: PHQ-9 Depression Screening  Little interest or pleasure in doing things? 0-->not at all   Feeling down, depressed, or hopeless? 0-->not at all   Trouble falling or staying asleep, or sleeping too much? 1-->several days   Feeling tired or having little energy? 1-->several days   Poor appetite or overeating? 1-->several days   Feeling bad about yourself - or that you are a failure or have let yourself or your family down? 0-->not at all   Trouble concentrating on things, such as reading the newspaper or watching television? 1-->several days   Moving or speaking so slowly that other people could have noticed? Or the opposite - being so fidgety or restless that you have been moving around a  lot more than usual? 0-->not at all   Thoughts that you would be better off dead, or of hurting yourself in some way? 0-->not at all   PHQ-9 Total Score 4   If you checked off any problems, how difficult have these problems made it for you to do your work, take care of things at home, or get along with other people?        MARTINE-7 Total Score:   Over the last two weeks, how often have you been bothered by the following problems?  Feeling nervous, anxious or on edge: Several days  Not being able to stop or control worrying: Not at all  Worrying too much about different things: Several days  Trouble Relaxing: Several days  Being so restless that it is hard to sit still: Not at all  Becoming easily annoyed or irritable: Several days  Feeling afraid as if something awful might happen: Not at all  MARTINE 7 Total Score: 4     Includes:  significant other     Functional Status: mild impairment      Progress toward goal: Not at goal     Prognosis: Good with Ongoing Treatment          Plan     Resources: Patient was provided with the following community resources: None at this time    Patient will continue in individual outpatient therapy with focus on improved functioning and coping skills, maintaining stability, and avoiding decompensation and the need for higher level of care.    Patient will adhere to any medication regimens as prescribed and report any side effects. Patient will contact this office, call 911 or present to the nearest emergency room should suicidal or homicidal ideations occur. Provide cognitive behavioral therapy and solution focused therapy to improve functioning, maintain stability and avoid decompensation and the need for higher level of care.     Return at first available appointment or earlier if symptoms worsen or fail to improve.           VISIT DIAGNOSIS:     ICD-10-CM ICD-9-CM   1. Major depressive disorder, recurrent episode, moderate  F33.1 296.32   2. Generalized anxiety disorder  F41.1 300.02             This document has been electronically signed by NARCISO Woodruff, CHERIEW   December 6, 2023 12:13 EST      Part of this note may be an electronic transcription/translation of spoken language to printed text using the Dragon Dictation System.

## 2024-01-11 ENCOUNTER — TELEPHONE (OUTPATIENT)
Dept: ONCOLOGY | Facility: CLINIC | Age: 49
End: 2024-01-11

## 2024-01-11 NOTE — TELEPHONE ENCOUNTER
Caller: Yun Mcgowan    Relationship to patient: Self    Best call back number: [phone]    Chief complaint: NEW [facility] PATIENT     Type of visit: PORT FLUSH, NEW APPT & LABS     Requested date: CALL TO [facility]     If rescheduling, when is the original appointment: N/A     Additional notes:TRIED TO W/T NO ANSWER

## 2024-01-17 DIAGNOSIS — F41.1 GENERALIZED ANXIETY DISORDER: ICD-10-CM

## 2024-01-17 DIAGNOSIS — F33.1 MAJOR DEPRESSIVE DISORDER, RECURRENT, MODERATE: ICD-10-CM

## 2024-01-17 RX ORDER — VILAZODONE HYDROCHLORIDE 20 MG/1
20 TABLET ORAL DAILY
Qty: 30 TABLET | Refills: 0 | Status: SHIPPED | OUTPATIENT
Start: 2024-01-17

## 2024-01-22 NOTE — PROGRESS NOTES
Subjective   Yun Mcgowan is a 48 y.o. female who presents today for follow-up for psychiatric medication management.     Chief Complaint:  Depression, anxiety     History of Present Illness:     Medication adjustments last visit:  Continue Wellbutrin XL 150mg.   Continue Viibryd 20mg nightly.     She is still having some anxiety, and overwhemed at times.   We discussed increasing vilazodone to 30mg. She was agreeable to this. She had tried 40mg before, and thought it was causing headaches, but she still has the headaches, even after decreasing vilazodone.   Denies any suicidal thoughts.        Patient presents with symptoms and behaviors that are consistent with the following DSM-5 diagnoses:  Major depressive disorder, mod, recurrent  2. Generalized anxiety disorder    The following portions of the patient's history were reviewed and updated as appropriate: allergies, current medications, past family history, past medical history, past social history, past surgical history and problem list.    PAST OUTPATIENT TREATMENT  Diagnosis treated:  Affective Disorder, Anxiety/Panic Disorder  Treatment Type:  Medication Management  Prior Psychiatric Medications:  Xanax - helpful for panic.  Bupropion XL 300mg  Gabapentin  phenteramine - for wt loss  Promethazine - N/V  topomax 25 bid - migraines  Pristiq  Fluoxetine--wasn't helping   Support Groups:  None  Sequelae Of Mental Disorder:  emotional distress    Interval History  Some improvement.     Side Effects  Diarrhea and headaches      Past Medical History:  Past Medical History:   Diagnosis Date    Adenocarcinoma of rectum     Adenoma of liver     Alopecia     Anxiety     Diabetes mellitus     Disease of thyroid gland     GERD (gastroesophageal reflux disease)     Graves disease     Hashimoto's thyroiditis 2020    See records Mohini ANNE    Hyperlipidemia     Hypertension     Hyperthyroidism 2005    See records Dr. Domitila Nguyen    Hypothyroidism     See records   Domitila Nguyen    IBS (irritable bowel syndrome)     Type 2 diabetes mellitus     See records Dr. Domitila Nguyen       Social History:  Social History     Socioeconomic History    Marital status:      Spouse name: Ryan    Number of children: 0    Highest education level: Bachelor's degree (e.g., BA, AB, BS)   Tobacco Use    Smoking status: Never    Smokeless tobacco: Never   Vaping Use    Vaping Use: Never used   Substance and Sexual Activity    Alcohol use: Yes     Alcohol/week: 2.0 standard drinks of alcohol     Types: 2 Drinks containing 0.5 oz of alcohol per week     Comment: Socially; max 2 drinks/week    Drug use: Yes     Types: Marijuana     Comment: THC gummies    Sexual activity: Yes     Partners: Male     Comment: No contraception--menopausal now and infertility issues       Family History:  Family History   Problem Relation Age of Onset    Cancer Maternal Grandmother         Colon cancer;         Past Surgical History:  Past Surgical History:   Procedure Laterality Date    CATARACT EXTRACTION Right 2023    COLONOSCOPY      ILEOSTOMY      LAPAROSCOPIC OOPHORECTOMY Bilateral        Problem List:  Patient Active Problem List   Diagnosis    Rectal cancer    Benign neoplasm of liver    Diabetes mellitus    Gastroesophageal reflux disease    Hepatic cirrhosis    Hyperlipidemia    Hypertension    Lipodystrophy    Liver mass    Migraine headache    Obesity    Alopecia    Postablative hypothyroidism    Seasonal allergic rhinitis    Premature atrial contraction    Recurrent major depressive disorder, in partial remission    Generalized anxiety disorder    Adjustment disorder with anxious mood    Major depressive disorder, recurrent episode, moderate    Abnormal liver enzymes    Altered bowel function    Change in bowel habits    Constipation    Diarrhea    Ascites    Encounter for immunization    Fatty liver    Hematochezia    History of rectal cancer    Irritable bowel syndrome    Major  depressive disorder with single episode    Melena    Neoplasm of uncertain behavior of colon    Nonalcoholic steatohepatitis (DAMON)    Noncompliance with medication regimen    Personal history of colonic polyps    Personal history of other malignant neoplasm of rectum, rectosigmoid junction, and anus    Polyp of colon    Hemorrhage of anus and rectum    Rectal bleeding    Rosacea    Seasonal allergies    Other asthma    Sleep apnea    Intestinal obstruction    Unspecified intestinal obstruction, unspecified as to partial versus complete obstruction    Thyroid disease    Shoulder pain    Foreign body in stomach    Gastric foreign body    COVID-19 vaccine administered       Allergy:   Allergies   Allergen Reactions    Adhesive Tape Unknown - Low Severity, Itching and Rash    Wound Dressing Adhesive Itching     Skin gets itchy and irritated        Discontinued Medications:  There are no discontinued medications.        Current Medications:   Current Outpatient Medications   Medication Sig Dispense Refill    acetaminophen (Tylenol 8 Hour) 650 MG 8 hr tablet Take 2 tablets every 8 hours by oral route.      acetaminophen-codeine (TYLENOL with CODEINE #4) 300-60 MG per tablet tablet      ALPRAZolam (XANAX) 0.5 MG tablet 2      AZO CRANBERRY GUMMIES PO   2 tabs, Oral, Daily, 0 Refill(s)      B-D ULTRAFINE III SHORT PEN 31G X 8 MM misc USE TO INJECT INSULIN (1) ONE TO (4 ) FOUR TIMES DAILY E11.9      betamethasone dipropionate 0.05 % cream APPLY TO RIGHT WRIST 4 TO 5 NIGHTS A WEEK AS NEEDED ACTIVE AREAS      buPROPion XL (WELLBUTRIN XL) 150 MG 24 hr tablet Take 1 tablet by mouth Every Morning. 90 tablet 1    codeine 30 MG tablet TAKE 1 TABLET BY MOUTH EVERY 6 (SIX) HOURS IF NEEDED FOR MODERATE PAIN FOR UP TO 5 DAYS.      Continuous Blood Gluc Sensor (FreeStyle Fang 3 Sensor) misc CHANGE SENSOR EVERY 14 DAYS      Dicyclomine HCl (BENTYL IM)       fexofenadine (ALLEGRA) 60 MG tablet Take 1 tablet by mouth Daily.       gabapentin (NEURONTIN) 300 MG capsule TAKE 1 CAPSULE BY MOUTH AT BEDTIME FOR 3 DAYS ,THEN TWICE DAILY FOR 3 DAYS, THEN THREE TIMES A DAY      HumaLOG KwikPen 200 UNIT/ML solution pen-injector INJECT 70 UNITS UNDER THE SKIN 3 (THREE) TIMES A DAY WITH MEALS.      hydrocortisone (ANUSOL-HC) 25 MG suppository INSERT 1 SUPPOSITORY RECTALLY (25 MG TOTAL) TWICE A DAY FOR 10 DAYS      ibuprofen (ADVIL,MOTRIN) 800 MG tablet Take  by mouth.      Insulin Degludec (Tresiba FlexTouch) 200 UNIT/ML solution pen-injector pen injection Inject 80 Units under the skin into the appropriate area as directed Daily.      Ivermectin (Soolantra) 1 % cream APPLY A PEA-SIZED AMOUNT BY TOPICAL ROUTE ONCE DAILY TO COVER AREAS OF FACE WITH THIN LAYER AVOIDING THE EYES AND LIPS      Lactase (LACTAID PO)       lisinopril (PRINIVIL,ZESTRIL) 10 MG tablet Take 1 tablet by mouth Daily.      loperamide (IMODIUM) 2 MG capsule Take 1 capsule by mouth 3 (Three) Times a Day.      Melatonin 10 MG tablet Take  by mouth.      Mounjaro 5 MG/0.5ML solution pen-injector INJECT 5 MG SUBCUTANEOUSLY EVERY 7 DAYS      Ozempic, 1 MG/DOSE, 4 MG/3ML solution pen-injector INJECT 1 MG SUBCUTANEOUSLY EVERY WEEK      pantoprazole (PROTONIX) 40 MG EC tablet Take 1 tablet by mouth Daily.      promethazine (PHENERGAN) 25 MG tablet TAKE 1 TABLET BY MOUTH EVERY 4 TO 6 HOURS AS NEEDED      rizatriptan (MAXALT) 10 MG tablet Take 1 tablet by mouth 1 (One) Time As Needed for Migraine. May repeat in 2 hours if needed      silver sulfadiazine (SILVADENE, SSD) 1 % cream Apply 1 application topically to the appropriate area as directed 2 (Two) Times a Day. 50 g 2    Synthroid 125 MCG tablet Take 1 tablet by mouth Daily. 90 tablet 3    topiramate (TOPAMAX) 25 MG tablet Take 2 tablets by mouth Daily.      vilazodone (VIIBRYD) 20 MG tablet tablet TAKE 1 TABLET BY MOUTH EVERY DAY 30 tablet 0    vitamin B-12 (CYANOCOBALAMIN) 1000 MCG tablet Take 1 tablet by mouth Daily.      vitamin D3 125  MCG (5000 UT) capsule capsule Take 1 capsule by mouth Daily.      vitamin E 400 UNIT capsule 1 capsule.       No current facility-administered medications for this visit.         Psychological ROS: positive for - anxiety and depression  negative for - behavioral disorder, concentration difficulties, decreased libido, disorientation, hallucinations, hostility, irritability, memory difficulties, mood swings, obsessive thoughts, physical abuse, sexual abuse, sleep disturbances or suicidal ideation      Physical Exam:   There were no vitals taken for this visit.    Mental Status Exam:   Hygiene:   good  Cooperation:  Cooperative  Eye Contact:  Good  Psychomotor Behavior:  Appropriate  Affect:  Appropriate  Mood: depressed and anxious  Hopelessness: Denies  Speech:  Normal  Thought Process:  Linear  Thought Content:  Normal  Suicidal:  None  Homicidal:  None  Hallucinations:  None  Delusion:  None  Memory:  Intact  Orientation:  Person, Place, Time and Situation  Reliability:  good  Insight:  Good  Judgement:  Good  Impulse Control:  Good  Physical/Medical Issues:  Yes Colorectal cancer       Mental status exam was reviewed and compared to visit on 12/5/23 and appropriate updates were made.     PHQ-9 Depression Screening    Little interest or pleasure in doing things? 1-->several days   Feeling down, depressed, or hopeless? 0-->not at all   Trouble falling or staying asleep, or sleeping too much? 1-->several days   Feeling tired or having little energy? 1-->several days   Poor appetite or overeating? 1-->several days   Feeling bad about yourself - or that you are a failure or have let yourself or your family down? 0-->not at all   Trouble concentrating on things, such as reading the newspaper or watching television? 1-->several days   Moving or speaking so slowly that other people could have noticed? Or the opposite - being so fidgety or restless that you have been moving around a lot more than usual? 0-->not at all    Thoughts that you would be better off dead, or of hurting yourself in some way? 0-->not at all   PHQ-9 Total Score 5   If you checked off any problems, how difficult have these problems made it for you to do your work, take care of things at home, or get along with other people? somewhat difficult        Never smoker    I advised Ondrea of the risks of tobacco use.     Result Review:    Labs:  Orders Only on 11/15/2023   Component Date Value Ref Range Status    Free T4 11/15/2023 1.39  0.82 - 1.77 ng/dL Final    TSH 11/15/2023 2.970  0.450 - 4.500 uIU/mL Final       Assessment & Plan   Diagnoses and all orders for this visit:    1. Major depressive disorder, recurrent, moderate (Primary)    2. Generalized anxiety disorder      Continue Wellbutrin XL 150mg.   Continue Viibryd, increase to 40mg.       Visit Diagnoses:    ICD-10-CM ICD-9-CM   1. Major depressive disorder, recurrent, moderate  F33.1 296.32   2. Generalized anxiety disorder  F41.1 300.02               TREATMENT PLAN/GOALS: Continue supportive psychotherapy efforts and medications as indicated. Treatment and medication options discussed during today's visit. Patient ackowledged and verbally consented to continue with current treatment plan and was educated on the importance of compliance with treatment and follow-up appointments.    MEDICATION ISSUES:  INSPECT reviewed as expected    Discussed medication options and treatment plan of prescribed medication as well as the risks, benefits, and side effects including potential falls, possible impaired driving and metabolic adversities among others. Patient is agreeable to call the office with any worsening of symptoms or onset of side effects. Patient is agreeable to call 911 or go to the nearest ER should he/she begin having SI/HI. No medication side effects or related complaints today.     MEDS ORDERED DURING VISIT:  No orders of the defined types were placed in this encounter.      No follow-ups on  file.         This document has been electronically signed by DAYANA Lynch  January 23, 2024 16:42 EST    Part of this note may be an electronic transcription/translation of spoken language to printed text using the Dragon Dictation System.

## 2024-01-23 ENCOUNTER — OFFICE VISIT (OUTPATIENT)
Dept: PSYCHIATRY | Facility: CLINIC | Age: 49
End: 2024-01-23
Payer: COMMERCIAL

## 2024-01-23 DIAGNOSIS — F33.1 MAJOR DEPRESSIVE DISORDER, RECURRENT, MODERATE: Primary | Chronic | ICD-10-CM

## 2024-01-23 DIAGNOSIS — F41.1 GENERALIZED ANXIETY DISORDER: Chronic | ICD-10-CM

## 2024-01-23 PROCEDURE — 99214 OFFICE O/P EST MOD 30 MIN: CPT

## 2024-02-06 ENCOUNTER — OFFICE VISIT (OUTPATIENT)
Dept: PSYCHIATRY | Facility: CLINIC | Age: 49
End: 2024-02-06
Payer: COMMERCIAL

## 2024-02-06 DIAGNOSIS — F33.1 MAJOR DEPRESSIVE DISORDER, RECURRENT, MODERATE: Primary | ICD-10-CM

## 2024-02-06 DIAGNOSIS — F41.1 GENERALIZED ANXIETY DISORDER: ICD-10-CM

## 2024-02-06 PROCEDURE — 90834 PSYTX W PT 45 MINUTES: CPT | Performed by: SOCIAL WORKER

## 2024-02-06 NOTE — PROGRESS NOTES
Date: February 6, 2024  Time In: 1309  Time Out: 1356      PROGRESS NOTE  Data:  Yun Mcgowan is a 48 y.o. female who presents today for individual therapy session at [facility] with NARCISO Woodruff, MARLENE.     Patient Chief Complaint: Follow-up for depression and anxiety    Clinical Maneuvering/Intervention: Yun is pleasant, alert and oriented to person place and time.  She spoke about being very overwhelmed and her anxiety being increased especially after she had found that she was being denied for the continuation of her long-term care insurance.  In addition, she is having some stomach GI issues that has increased her anxiety for fear that her cancer is back.    Assisted patient in processing above session content; acknowledged and normalized patient’s thoughts, feelings, and concerns. Rationalized patient thought process regarding the normalcy of concerned when a person has a history of cancer and different body sensations and/or pain. Discussed triggers associated with patient's anxiety. Also discussed coping skills for patient to implement such as continuing with skills already built.  Concentrating on emotional self-care with particular attention to gratitude and positive affirmations when anxious.  Breaking down tasks into smaller tasks or making small list in order to be successful in getting things done.    Allowed patient to freely discuss issues without interruption or judgment. Provided safe, confidential environment to facilitate the development of positive therapeutic relationship and encourage open, honest communication. Assisted patient in identifying risk factors which would indicate the need for higher level of care including thoughts to harm self or others and/or self-harming behavior and encouraged patient to contact this office, call 911, or present to the nearest emergency room should any of these events occur. Discussed crisis intervention services and means to  access. Patient adamantly and convincingly denies current suicidal or homicidal ideation or perceptual disturbance.    Assessment   Patient appears to maintain relative stability as compared to their baseline. However, patient continues to struggle with depression and anxiety which continues to cause impairment in important areas of functioning. A result, they can be reasonably expected to continue to benefit from treatment and would likely be at increased risk for decompensation otherwise.    Mental Status Exam:   Hygiene:   good  Cooperation:  Cooperative  Eye Contact:  Good  Psychomotor Behavior:  Appropriate  Affect:  Appropriate  Mood: Tearful at times   speech:  Normal  Thought Process:  Goal directed and Linear  Thought Content:  Normal  Suicidal:  None  Homicidal:  None  Hallucinations:  None  Delusion:  None  Memory:  Intact  Orientation:  Person, Place, Time and Situation  Reliability:  good  Insight:  Good  Judgement:  Good  Impulse Control:  Good  Physical/Medical Issues:  See diagnosis list   PHQ-Score Total:  PHQ-9 Total Score: PHQ-9 Depression Screening  Little interest or pleasure in doing things?     Feeling down, depressed, or hopeless?     Trouble falling or staying asleep, or sleeping too much?     Feeling tired or having little energy?     Poor appetite or overeating?     Feeling bad about yourself - or that you are a failure or have let yourself or your family down?     Trouble concentrating on things, such as reading the newspaper or watching television?     Moving or speaking so slowly that other people could have noticed? Or the opposite - being so fidgety or restless that you have been moving around a lot more than usual?     Thoughts that you would be better off dead, or of hurting yourself in some way?     PHQ-9 Total Score     If you checked off any problems, how difficult have these problems made it for you to do your work, take care of things at home, or get along with other people?         MARTINE-7 Total Score:         Includes:  significant other     Functional Status: mild impairment      Progress toward goal: Not at goal     Prognosis: Good with Ongoing Treatment                Plan     Resources: Patient was provided with the following community resources: None at this time     Patient will continue in individual outpatient therapy with focus on improved functioning and coping skills, maintaining stability, and avoiding decompensation and the need for higher level of care.    Patient will adhere to any medication regimens as prescribed and report any side effects. Patient will contact this office, call 911 or present to the nearest emergency room should suicidal or homicidal ideations occur. Provide cognitive behavioral therapy and solution focused therapy to improve functioning, maintain stability and avoid decompensation and the need for higher level of care.     Return at first available appointment or earlier if symptoms worsen or fail to improve.           VISIT DIAGNOSIS:     ICD-10-CM ICD-9-CM   1. Major depressive disorder, recurrent, moderate  F33.1 296.32   2. Generalized anxiety disorder  F41.1 300.02            This document has been electronically signed by NARCISO Woodruff, MARLENE   February 6, 2024 14:01 EST      Part of this note may be an electronic transcription/translation of spoken language to printed text using the Dragon Dictation System.

## 2024-02-07 NOTE — PROGRESS NOTES
RADIATION ONCOLOGY FOLLOW-UP NOTE    NAME: Yun Mcgowan  YOB: 1975  MRN #: [id number]  DATE OF SERVICE: 2/9/2024  REFERRING PROVIDER: Harley De La Cruz MD  [address] [place] [address]  PRIMARY CARE PROVIDER: Harley De La Cruz MD    DIAGNOSIS:  Locally advanced rectal adenocarcinoma  -JESSICA chemo followed by chemoXRT  -[id number] at time of resection    REASON FOR VISIT/CC:  Rectal cancer, 3M F/U    RADIATION TREATMENT COURSE:  4500 cGy in 25 fractions to pelvis, followed by 540 cGy in 3 fractions boost, completed 10/26/2022.     HISTORY OF PRESENT ILLNESS: The patient is a 48 y.o. year old female who was last seen in our office on 10/26/2023. The plan was to continue following closely with Dr. Napoles, and follow up here in early January 2024.     She follows with Dr. Napoles but has not been seen over the interval--patient to set-up March/April.     The patient was following with Dr. Frances, but has transferred care to Dr. Tabor, and was seen yesterday in consultation.  No imaging--Dr. Tabor has ordered for [facility] by the end of this month.    She has been having intermittent bouts of alternating constipation/diarrhea.  Some bleeding during constipation.  She had a full colonoscopy 12/2023 at [facility]--we need that full report sent to us.    She denies any issues with urination.    The following portions of the patient's history were reviewed and updated as appropriate: allergies, current medications, past family history, past medical history, past social history, past surgical history and problem list. Reviewed with the patient and remain unchanged.    PAST MEDICAL HISTORY:  she has a past medical history of Adenocarcinoma of rectum, Adenoma of liver, Alopecia, Anxiety, Diabetes mellitus, Disease of thyroid gland, GERD (gastroesophageal reflux disease), Graves disease, Hashimoto's thyroiditis (2020), Hyperlipidemia, Hypertension, Hyperthyroidism (2005), Hypothyroidism, IBS  (irritable bowel syndrome), and Type 2 diabetes mellitus (2000).    MEDICATIONS:    Current Outpatient Medications:     acetaminophen (Tylenol 8 Hour) 650 MG 8 hr tablet, Take 2 tablets every 8 hours by oral route., Disp: , Rfl:     acetaminophen-codeine (TYLENOL with CODEINE #4) 300-60 MG per tablet, tablet, Disp: , Rfl:     ALPRAZolam (XANAX) 0.5 MG tablet, 2, Disp: , Rfl:     AZO CRANBERRY GUMMIES PO,  2 tabs, Oral, Daily, 0 Refill(s), Disp: , Rfl:     B-D ULTRAFINE III SHORT PEN 31G X 8 MM misc, USE TO INJECT INSULIN (1) ONE TO (4 ) FOUR TIMES DAILY E11.9, Disp: , Rfl:     betamethasone dipropionate 0.05 % cream, APPLY TO RIGHT WRIST 4 TO 5 NIGHTS A WEEK AS NEEDED ACTIVE AREAS, Disp: , Rfl:     buPROPion XL (WELLBUTRIN XL) 150 MG 24 hr tablet, Take 1 tablet by mouth Every Morning., Disp: 90 tablet, Rfl: 1    codeine 30 MG tablet, TAKE 1 TABLET BY MOUTH EVERY 6 (SIX) HOURS IF NEEDED FOR MODERATE PAIN FOR UP TO 5 DAYS., Disp: , Rfl:     Continuous Blood Gluc Sensor (FreeStyle Fang 3 Sensor) [facility], CHANGE SENSOR EVERY 14 DAYS, Disp: , Rfl:     Dicyclomine HCl (BENTYL IM), , Disp: , Rfl:     fexofenadine (ALLEGRA) 60 MG tablet, Take 1 tablet by mouth Daily., Disp: , Rfl:     gabapentin (NEURONTIN) 300 MG capsule, TAKE 1 CAPSULE BY MOUTH AT BEDTIME FOR 3 DAYS ,THEN TWICE DAILY FOR 3 DAYS, THEN THREE TIMES A DAY, Disp: , Rfl:     HumaLOG KwikPen 200 UNIT/ML solution pen-injector, INJECT 70 UNITS UNDER THE SKIN 3 (THREE) TIMES A DAY WITH MEALS., Disp: , Rfl:     hydrocortisone (ANUSOL-HC) 25 MG suppository, INSERT 1 SUPPOSITORY RECTALLY (25 MG TOTAL) TWICE A DAY FOR 10 DAYS, Disp: , Rfl:     ibuprofen (ADVIL,MOTRIN) 800 MG tablet, Take  by mouth., Disp: , Rfl:     Insulin Degludec (Tresiba FlexTouch) 200 UNIT/ML solution pen-injector pen injection, Inject 80 Units under the skin into the appropriate area as directed Daily., Disp: , Rfl:     Ivermectin (Soolantra) 1 % cream, APPLY A PEA-SIZED AMOUNT BY TOPICAL ROUTE  ONCE DAILY TO COVER AREAS OF FACE WITH THIN LAYER AVOIDING THE EYES AND LIPS, Disp: , Rfl:     Lactase (LACTAID PO), , Disp: , Rfl:     lisinopril (PRINIVIL,ZESTRIL) 10 MG tablet, Take 1 tablet by mouth Daily., Disp: , Rfl:     loperamide (IMODIUM) 2 MG capsule, Take 1 capsule by mouth 3 (Three) Times a Day., Disp: , Rfl:     Melatonin 10 MG tablet, Take  by mouth., Disp: , Rfl:     Mounjaro 5 MG/0.5ML solution pen-injector, INJECT 5 MG SUBCUTANEOUSLY EVERY 7 DAYS, Disp: , Rfl:     pantoprazole (PROTONIX) 40 MG EC tablet, Take 1 tablet by mouth Daily., Disp: , Rfl:     promethazine (PHENERGAN) 25 MG tablet, TAKE 1 TABLET BY MOUTH EVERY 4 TO 6 HOURS AS NEEDED, Disp: , Rfl:     rizatriptan (MAXALT) 10 MG tablet, Take 1 tablet by mouth 1 (One) Time As Needed for Migraine. May repeat in 2 hours if needed, Disp: , Rfl:     silver sulfadiazine (SILVADENE, SSD) 1 % cream, Apply 1 application topically to the appropriate area as directed 2 (Two) Times a Day., Disp: 50 g, Rfl: 2    Synthroid 125 MCG tablet, Take 1 tablet by mouth Daily., Disp: 90 tablet, Rfl: 3    vilazodone (VIIBRYD) 20 MG tablet tablet, TAKE 1 TABLET BY MOUTH EVERY DAY, Disp: 30 tablet, Rfl: 0    vitamin B-12 (CYANOCOBALAMIN) 1000 MCG tablet, Take 1 tablet by mouth Daily., Disp: , Rfl:     vitamin D3 125 MCG (5000 UT) capsule capsule, Take 1 capsule by mouth Daily., Disp: , Rfl:     vitamin E 400 UNIT capsule, 1 capsule., Disp: , Rfl:     ALLERGIES:    Allergies   Allergen Reactions    Adhesive Tape Unknown - Low Severity, Itching and Rash    Wound Dressing Adhesive Itching     Skin gets itchy and irritated       PAST SURGICAL HISTORY:  she has a past surgical history that includes Colonoscopy; Laparoscopic oophorectomy (Bilateral); Cataract extraction (Right, 07/24/2023); and Ileostomy (2022).    PREVIOUS RADIOTHERAPY OR CHEMOTHERAPY:  yes    FAMILY HISTORY:  herfamily history includes Cancer in her maternal grandmother.    SOCIAL HISTORY:  she reports  that she has never smoked. She has never used smokeless tobacco. She reports current alcohol use of about 2.0 standard drinks of alcohol per week. She reports current drug use. Drug: Marijuana.    PAIN AND PAIN MANAGEMENT:  denies pain.    NUTRITIONAL STATUS: no issues    KPS:  90:  Minor signs or symptoms      REVIEW OF SYSTEMS:   General: No fevers, chills, weight change, or drenching night sweats. Skin: No rashes or jaundice.  HEENT: No change in vision or hearing, no headaches.  Neck: No dysphagia or masses.  Heme/Lymph: No easy bruising or bleeding.  Respiratory System: No shortness of breath or cough.  Cardiovascular: No chest pain, palpitations, or dyspnea on exertion.  - Pacemaker. GI: Lorne noted above.  : No dysuria or hematuria.  Endocrine: No heat or cold intolerance. Musculoskeletal: No myalgias or arthralgias.  Neuro: No weakness, numbness, syncope, or seizures. Psych: No mood changes or depression. Ext: Denies swelling.    Objective   VITAL SIGNS:  Vitals:    02/09/24 1148   BP: 132/79   Pulse: 95   Resp: 16   Temp: 98.2 °F (36.8 °C)   SpO2: 95%       PHYSICAL EXAM:  GENERAL: In no apparent distress, sitting comfortably in room.                    CARDIOVASCULAR: S1 & S2 detected; no murmurs, rubs or gallops.  CHEST: Clear to auscultation bilaterally; no wheezes, crackles or rubs. Work of breathing normal.  ABDOMEN: Bowel sounds present. Abdomen is soft, nontender, nondistended.   MUSCULOSKELETAL: No tenderness to palpation along the spine or scapulae. Normal range of motion.  EXTREMITIES: No clubbing, cyanosis, edema.  SKIN: No erythema, rashes, ulcerations noted.     PERTINENT IMAGING/PATHOLOGY/LABS (Medical Decision Making):     COORDINATION OF CARE: A copy of this note is sent to the referring provider.    PATHOLOGY (Reviewed):     IMAGING (Reviewed):     LABS (Reviewed):  HEMATOLOGY:  WBC   Date Value Ref Range Status   10/10/2022 4.20 3.40 - 10.80 10*3/mm3 Final   11/07/2020 10.30 4.5 - 11.0  10*3/uL Final     RBC   Date Value Ref Range Status   10/10/2022 3.85 3.77 - 5.28 10*6/mm3 Final   11/07/2020 4.17 4.0 - 5.2 10*6/uL Final     Hemoglobin   Date Value Ref Range Status   10/10/2022 11.9 (L) 12.0 - 15.9 g/dL Final   11/07/2020 12.4 12.0 - 16.0 g/dL Final     Hematocrit   Date Value Ref Range Status   10/10/2022 35.4 34.0 - 46.6 % Final   11/07/2020 36.8 36.0 - 46.0 % Final     Platelets   Date Value Ref Range Status   10/10/2022 109 (L) 140 - 450 10*3/mm3 Final   11/07/2020 233 140 - 440 10*3/uL Final     CHEMISTRY:  Lab Results   Component Value Date    GLUCOSE 206 (H) 10/10/2022    BUN 6 10/10/2022    CREATININE 0.66 10/10/2022    EGFRIFAFRI >60 12/22/2020    BCR 9.1 10/10/2022    K 4.0 10/10/2022    CO2 22.0 10/10/2022    CALCIUM 9.2 10/10/2022    ALBUMIN 4.1 12/22/2020    LABIL2 1.4 12/22/2020    AST 74 (H) 12/22/2020    ALT 76 (H) 12/22/2020     Assessment & Plan   ASSESSMENT AND PLAN:    Locally advanced rectal adenocarcinoma.  -JESSICA chemo followed by chemoXRT  -[id number]  Interval scope (at [facility]) to be reviewed when documents available but patient tells it was a good study.  She has new bowel protocol by Dr. Bowman.  Follows closely with Dr. Napoles and now Dr. Tabor.  We will sign off after reviewing the C-scope findings.      This assessment comes from my review of the imaging, pathology, physician notes and other pertinent information as mentioned.    DISPOSITION: PRN here    TIME SPENT WITH PATIENT:   I spent 22 minutes caring for Yun on this date of service. This time includes time spent by me in the following activities: preparing for the visit, reviewing tests, obtaining and/or reviewing a separately obtained history, performing a medically appropriate examination and/or evaluation, counseling and educating the patient/family/caregiver, documenting information in the medical record, independently interpreting results and communicating that information with the patient/family/caregiver,  and care coordination    CC: MD Ashely Friedman MD    Approved by:     Trent Luna MD

## 2024-02-08 ENCOUNTER — HOSPITAL ENCOUNTER (OUTPATIENT)
Dept: ONCOLOGY | Facility: HOSPITAL | Age: 49
Discharge: HOME | End: 2024-02-08
Admitting: INTERNAL MEDICINE
Payer: COMMERCIAL

## 2024-02-08 ENCOUNTER — CONSULT (OUTPATIENT)
Dept: ONCOLOGY | Facility: CLINIC | Age: 49
End: 2024-02-08
Payer: COMMERCIAL

## 2024-02-08 VITALS
OXYGEN SATURATION: 97 % | HEART RATE: 91 BPM | WEIGHT: 214.4 LBS | DIASTOLIC BLOOD PRESSURE: 80 MMHG | RESPIRATION RATE: 18 BRPM | HEIGHT: 68 IN | SYSTOLIC BLOOD PRESSURE: 135 MMHG | BODY MASS INDEX: 32.49 KG/M2 | TEMPERATURE: 98 F

## 2024-02-08 DIAGNOSIS — C20 RECTAL CANCER: Primary | ICD-10-CM

## 2024-02-08 DIAGNOSIS — G89.3 NEOPLASM RELATED PAIN: ICD-10-CM

## 2024-02-08 LAB
ALBUMIN SERPL-MCNC: 4.2 G/DL (ref 3.5–5.2)
ALBUMIN/GLOB SERPL: 1.3 G/DL
ALP SERPL-CCNC: 133 U/L (ref 39–117)
ALT SERPL W P-5'-P-CCNC: 29 U/L (ref 1–33)
ANION GAP SERPL CALCULATED.3IONS-SCNC: 10 MMOL/L (ref 5–15)
AST SERPL-CCNC: 31 U/L (ref 1–32)
BASOPHILS # BLD AUTO: 0.01 10*3/MM3 (ref 0–0.2)
BASOPHILS NFR BLD AUTO: 0.2 % (ref 0–1.5)
BILIRUB SERPL-MCNC: 0.2 MG/DL (ref 0–1.2)
BUN SERPL-MCNC: 13 MG/DL (ref 6–20)
BUN/CREAT SERPL: 16.3 (ref 7–25)
CALCIUM SPEC-SCNC: 9.5 MG/DL (ref 8.6–10.5)
CEA SERPL-MCNC: 1.3 NG/ML
CHLORIDE SERPL-SCNC: 106 MMOL/L (ref 98–107)
CO2 SERPL-SCNC: 24 MMOL/L (ref 22–29)
CREAT SERPL-MCNC: 0.8 MG/DL (ref 0.57–1)
DEPRECATED RDW RBC AUTO: 42.2 FL (ref 37–54)
EGFRCR SERPLBLD CKD-EPI 2021: 91 ML/MIN/1.73
EOSINOPHIL # BLD AUTO: 0.08 10*3/MM3 (ref 0–0.4)
EOSINOPHIL NFR BLD AUTO: 1.8 % (ref 0.3–6.2)
ERYTHROCYTE [DISTWIDTH] IN BLOOD BY AUTOMATED COUNT: 13.8 % (ref 12.3–15.4)
GLOBULIN UR ELPH-MCNC: 3.3 GM/DL
GLUCOSE SERPL-MCNC: 171 MG/DL (ref 65–99)
HCT VFR BLD AUTO: 37.3 % (ref 34–46.6)
HGB BLD-MCNC: 12.2 G/DL (ref 12–15.9)
LYMPHOCYTES # BLD AUTO: 0.58 10*3/MM3 (ref 0.7–3.1)
LYMPHOCYTES NFR BLD AUTO: 12.9 % (ref 19.6–45.3)
MCH RBC QN AUTO: 28.2 PG (ref 26.6–33)
MCHC RBC AUTO-ENTMCNC: 32.7 G/DL (ref 31.5–35.7)
MCV RBC AUTO: 86.3 FL (ref 79–97)
MONOCYTES # BLD AUTO: 0.32 10*3/MM3 (ref 0.1–0.9)
MONOCYTES NFR BLD AUTO: 7.1 % (ref 5–12)
NEUTROPHILS NFR BLD AUTO: 3.5 10*3/MM3 (ref 1.7–7)
NEUTROPHILS NFR BLD AUTO: 78 % (ref 42.7–76)
PLATELET # BLD AUTO: 180 10*3/MM3 (ref 140–450)
PMV BLD AUTO: 11.3 FL (ref 6–12)
POTASSIUM SERPL-SCNC: 4.2 MMOL/L (ref 3.5–5.2)
PROT SERPL-MCNC: 7.5 G/DL (ref 6–8.5)
RBC # BLD AUTO: 4.32 10*6/MM3 (ref 3.77–5.28)
SODIUM SERPL-SCNC: 140 MMOL/L (ref 136–145)
WBC NRBC COR # BLD AUTO: 4.49 10*3/MM3 (ref 3.4–10.8)

## 2024-02-08 PROCEDURE — 85025 COMPLETE CBC W/AUTO DIFF WBC: CPT | Performed by: INTERNAL MEDICINE

## 2024-02-08 PROCEDURE — 25010000002 HEPARIN LOCK FLUSH PER 10 UNITS: Performed by: INTERNAL MEDICINE

## 2024-02-08 PROCEDURE — 36591 DRAW BLOOD OFF VENOUS DEVICE: CPT

## 2024-02-08 PROCEDURE — 82378 CARCINOEMBRYONIC ANTIGEN: CPT | Performed by: INTERNAL MEDICINE

## 2024-02-08 PROCEDURE — 80053 COMPREHEN METABOLIC PANEL: CPT | Performed by: INTERNAL MEDICINE

## 2024-02-08 RX ORDER — SODIUM CHLORIDE 0.9 % (FLUSH) 0.9 %
10 SYRINGE (ML) INJECTION AS NEEDED
Status: CANCELLED | OUTPATIENT
Start: 2024-02-08

## 2024-02-08 RX ORDER — HEPARIN SODIUM (PORCINE) LOCK FLUSH IV SOLN 100 UNIT/ML 100 UNIT/ML
500 SOLUTION INTRAVENOUS AS NEEDED
OUTPATIENT
Start: 2024-02-08

## 2024-02-08 RX ORDER — GABAPENTIN 300 MG/1
300 CAPSULE ORAL 3 TIMES DAILY
Qty: 90 CAPSULE | Refills: 1 | Status: SHIPPED | OUTPATIENT
Start: 2024-02-08

## 2024-02-08 RX ORDER — SODIUM PHOSPHATE,MONO-DIBASIC 19G-7G/118
ENEMA (ML) RECTAL
COMMUNITY

## 2024-02-08 RX ORDER — BACLOFEN 10 MG/1
10 TABLET ORAL 3 TIMES DAILY
COMMUNITY

## 2024-02-08 RX ORDER — SODIUM CHLORIDE 0.9 % (FLUSH) 0.9 %
10 SYRINGE (ML) INJECTION AS NEEDED
OUTPATIENT
Start: 2024-02-08

## 2024-02-08 RX ORDER — TOPIRAMATE 50 MG/1
50 TABLET, FILM COATED ORAL 2 TIMES DAILY
COMMUNITY

## 2024-02-08 RX ORDER — HEPARIN SODIUM (PORCINE) LOCK FLUSH IV SOLN 100 UNIT/ML 100 UNIT/ML
500 SOLUTION INTRAVENOUS AS NEEDED
Status: DISCONTINUED | OUTPATIENT
Start: 2024-02-08 | End: 2024-02-09 | Stop reason: HOSPADM

## 2024-02-08 RX ORDER — HEPARIN SODIUM (PORCINE) LOCK FLUSH IV SOLN 100 UNIT/ML 100 UNIT/ML
500 SOLUTION INTRAVENOUS AS NEEDED
Status: CANCELLED | OUTPATIENT
Start: 2024-02-08

## 2024-02-08 RX ORDER — FEXOFENADINE HCL 180 MG/1
180 TABLET ORAL DAILY
COMMUNITY

## 2024-02-08 RX ORDER — SODIUM CHLORIDE 0.9 % (FLUSH) 0.9 %
10 SYRINGE (ML) INJECTION AS NEEDED
Status: DISCONTINUED | OUTPATIENT
Start: 2024-02-08 | End: 2024-02-09 | Stop reason: HOSPADM

## 2024-02-08 RX ADMIN — Medication 10 ML: at 13:12

## 2024-02-08 RX ADMIN — HEPARIN 500 UNITS: 100 SYRINGE at 13:14

## 2024-02-08 NOTE — PROGRESS NOTES
Patient in for port flush and blood draw.  10 cc blood wasted prior to specimen collection.  Specimens collected and sent to lab for processing. Gave AVS per [facility] staff.

## 2024-02-09 ENCOUNTER — OFFICE VISIT (OUTPATIENT)
Dept: RADIATION ONCOLOGY | Facility: HOSPITAL | Age: 49
End: 2024-02-09
Payer: COMMERCIAL

## 2024-02-09 VITALS
HEART RATE: 95 BPM | OXYGEN SATURATION: 95 % | SYSTOLIC BLOOD PRESSURE: 132 MMHG | HEIGHT: 68 IN | TEMPERATURE: 98.2 F | WEIGHT: 213.4 LBS | RESPIRATION RATE: 16 BRPM | BODY MASS INDEX: 32.34 KG/M2 | DIASTOLIC BLOOD PRESSURE: 79 MMHG

## 2024-02-09 DIAGNOSIS — C20 RECTAL CANCER: Primary | ICD-10-CM

## 2024-02-09 PROCEDURE — G0463 HOSPITAL OUTPT CLINIC VISIT: HCPCS | Performed by: RADIOLOGY

## 2024-02-12 ENCOUNTER — TELEPHONE (OUTPATIENT)
Dept: ONCOLOGY | Facility: CLINIC | Age: 49
End: 2024-02-12

## 2024-02-12 NOTE — TELEPHONE ENCOUNTER
Caller: Yun Mcgowan    Relationship: Self    Best call back number: [phone]     What is the best time to reach you: ASAP    Who are you requesting to speak with (clinical staff, provider,  specific staff member): CLINICAL      What was the call regarding: PT HAD ASKED DR MACKAY TO SEND ORDERS FOR CT WITH CONTRAST TO [facility], BUT SHE DIDN'T REALIZE THAT THIS COULD BE DONE AT THE [facility].  SHE WOULD PREFER TO HAVE IT DONE AT THE [facility]CER CENTER INSTEAD.  PLEASE CALL PT TO ADVISE.

## 2024-02-26 NOTE — PROGRESS NOTES
Date: February 27, 2024  Time In: 1308  Time Out: 1400      PROGRESS NOTE  Data:  Yun Mcgowan is a 48 y.o. female who presents today for individual therapy session at [facility] with NARCISO Woodruff, MARLENE.     Patient Chief Complaint: Follow-up for depression and anxiety    Clinical Maneuvering/Intervention: Yun is pleasant alert and oriented to person place and time.  She states that her anxiety is better as she has been able to accomplish tasks in a more organized fashion.  She makes general list but then only puts 3 things to do each day and prioritizes those.  She feels like this has helped her feel better she has made a dent in things.  She feels like she could do more and gets discouraged with not being able to do this but at this time she is doing what she can for her own emotional health.  She has anxiety with an upcoming test as part of monitoring for her history of colorectal cancer.    Assisted patient in processing above session content; acknowledged and normalized patient’s thoughts, feelings, and concerns. Rationalized patient thought process regarding giving herself credit for doing what she is doing. Discussed triggers associated with patient's anxiety and depression. Also discussed coping skills for patient to implement such as continuing the skills already built.    Allowed patient to freely discuss issues without interruption or judgment. Provided safe, confidential environment to facilitate the development of positive therapeutic relationship and encourage open, honest communication. Assisted patient in identifying risk factors which would indicate the need for higher level of care including thoughts to harm self or others and/or self-harming behavior and encouraged patient to contact this office, call 911, or present to the nearest emergency room should any of these events occur. Discussed crisis intervention services and means to access. Patient adamantly and  convincingly denies current suicidal or homicidal ideation or perceptual disturbance.    Assessment   Patient appears to maintain relative stability as compared to their baseline. However, patient continues to struggle with anxiety and depression which continues to cause impairment in important areas of functioning. A result, they can be reasonably expected to continue to benefit from treatment and would likely be at increased risk for decompensation otherwise.    Mental Status Exam:   Hygiene:   good  Cooperation:  Cooperative  Eye Contact:  Good  Psychomotor Behavior:  Appropriate  Affect:  Appropriate  Mood: Tearful at times   speech:  Normal  Thought Process:  Goal directed and Linear  Thought Content:  Normal  Suicidal:  None  Homicidal:  None  Hallucinations:  None  Delusion:  None  Memory:  Intact  Orientation:  Person, Place, Time and Situation  Reliability:  good  Insight:  Good  Judgement:  Good  Impulse Control:  Good  Physical/Medical Issues:  See diagnosis list   PHQ-Score Total:  PHQ-9 Total Score: PHQ-9 Depression Screening  Little interest or pleasure in doing things? 0-->not at all   Feeling down, depressed, or hopeless? 1-->several days   Trouble falling or staying asleep, or sleeping too much? 1-->several days   Feeling tired or having little energy? 1-->several days   Poor appetite or overeating? 1-->several days   Feeling bad about yourself - or that you are a failure or have let yourself or your family down? 0-->not at all   Trouble concentrating on things, such as reading the newspaper or watching television? 1-->several days   Moving or speaking so slowly that other people could have noticed? Or the opposite - being so fidgety or restless that you have been moving around a lot more than usual? 0-->not at all   Thoughts that you would be better off dead, or of hurting yourself in some way? 0-->not at all   PHQ-9 Total Score 5   If you checked off any problems, how difficult have these problems  made it for you to do your work, take care of things at home, or get along with other people?        MARTINE-7 Total Score:   Over the last two weeks, how often have you been bothered by the following problems?  Feeling nervous, anxious or on edge: Several days  Not being able to stop or control worrying: Not at all  Worrying too much about different things: Several days  Trouble Relaxing: Several days  Being so restless that it is hard to sit still: Not at all  Becoming easily annoyed or irritable: Several days  Feeling afraid as if something awful might happen: Not at all  MARTINE 7 Total Score: 4     Patient's Support Network Includes:  significant other    Functional Status: Mild impairment     Progress toward goal: Not at goal    Prognosis: Good with Ongoing Treatment          Plan     Resources: Patient was provided with the following community resources: None at this time    Patient will continue in individual outpatient therapy with focus on improved functioning and coping skills, maintaining stability, and avoiding decompensation and the need for higher level of care.    Patient will adhere to any medication regimens as prescribed and report any side effects. Patient will contact this office, call 911 or present to the nearest emergency room should suicidal or homicidal ideations occur. Provide cognitive behavioral therapy and solution focused therapy to improve functioning, maintain stability and avoid decompensation and the need for higher level of care.     Return at first available appointment or earlier if symptoms worsen or fail to improve.           VISIT DIAGNOSIS:     ICD-10-CM ICD-9-CM   1. Generalized anxiety disorder  F41.1 300.02   2. Major depressive disorder, recurrent, moderate  F33.1 296.32            This document has been electronically signed by NARCISO Woodruff, MARLENE   February 27, 2024 15:10 EST      Part of this note may be an electronic transcription/translation of spoken language to  printed text using the Dragon Dictation System.

## 2024-02-27 ENCOUNTER — OFFICE VISIT (OUTPATIENT)
Dept: PSYCHIATRY | Facility: CLINIC | Age: 49
End: 2024-02-27
Payer: COMMERCIAL

## 2024-02-27 DIAGNOSIS — F41.1 GENERALIZED ANXIETY DISORDER: Primary | ICD-10-CM

## 2024-02-27 DIAGNOSIS — F33.1 MAJOR DEPRESSIVE DISORDER, RECURRENT, MODERATE: ICD-10-CM

## 2024-03-02 LAB
T4 FREE SERPL-MCNC: 1.12 NG/DL (ref 0.82–1.77)
TSH SERPL DL<=0.005 MIU/L-ACNC: 9.17 UIU/ML (ref 0.45–4.5)

## 2024-03-05 ENCOUNTER — HOSPITAL ENCOUNTER (OUTPATIENT)
Dept: PET IMAGING | Facility: HOSPITAL | Age: 49
Discharge: HOME | End: 2024-03-05
Admitting: INTERNAL MEDICINE
Payer: COMMERCIAL

## 2024-03-05 DIAGNOSIS — C20 RECTAL CANCER: ICD-10-CM

## 2024-03-05 PROCEDURE — 71260 CT THORAX DX C+: CPT

## 2024-03-05 PROCEDURE — 25510000001 IOPAMIDOL PER 1 ML: Performed by: INTERNAL MEDICINE

## 2024-03-05 PROCEDURE — 74177 CT ABD & PELVIS W/CONTRAST: CPT

## 2024-03-05 RX ADMIN — IOPAMIDOL 100 ML: 755 INJECTION, SOLUTION INTRAVENOUS at 09:18

## 2024-03-06 ENCOUNTER — PATIENT MESSAGE (OUTPATIENT)
Dept: ENDOCRINOLOGY | Facility: CLINIC | Age: 49
End: 2024-03-06
Payer: COMMERCIAL

## 2024-03-18 NOTE — PROGRESS NOTES
Date: March 19, 2024  Time In: 1102  Time Out: 1201      PROGRESS NOTE  Data:  Yun Mcgowan is a 48 y.o. female who presents today for individual therapy session at [facility] with NARCISO Woodruff LCSW.     Patient Chief Complaint: Follow-up for depression and anxiety     Clinical Maneuvering/Intervention: Yun is pleasant alert and oriented to person place and time.  She states that her last scan was clear so she does feel somewhat better about things.  However she is still apprehensive about looking for a job because of the symptoms that remain from her illness.  She noted being hard on herself for not getting things around the house again.  Reframing provided for what she has gone through for the past 18 months.  She talked about various relationships that she has with her mother-in-law, , as cousin Lillie and her friend Pedro.  With each of these relationships including her  she has had difficulty with the relationship being mutually equitable.    Assisted patient in processing above session content; acknowledged and normalized patient’s thoughts, feelings, and concerns. Rationalized patient thought process regarding deciding what she wants in relationships and changing relationships to help meet her needs. Discussed triggers associated with patient's depression. Also discussed coping skills for patient to implement such as continuing with skills already built.    Allowed patient to freely discuss issues without interruption or judgment. Provided safe, confidential environment to facilitate the development of positive therapeutic relationship and encourage open, honest communication. Assisted patient in identifying risk factors which would indicate the need for higher level of care including thoughts to harm self or others and/or self-harming behavior and encouraged patient to contact this office, call 911, or present to the nearest emergency room should any of  these events occur. Discussed crisis intervention services and means to access. Patient adamantly and convincingly denies current suicidal or homicidal ideation or perceptual disturbance.    Assessment   Patient appears to maintain relative stability as compared to their baseline. However, patient continues to struggle with pression and anxiety which continues to cause impairment in important areas of functioning. A result, they can be reasonably expected to continue to benefit from treatment and would likely be at increased risk for decompensation otherwise.    Mental Status Exam:     Hygiene:   good  Cooperation:  Cooperative  Eye Contact:  Good  Psychomotor Behavior:  Appropriate  Affect:  Appropriate  Mood: Sad, tearful at times   speech:  Normal  Thought Process:  Goal directed and Linear  Thought Content:  Normal  Suicidal:  None  Homicidal:  None  Hallucinations:  None  Delusion:  None  Memory:  Intact  Orientation:  Person, Place, Time and Situation  Reliability:  good  Insight:  Good  Judgement:  Good  Impulse Control:  Good  Physical/Medical Issues:  See diagnosis list     PHQ-Score Total:  PHQ-9 Total Score: PHQ-9 Depression Screening  Little interest or pleasure in doing things? 0-->not at all   Feeling down, depressed, or hopeless? 0-->not at all   Trouble falling or staying asleep, or sleeping too much? 1-->several days   Feeling tired or having little energy? 1-->several days   Poor appetite or overeating? 1-->several days   Feeling bad about yourself - or that you are a failure or have let yourself or your family down? 1-->several days   Trouble concentrating on things, such as reading the newspaper or watching television? 1-->several days   Moving or speaking so slowly that other people could have noticed? Or the opposite - being so fidgety or restless that you have been moving around a lot more than usual? 0-->not at all   Thoughts that you would be better off dead, or of hurting yourself in some  way? 0-->not at all   PHQ-9 Total Score 5   If you checked off any problems, how difficult have these problems made it for you to do your work, take care of things at home, or get along with other people?        MARTINE-7 Total Score:   Over the last two weeks, how often have you been bothered by the following problems?  Feeling nervous, anxious or on edge: Several days  Not being able to stop or control worrying: Not at all  Worrying too much about different things: Several days  Trouble Relaxing: Several days  Being so restless that it is hard to sit still: Not at all  Becoming easily annoyed or irritable: Not at all  Feeling afraid as if something awful might happen: Not at all  MARTINE 7 Total Score: 3     Patient's Support Network Includes:  significant other     Functional Status: Mild impairment      Progress toward goal: Not at goal     Prognosis: Good with Ongoing Treatment           Plan     Resources: Patient was provided with the following community resources: None at this time     Patient will continue in individual outpatient therapy with focus on improved functioning and coping skills, maintaining stability, and avoiding decompensation and the need for higher level of care.    Patient will adhere to any medication regimens as prescribed and report any side effects. Patient will contact this office, call 911 or present to the nearest emergency room should suicidal or homicidal ideations occur. Provide cognitive behavioral therapy and solution focused therapy to improve functioning, maintain stability and avoid decompensation and the need for higher level of care.     Return at first available appointment or earlier if symptoms worsen or fail to improve.           VISIT DIAGNOSIS:     ICD-10-CM ICD-9-CM   1. Recurrent major depressive disorder, in partial remission  F33.41 296.35   2. Generalized anxiety disorder  F41.1 300.02            This document has been electronically signed by NARCISO Woodruff, CHERIEW    March 19, 2024 12:06 EDT      Part of this note may be an electronic transcription/translation of spoken language to printed text using the Dragon Dictation System.           None

## 2024-03-19 ENCOUNTER — OFFICE VISIT (OUTPATIENT)
Dept: PSYCHIATRY | Facility: CLINIC | Age: 49
End: 2024-03-19
Payer: COMMERCIAL

## 2024-03-19 DIAGNOSIS — F41.1 GENERALIZED ANXIETY DISORDER: ICD-10-CM

## 2024-03-19 DIAGNOSIS — F33.41 RECURRENT MAJOR DEPRESSIVE DISORDER, IN PARTIAL REMISSION: Primary | ICD-10-CM

## 2024-03-20 ENCOUNTER — OFFICE VISIT (OUTPATIENT)
Dept: PSYCHIATRY | Facility: CLINIC | Age: 49
End: 2024-03-20
Payer: COMMERCIAL

## 2024-03-20 DIAGNOSIS — F33.1 MAJOR DEPRESSIVE DISORDER, RECURRENT EPISODE, MODERATE: Primary | Chronic | ICD-10-CM

## 2024-03-20 DIAGNOSIS — F41.1 GENERALIZED ANXIETY DISORDER: Chronic | ICD-10-CM

## 2024-03-20 NOTE — PROGRESS NOTES
Subjective   Yun Mcgowan is a 48 y.o. female who presents today for follow-up for psychiatric medication management.     Chief Complaint:  Depression, anxiety     History of Present Illness:     Medication adjustments last visit:  Continue Wellbutrin XL 150mg.   Continue Viibryd, increase to 40mg.     She is taking 12mg of melatonin for sleep. Still having some sleep issues.   She feels like she is crying less. She still has a lot of anxiety.   She feels like therapy is helpful with Marsha. She states she is the best therapist she has ever had.   She will let me know if she wants me to send in anything for sleep. She is going to monitor it for a bit.   Denies any suicidal thoughts.     Patient presents with symptoms and behaviors that are consistent with the following DSM-5 diagnoses:  Major depressive disorder, mod, recurrent  2. Generalized anxiety disorder    The following portions of the patient's history were reviewed and updated as appropriate: allergies, current medications, past family history, past medical history, past social history, past surgical history and problem list.    PAST OUTPATIENT TREATMENT  Diagnosis treated:  Affective Disorder, Anxiety/Panic Disorder  Treatment Type:  Medication Management  Prior Psychiatric Medications:  Xanax - helpful for panic.  Bupropion XL 300mg  Gabapentin  phenteramine - for wt loss  Promethazine - N/V  topomax 25 bid - migraines  Pristiq  Fluoxetine--wasn't helping   Support Groups:  None  Sequelae Of Mental Disorder:  emotional distress    Interval History  Some improvement.     Side Effects  Diarrhea and headaches      Past Medical History:  Past Medical History:   Diagnosis Date    Adenocarcinoma of rectum     Adenoma of liver     Alopecia     Anxiety     Diabetes mellitus     Disease of thyroid gland     GERD (gastroesophageal reflux disease)     Graves disease     Hashimoto's thyroiditis 2020    See records Mohini ANNE    Hyperlipidemia      Hypertension     Hyperthyroidism     See records Dr. Domitila Nguyen    Hypothyroidism     See records Dr. Domitila Nguyen    IBS (irritable bowel syndrome)     Type 2 diabetes mellitus 2000    See records Dr. Domitila Nguyen       Social History:  Social History     Socioeconomic History    Marital status:      Spouse name: Ryan    Number of children: 0    Highest education level: Bachelor's degree (e.g., BA, AB, BS)   Tobacco Use    Smoking status: Never    Smokeless tobacco: Never   Vaping Use    Vaping status: Never Used   Substance and Sexual Activity    Alcohol use: Yes     Alcohol/week: 2.0 standard drinks of alcohol     Types: 2 Drinks containing 0.5 oz of alcohol per week     Comment: Socially; max 2 drinks/week    Drug use: Yes     Types: Marijuana     Comment: THC gummies    Sexual activity: Yes     Partners: Male     Comment: No contraception--menopausal now and infertility issues       Family History:  Family History   Problem Relation Age of Onset    Cancer Maternal Grandmother         Colon cancer;         Past Surgical History:  Past Surgical History:   Procedure Laterality Date    CATARACT EXTRACTION Right 2023    COLONOSCOPY      ILEOSTOMY      LAPAROSCOPIC OOPHORECTOMY Bilateral        Problem List:  Patient Active Problem List   Diagnosis    Rectal cancer    Benign neoplasm of liver    Diabetes mellitus    Gastroesophageal reflux disease    Hepatic cirrhosis    Hyperlipidemia    Hypertension    Lipodystrophy    Liver mass    Migraine headache    Obesity    Alopecia    Postablative hypothyroidism    Seasonal allergic rhinitis    Premature atrial contraction    Recurrent major depressive disorder, in partial remission    Generalized anxiety disorder    Adjustment disorder with anxious mood    Major depressive disorder, recurrent episode, moderate    Abnormal liver enzymes    Altered bowel function    Change in bowel habits    Constipation    Diarrhea    Ascites    Encounter for  immunization    Fatty liver    Hematochezia    History of rectal cancer    Irritable bowel syndrome    Major depressive disorder with single episode    Melena    Neoplasm of uncertain behavior of colon    Nonalcoholic steatohepatitis (DAMON)    Noncompliance with medication regimen    Personal history of colonic polyps    Personal history of other malignant neoplasm of rectum, rectosigmoid junction, and anus    Polyp of colon    Hemorrhage of anus and rectum    Rectal bleeding    Rosacea    Seasonal allergies    Other asthma    Sleep apnea    Intestinal obstruction    Unspecified intestinal obstruction, unspecified as to partial versus complete obstruction    Thyroid disease    Shoulder pain    Foreign body in stomach    Gastric foreign body    COVID-19 vaccine administered       Allergy:   Allergies   Allergen Reactions    Adhesive Tape Unknown - Low Severity, Itching and Rash    Wound Dressing Adhesive Itching     Skin gets itchy and irritated        Discontinued Medications:  There are no discontinued medications.        Current Medications:   Current Outpatient Medications   Medication Sig Dispense Refill    acetaminophen (Tylenol 8 Hour) 650 MG 8 hr tablet Take 2 tablets every 8 hours by oral route.      ALPRAZolam (XANAX) 0.5 MG tablet 2      buPROPion XL (WELLBUTRIN XL) 150 MG 24 hr tablet Take 1 tablet by mouth Every Morning. 90 tablet 1    Continuous Blood Gluc Sensor (FreeStyle Fang 3 Sensor) misc CHANGE SENSOR EVERY 14 DAYS      Dicyclomine HCl (BENTYL IM) 3 times a day.      fexofenadine (ALLEGRA) 180 MG tablet Take 1 tablet by mouth Daily.      gabapentin (NEURONTIN) 300 MG capsule Take 1 capsule by mouth 3 (Three) Times a Day. 90 capsule 1    glucosamine sulfate 500 MG capsule capsule Take  by mouth 3 (Three) Times a Day With Meals.      HumaLOG KwikPen 200 UNIT/ML solution pen-injector INJECT 70 UNITS UNDER THE SKIN 3 (THREE) TIMES A DAY WITH MEALS.      ibuprofen (ADVIL,MOTRIN) 800 MG tablet Take  by  mouth.      Insulin Degludec (Tresiba FlexTouch) 200 UNIT/ML solution pen-injector pen injection Inject 80 Units under the skin into the appropriate area as directed Daily.      Lactase (LACTAID PO)       levothyroxine (SYNTHROID, LEVOTHROID) 137 MCG tablet Take 1 tablet by mouth Daily. Brand name Synthroid only 90 tablet 1    lisinopril (PRINIVIL,ZESTRIL) 10 MG tablet Take 1 tablet by mouth Daily.      loperamide (IMODIUM) 2 MG capsule Take 1 capsule by mouth 3 (Three) Times a Day.      Melatonin 10 MG tablet Take 12 mg by mouth Every Night.      Mounjaro 5 MG/0.5ML solution pen-injector INJECT 5 MG SUBCUTANEOUSLY EVERY 7 DAYS      pantoprazole (PROTONIX) 40 MG EC tablet Take 1 tablet by mouth Daily.      promethazine (PHENERGAN) 25 MG tablet TAKE 1 TABLET BY MOUTH EVERY 4 TO 6 HOURS AS NEEDED      rizatriptan (MAXALT) 10 MG tablet Take 1 tablet by mouth 1 (One) Time As Needed for Migraine. May repeat in 2 hours if needed      silver sulfadiazine (SILVADENE, SSD) 1 % cream Apply 1 application topically to the appropriate area as directed 2 (Two) Times a Day. 50 g 2    topiramate (TOPAMAX) 50 MG tablet Take 1 tablet by mouth Every Night.      vilazodone (VIIBRYD) 20 MG tablet tablet TAKE 1 TABLET BY MOUTH EVERY DAY (Patient taking differently: Take 2 tablets by mouth Daily.) 30 tablet 0    vitamin B-12 (CYANOCOBALAMIN) 1000 MCG tablet Take 1 tablet by mouth Daily.      vitamin D3 125 MCG (5000 UT) capsule capsule Take 1 capsule by mouth Daily.      vitamin E 400 UNIT capsule 1 capsule.      AZO CRANBERRY GUMMIES PO   2 tabs, Oral, Daily, 0 Refill(s) (Patient not taking: Reported on 3/20/2024)      B-D ULTRAFINE III SHORT PEN 31G X 8 MM misc USE TO INJECT INSULIN (1) ONE TO (4 ) FOUR TIMES DAILY E11.9 (Patient not taking: Reported on 3/20/2024)      baclofen (LIORESAL) 10 MG tablet Take 1 tablet by mouth 3 (Three) Times a Day. (Patient not taking: Reported on 3/20/2024)      Ivermectin (Soolantra) 1 % cream APPLY A  PEA-SIZED AMOUNT BY TOPICAL ROUTE ONCE DAILY TO COVER AREAS OF FACE WITH THIN LAYER AVOIDING THE EYES AND LIPS (Patient not taking: Reported on 3/20/2024)       No current facility-administered medications for this visit.         Psychological ROS: positive for - anxiety and depression  negative for - behavioral disorder, concentration difficulties, decreased libido, disorientation, hallucinations, hostility, irritability, memory difficulties, mood swings, obsessive thoughts, physical abuse, sexual abuse, sleep disturbances or suicidal ideation      Physical Exam:   There were no vitals taken for this visit.    Mental Status Exam:   Hygiene:   good  Cooperation:  Cooperative  Eye Contact:  Good  Psychomotor Behavior:  Appropriate  Affect:  Appropriate  Mood: depressed and anxious  Hopelessness: Denies  Speech:  Normal  Thought Process:  Linear  Thought Content:  Normal  Suicidal:  None  Homicidal:  None  Hallucinations:  None  Delusion:  None  Memory:  Intact  Orientation:  Person, Place, Time and Situation  Reliability:  good  Insight:  Good  Judgement:  Good  Impulse Control:  Good  Physical/Medical Issues:  Yes Colorectal cancer       Mental status exam was reviewed and compared to visit on 1/23/24 and appropriate updates were made.     PHQ-9 Depression Screening    Little interest or pleasure in doing things? 0-->not at all   Feeling down, depressed, or hopeless? 0-->not at all   Trouble falling or staying asleep, or sleeping too much? 1-->several days   Feeling tired or having little energy? 1-->several days   Poor appetite or overeating? 1-->several days   Feeling bad about yourself - or that you are a failure or have let yourself or your family down? 0-->not at all   Trouble concentrating on things, such as reading the newspaper or watching television? 1-->several days   Moving or speaking so slowly that other people could have noticed? Or the opposite - being so fidgety or restless that you have been moving  around a lot more than usual? 0-->not at all   Thoughts that you would be better off dead, or of hurting yourself in some way? 0-->not at all   PHQ-9 Total Score 4   If you checked off any problems, how difficult have these problems made it for you to do your work, take care of things at home, or get along with other people? somewhat difficult        Never smoker    I advised Ondrea of the risks of tobacco use.     Result Review:    Labs:  Orders Only on 03/01/2024   Component Date Value Ref Range Status    TSH 03/01/2024 9.170 (H)  0.450 - 4.500 uIU/mL Final    Free T4 03/01/2024 1.12  0.82 - 1.77 ng/dL Final   Consult on 02/08/2024   Component Date Value Ref Range Status    Glucose 02/08/2024 171 (H)  65 - 99 mg/dL Final    BUN 02/08/2024 13  6 - 20 mg/dL Final    Creatinine 02/08/2024 0.80  0.57 - 1.00 mg/dL Final    Sodium 02/08/2024 140  136 - 145 mmol/L Final    Potassium 02/08/2024 4.2  3.5 - 5.2 mmol/L Final    Chloride 02/08/2024 106  98 - 107 mmol/L Final    CO2 02/08/2024 24.0  22.0 - 29.0 mmol/L Final    Calcium 02/08/2024 9.5  8.6 - 10.5 mg/dL Final    Total Protein 02/08/2024 7.5  6.0 - 8.5 g/dL Final    Albumin 02/08/2024 4.2  3.5 - 5.2 g/dL Final    ALT (SGPT) 02/08/2024 29  1 - 33 U/L Final    AST (SGOT) 02/08/2024 31  1 - 32 U/L Final    Alkaline Phosphatase 02/08/2024 133 (H)  39 - 117 U/L Final    Total Bilirubin 02/08/2024 0.2  0.0 - 1.2 mg/dL Final    Globulin 02/08/2024 3.3  gm/dL Final    A/G Ratio 02/08/2024 1.3  g/dL Final    BUN/Creatinine Ratio 02/08/2024 16.3  7.0 - 25.0 Final    Anion Gap 02/08/2024 10.0  5.0 - 15.0 mmol/L Final    eGFR 02/08/2024 91.0  >60.0 mL/min/1.73 Final    CEA 02/08/2024 1.30  ng/mL Final    WBC 02/08/2024 4.49  3.40 - 10.80 10*3/mm3 Final    RBC 02/08/2024 4.32  3.77 - 5.28 10*6/mm3 Final    Hemoglobin 02/08/2024 12.2  12.0 - 15.9 g/dL Final    Hematocrit 02/08/2024 37.3  34.0 - 46.6 % Final    MCV 02/08/2024 86.3  79.0 - 97.0 fL Final    MCH 02/08/2024 28.2   26.6 - 33.0 pg Final    MCHC 02/08/2024 32.7  31.5 - 35.7 g/dL Final    RDW 02/08/2024 13.8  12.3 - 15.4 % Final    RDW-SD 02/08/2024 42.2  37.0 - 54.0 fl Final    MPV 02/08/2024 11.3  6.0 - 12.0 fL Final    Platelets 02/08/2024 180  140 - 450 10*3/mm3 Final    Neutrophil % 02/08/2024 78.0 (H)  42.7 - 76.0 % Final    Lymphocyte % 02/08/2024 12.9 (L)  19.6 - 45.3 % Final    Monocyte % 02/08/2024 7.1  5.0 - 12.0 % Final    Eosinophil % 02/08/2024 1.8  0.3 - 6.2 % Final    Basophil % 02/08/2024 0.2  0.0 - 1.5 % Final    Neutrophils, Absolute 02/08/2024 3.50  1.70 - 7.00 10*3/mm3 Final    Lymphocytes, Absolute 02/08/2024 0.58 (L)  0.70 - 3.10 10*3/mm3 Final    Monocytes, Absolute 02/08/2024 0.32  0.10 - 0.90 10*3/mm3 Final    Eosinophils, Absolute 02/08/2024 0.08  0.00 - 0.40 10*3/mm3 Final    Basophils, Absolute 02/08/2024 0.01  0.00 - 0.20 10*3/mm3 Final       Assessment & Plan   Diagnoses and all orders for this visit:    1. Major depressive disorder, recurrent episode, moderate (Primary)    2. Generalized anxiety disorder      Continue Wellbutrin XL 150mg.   Continue Viibryd 40mg.     Visit Diagnoses:    ICD-10-CM ICD-9-CM   1. Major depressive disorder, recurrent episode, moderate  F33.1 296.32   2. Generalized anxiety disorder  F41.1 300.02       TREATMENT PLAN/GOALS: Continue supportive psychotherapy efforts and medications as indicated. Treatment and medication options discussed during today's visit. Patient ackowledged and verbally consented to continue with current treatment plan and was educated on the importance of compliance with treatment and follow-up appointments.    MEDICATION ISSUES:  INSPECT reviewed as expected    Discussed medication options and treatment plan of prescribed medication as well as the risks, benefits, and side effects including potential falls, possible impaired driving and metabolic adversities among others. Patient is agreeable to call the office with any worsening of symptoms or  onset of side effects. Patient is agreeable to call 911 or go to the nearest ER should he/she begin having SI/HI. No medication side effects or related complaints today.     MEDS ORDERED DURING VISIT:  No orders of the defined types were placed in this encounter.      Return in about 3 months (around 6/20/2024).         This document has been electronically signed by DAYANA Lynch  March 20, 2024 15:48 EDT    Part of this note may be an electronic transcription/translation of spoken language to printed text using the Dragon Dictation System.

## 2024-03-28 NOTE — TELEPHONE ENCOUNTER
Pt called confirming that she had received the message about labs and will  Rx from [facility] in [place].   
spontaneous/unlabored

## 2024-04-17 NOTE — PROGRESS NOTES
Date: April 18, 2024  Time In: 1402  Time Out: 0855      PROGRESS NOTE  Data:    Yun Mcgowan is a 49 y.o. female who presents today for individual therapy session through the [facility]. This provider is located at the [facility] located at [address] [place] [address] The Patient is seen remotely at her home  using [employer]. Patient is being seen via telehealth and stated they are in a secure environment for this session. The patient's condition being diagnosed/treated is appropriate for telemedicine. The provider identified herself as well as her credentials. The patient consents to be seen remotely, and when consent is given they understand that the consent allows for patient identifiable information to be sent to a third party as needed. They may refuse to be seen remotely at any time. The electronic data is encrypted and password protected, and the patient has been advised of the potential risks to privacy not withstanding such measures.     Patient Chief Complaint: Follow-up for depression and anxiety     Clinical Maneuvering/Intervention: Yun is pleasant alert and oriented to person place and time.  She states that she has been overwhelmed as she has been doing business taxes and personal taxes.  She felt behind the eightball in getting these things done as she wanted to.  This led to a discussion of her wanting everything to be perfect and how much she puts on herself for that perfection.  She is also considering getting a job at [employer] in a different apartment.  This leaves her very apprehensive and running into people that were not emotionally supportive to her during her cancer journey.    Assisted patient in processing above session content; acknowledged and normalized patient’s thoughts, feelings, and concerns. Rationalized patient thought process regarding allowing herself to do things well but not perfectly. Discussed  triggers associated with patient's anxiety. Also discussed coping skills for patient to implement such as continue with skills already built.    Allowed patient to freely discuss issues without interruption or judgment. Provided safe, confidential environment to facilitate the development of positive therapeutic relationship and encourage open, honest communication. Assisted patient in identifying risk factors which would indicate the need for higher level of care including thoughts to harm self or others and/or self-harming behavior and encouraged patient to contact this office, call 911, or present to the nearest emergency room should any of these events occur. Discussed crisis intervention services and means to access. Patient adamantly and convincingly denies current suicidal or homicidal ideation or perceptual disturbance.    Assessment   Patient appears to maintain relative stability as compared to their baseline. However, patient continues to struggle with depression and anxiety which continues to cause impairment in important areas of functioning. A result, they can be reasonably expected to continue to benefit from treatment and would likely be at increased risk for decompensation otherwise.    Mental Status Exam:   Hygiene:   good  Cooperation:  Cooperative  Eye Contact:  Good  Psychomotor Behavior:  Appropriate  Affect:  Appropriate  Mood: anxious  speech:  Normal  Thought Process:  Goal directed and Linear  Thought Content:  Normal  Suicidal:  None  Homicidal:  None  Hallucinations:  None  Delusion:  None  Memory:  Intact  Orientation:  Person, Place, Time and Situation  Reliability:  good  Insight:  Good  Judgement:  Good  Impulse Control:  Good  Physical/Medical Issues:  See diagnosis list          Patient's Support Network Includes:  significant other     Functional Status: Mild impairment      Progress toward goal: Not at goal     Prognosis: Good with Ongoing Treatment          Plan     Resources:  Patient was provided with the following community resources: None at this time     Patient will continue in individual outpatient therapy with focus on improved functioning and coping skills, maintaining stability, and avoiding decompensation and the need for higher level of care.    Patient will adhere to any medication regimens as prescribed and report any side effects. Patient will contact this office, call 911 or present to the nearest emergency room should suicidal or homicidal ideations occur. Provide cognitive behavioral therapy and solution focused therapy to improve functioning, maintain stability and avoid decompensation and the need for higher level of care.     Return at first available appointment or earlier if symptoms worsen or fail to improve.           VISIT DIAGNOSIS:     ICD-10-CM ICD-9-CM   1. Major depressive disorder, recurrent, moderate  F33.1 296.32   2. Generalized anxiety disorder with panic attacks  F41.1 300.02    F41.0 300.01            This document has been electronically signed by NARCISO Woodruff, MARLENE   April 18, 2024 15:05 EDT

## 2024-04-18 ENCOUNTER — TELEMEDICINE (OUTPATIENT)
Dept: PSYCHIATRY | Facility: CLINIC | Age: 49
End: 2024-04-18
Payer: COMMERCIAL

## 2024-04-18 DIAGNOSIS — F41.1 GENERALIZED ANXIETY DISORDER WITH PANIC ATTACKS: ICD-10-CM

## 2024-04-18 DIAGNOSIS — F41.0 GENERALIZED ANXIETY DISORDER WITH PANIC ATTACKS: ICD-10-CM

## 2024-04-18 DIAGNOSIS — F33.1 MAJOR DEPRESSIVE DISORDER, RECURRENT, MODERATE: Primary | ICD-10-CM

## 2024-04-27 DIAGNOSIS — F41.1 GENERALIZED ANXIETY DISORDER WITH PANIC ATTACKS: Chronic | ICD-10-CM

## 2024-04-27 DIAGNOSIS — F41.0 GENERALIZED ANXIETY DISORDER WITH PANIC ATTACKS: Chronic | ICD-10-CM

## 2024-04-27 DIAGNOSIS — F33.1 MAJOR DEPRESSIVE DISORDER, RECURRENT EPISODE, MODERATE: Chronic | ICD-10-CM

## 2024-04-30 LAB — TSH SERPL DL<=0.005 MIU/L-ACNC: 1.32 UIU/ML (ref 0.45–4.5)

## 2024-04-30 RX ORDER — VILAZODONE HYDROCHLORIDE 40 MG/1
40 TABLET ORAL
Qty: 90 TABLET | Refills: 1 | Status: SHIPPED | OUTPATIENT
Start: 2024-04-30

## 2024-04-30 NOTE — TELEPHONE ENCOUNTER
Patient called back and left a message that she is taking the 40 mg Viibryd. The DC was from the time frame of going back to the 20 mg.  So yes she needs this Refill.

## 2024-05-02 ENCOUNTER — HOSPITAL ENCOUNTER (OUTPATIENT)
Dept: ONCOLOGY | Facility: HOSPITAL | Age: 49
Discharge: HOME | End: 2024-05-02
Admitting: INTERNAL MEDICINE
Payer: COMMERCIAL

## 2024-05-02 DIAGNOSIS — C20 RECTAL CANCER: Primary | ICD-10-CM

## 2024-05-02 DIAGNOSIS — E89.0 POSTABLATIVE HYPOTHYROIDISM: ICD-10-CM

## 2024-05-02 LAB
ALBUMIN SERPL-MCNC: 4.1 G/DL (ref 3.5–5.2)
ALBUMIN/GLOB SERPL: 1.3 G/DL
ALP SERPL-CCNC: 144 U/L (ref 39–117)
ALT SERPL W P-5'-P-CCNC: 38 U/L (ref 1–33)
ANION GAP SERPL CALCULATED.3IONS-SCNC: 13 MMOL/L (ref 5–15)
AST SERPL-CCNC: 37 U/L (ref 1–32)
BASOPHILS # BLD AUTO: 0.02 10*3/MM3 (ref 0–0.2)
BASOPHILS NFR BLD AUTO: 0.3 % (ref 0–1.5)
BILIRUB SERPL-MCNC: 0.3 MG/DL (ref 0–1.2)
BUN SERPL-MCNC: 10 MG/DL (ref 6–20)
BUN/CREAT SERPL: 11.4 (ref 7–25)
CALCIUM SPEC-SCNC: 9.4 MG/DL (ref 8.6–10.5)
CEA SERPL-MCNC: 1.47 NG/ML
CHLORIDE SERPL-SCNC: 106 MMOL/L (ref 98–107)
CO2 SERPL-SCNC: 23 MMOL/L (ref 22–29)
CREAT SERPL-MCNC: 0.88 MG/DL (ref 0.57–1)
DEPRECATED RDW RBC AUTO: 44.3 FL (ref 37–54)
EGFRCR SERPLBLD CKD-EPI 2021: 80.7 ML/MIN/1.73
EOSINOPHIL # BLD AUTO: 0.09 10*3/MM3 (ref 0–0.4)
EOSINOPHIL NFR BLD AUTO: 1.3 % (ref 0.3–6.2)
ERYTHROCYTE [DISTWIDTH] IN BLOOD BY AUTOMATED COUNT: 13.7 % (ref 12.3–15.4)
GLOBULIN UR ELPH-MCNC: 3.2 GM/DL
GLUCOSE SERPL-MCNC: 229 MG/DL (ref 65–99)
HCT VFR BLD AUTO: 39 % (ref 34–46.6)
HGB BLD-MCNC: 12.7 G/DL (ref 12–15.9)
LYMPHOCYTES # BLD AUTO: 0.58 10*3/MM3 (ref 0.7–3.1)
LYMPHOCYTES NFR BLD AUTO: 8.2 % (ref 19.6–45.3)
MCH RBC QN AUTO: 29.5 PG (ref 26.6–33)
MCHC RBC AUTO-ENTMCNC: 32.6 G/DL (ref 31.5–35.7)
MCV RBC AUTO: 90.7 FL (ref 79–97)
MONOCYTES # BLD AUTO: 0.47 10*3/MM3 (ref 0.1–0.9)
MONOCYTES NFR BLD AUTO: 6.7 % (ref 5–12)
NEUTROPHILS NFR BLD AUTO: 5.88 10*3/MM3 (ref 1.7–7)
NEUTROPHILS NFR BLD AUTO: 83.5 % (ref 42.7–76)
PLATELET # BLD AUTO: 193 10*3/MM3 (ref 140–450)
PMV BLD AUTO: 10.8 FL (ref 6–12)
POTASSIUM SERPL-SCNC: 4 MMOL/L (ref 3.5–5.2)
PROT SERPL-MCNC: 7.3 G/DL (ref 6–8.5)
RBC # BLD AUTO: 4.3 10*6/MM3 (ref 3.77–5.28)
SODIUM SERPL-SCNC: 142 MMOL/L (ref 136–145)
T4 FREE SERPL-MCNC: 1.06 NG/DL (ref 0.93–1.7)
TSH SERPL DL<=0.05 MIU/L-ACNC: 3.3 UIU/ML (ref 0.27–4.2)
WBC NRBC COR # BLD AUTO: 7.04 10*3/MM3 (ref 3.4–10.8)

## 2024-05-02 PROCEDURE — 85025 COMPLETE CBC W/AUTO DIFF WBC: CPT | Performed by: INTERNAL MEDICINE

## 2024-05-02 PROCEDURE — 84443 ASSAY THYROID STIM HORMONE: CPT | Performed by: INTERNAL MEDICINE

## 2024-05-02 PROCEDURE — 25010000002 HEPARIN LOCK FLUSH PER 10 UNITS: Performed by: INTERNAL MEDICINE

## 2024-05-02 PROCEDURE — 82378 CARCINOEMBRYONIC ANTIGEN: CPT | Performed by: INTERNAL MEDICINE

## 2024-05-02 PROCEDURE — 84439 ASSAY OF FREE THYROXINE: CPT | Performed by: INTERNAL MEDICINE

## 2024-05-02 PROCEDURE — 36591 DRAW BLOOD OFF VENOUS DEVICE: CPT

## 2024-05-02 PROCEDURE — 80053 COMPREHEN METABOLIC PANEL: CPT | Performed by: INTERNAL MEDICINE

## 2024-05-02 RX ORDER — SODIUM CHLORIDE 0.9 % (FLUSH) 0.9 %
10 SYRINGE (ML) INJECTION AS NEEDED
OUTPATIENT
Start: 2024-05-02

## 2024-05-02 RX ORDER — SODIUM CHLORIDE 0.9 % (FLUSH) 0.9 %
10 SYRINGE (ML) INJECTION AS NEEDED
Status: DISCONTINUED | OUTPATIENT
Start: 2024-05-02 | End: 2024-05-03 | Stop reason: HOSPADM

## 2024-05-02 RX ORDER — HEPARIN SODIUM (PORCINE) LOCK FLUSH IV SOLN 100 UNIT/ML 100 UNIT/ML
500 SOLUTION INTRAVENOUS AS NEEDED
Status: DISCONTINUED | OUTPATIENT
Start: 2024-05-02 | End: 2024-05-03 | Stop reason: HOSPADM

## 2024-05-02 RX ORDER — HEPARIN SODIUM (PORCINE) LOCK FLUSH IV SOLN 100 UNIT/ML 100 UNIT/ML
500 SOLUTION INTRAVENOUS AS NEEDED
OUTPATIENT
Start: 2024-05-02

## 2024-05-02 RX ADMIN — Medication 10 ML: at 12:13

## 2024-05-02 RX ADMIN — HEPARIN 500 UNITS: 100 SYRINGE at 12:13

## 2024-05-02 NOTE — PROGRESS NOTES
Port accessed for blood collection and routine port flush. Port aspirated and positive blood return noted. 10 ml blood wasted prior to blood for labs being collected. Port flushed with 20 ml NS and heparin 500 units, then de-accessed. Pt tolerated well.

## 2024-05-07 ENCOUNTER — OFFICE VISIT (OUTPATIENT)
Dept: ONCOLOGY | Facility: CLINIC | Age: 49
End: 2024-05-07
Payer: COMMERCIAL

## 2024-05-07 VITALS
RESPIRATION RATE: 14 BRPM | DIASTOLIC BLOOD PRESSURE: 77 MMHG | TEMPERATURE: 97.2 F | OXYGEN SATURATION: 97 % | BODY MASS INDEX: 33.01 KG/M2 | WEIGHT: 217.8 LBS | HEART RATE: 100 BPM | HEIGHT: 68 IN | SYSTOLIC BLOOD PRESSURE: 123 MMHG

## 2024-05-07 DIAGNOSIS — C20 RECTAL CANCER: Primary | ICD-10-CM

## 2024-05-07 PROCEDURE — 99214 OFFICE O/P EST MOD 30 MIN: CPT | Performed by: INTERNAL MEDICINE

## 2024-05-10 NOTE — PROGRESS NOTES
Date: May 13, 2024  Time In: 1106  Time Out: 1158      PROGRESS NOTE  Data:  Yun Mcgowan is a 49 y.o. female who presents today for individual therapy session through the [facility]. This provider is located at the [facility] located at [address] [place] [address] The Patient is seen remotely at her home  using [employer]. Patient is being seen via telehealth and stated they are in a secure environment for this session. The patient's condition being diagnosed/treated is appropriate for telemedicine. The provider identified herself as well as her credentials. The patient consents to be seen remotely, and when consent is given they understand that the consent allows for patient identifiable information to be sent to a third party as needed. They may refuse to be seen remotely at any time. The electronic data is encrypted and password protected, and the patient has been advised of the potential risks to privacy not withstanding such measures.     Patient presents this date for follow up for depression and anxiety      Clinical Maneuvering/Intervention: Yun is pleasant, alert and oriented to person place and time.  She has begun looking for a job to help with her basic needs.  She has found this to be discouraging.  This has decreased her mood and increased her anxiety.  She talked at length about the differences and wanting to work for a corporate organization or smaller organization.  We also talked about everything that she has been through over the past years and the strengths that she has developed and what she has learned about herself because of her experience.    Assisted patient in processing above session content; acknowledged and normalized patient’s thoughts, feelings, and concerns. Rationalized patient thought process regarding reflecting on her strengths and what she has learned about herself. Discussed triggers associated with  patient's depression. Also discussed coping skills for patient to implement such as continuing with skills already built.    Allowed patient to freely discuss issues without interruption or judgment. Provided safe, confidential environment to facilitate the development of positive therapeutic relationship and encourage open, honest communication. Assisted patient in identifying risk factors which would indicate the need for higher level of care including thoughts to harm self or others and/or self-harming behavior and encouraged patient to contact this office, call 911, or present to the nearest emergency room should any of these events occur. Discussed crisis intervention services and means to access. Patient adamantly and convincingly denies current suicidal or homicidal ideation or perceptual disturbance.    Assessment   Patient appears to maintain relative stability as compared to their baseline. However, patient continues to struggle with pression and anxiety which continues to cause impairment in important areas of functioning. A result, they can be reasonably expected to continue to benefit from treatment and would likely be at increased risk for decompensation otherwise.    Mental Status Exam:     Hygiene:   good  Cooperation:  Cooperative  Eye Contact:  Good  Psychomotor Behavior:  Appropriate  Affect:  Appropriate  Mood: Anxious  speech:  Normal  Thought Process:  Goal directed and Linear  Thought Content:  Normal  Suicidal:  None  Homicidal:  None  Hallucinations:  None  Delusion:  None  Memory:  Intact  Orientation:  Person, Place, Time and Situation  Reliability:  good  Insight:  Good  Judgement:  Good  Impulse Control:  Good  Physical/Medical Issues:  See diagnosis list       Patient's Support Network Includes:  significant other     Functional Status: Mild impairment      Progress toward goal: Not at goal     Prognosis: Good with Ongoing Treatment           Plan     Resources: Patient was provided  with the following community resources: None at this time     Patient will continue in individual outpatient therapy with focus on improved functioning and coping skills, maintaining stability, and avoiding decompensation and the need for higher level of care.    Patient will adhere to any medication regimens as prescribed and report any side effects. Patient will contact this office, call 911 or present to the nearest emergency room should suicidal or homicidal ideations occur. Provide cognitive behavioral therapy and solution focused therapy to improve functioning, maintain stability and avoid decompensation and the need for higher level of care.     Return at first available appointment or earlier if symptoms worsen or fail to improve.           VISIT DIAGNOSIS:     ICD-10-CM ICD-9-CM   1. Generalized anxiety disorder  F41.1 300.02   2. Major depressive disorder, recurrent episode, moderate  F33.1 296.32              This document has been electronically signed by NARCISO Woodruff, MARLENE   May 13, 2024 15:14 EDT      Part of this note may be an electronic transcription/translation of spoken language to printed text using the Dragon Dictation System.

## 2024-05-13 ENCOUNTER — TELEMEDICINE (OUTPATIENT)
Dept: PSYCHIATRY | Facility: CLINIC | Age: 49
End: 2024-05-13
Payer: COMMERCIAL

## 2024-05-13 DIAGNOSIS — F41.1 GENERALIZED ANXIETY DISORDER: Primary | ICD-10-CM

## 2024-05-13 DIAGNOSIS — F33.1 MAJOR DEPRESSIVE DISORDER, RECURRENT EPISODE, MODERATE: ICD-10-CM

## 2024-05-13 PROCEDURE — 90834 PSYTX W PT 45 MINUTES: CPT | Performed by: SOCIAL WORKER

## 2024-05-15 DIAGNOSIS — F41.1 GENERALIZED ANXIETY DISORDER WITH PANIC ATTACKS: Chronic | ICD-10-CM

## 2024-05-15 DIAGNOSIS — F33.1 MAJOR DEPRESSIVE DISORDER, RECURRENT EPISODE, MODERATE: Chronic | ICD-10-CM

## 2024-05-15 DIAGNOSIS — F41.0 GENERALIZED ANXIETY DISORDER WITH PANIC ATTACKS: Chronic | ICD-10-CM

## 2024-05-15 RX ORDER — BUPROPION HYDROCHLORIDE 150 MG/1
150 TABLET ORAL EVERY MORNING
Qty: 90 TABLET | Refills: 1 | Status: SHIPPED | OUTPATIENT
Start: 2024-05-15

## 2024-05-28 ENCOUNTER — HOSPITAL ENCOUNTER (OUTPATIENT)
Dept: ONCOLOGY | Facility: HOSPITAL | Age: 49
Discharge: HOME | End: 2024-05-28
Admitting: INTERNAL MEDICINE
Payer: COMMERCIAL

## 2024-05-28 DIAGNOSIS — C20 RECTAL CANCER: Primary | ICD-10-CM

## 2024-05-28 PROCEDURE — 96523 IRRIG DRUG DELIVERY DEVICE: CPT

## 2024-05-28 PROCEDURE — 25010000002 HEPARIN LOCK FLUSH PER 10 UNITS: Performed by: INTERNAL MEDICINE

## 2024-05-28 RX ORDER — SODIUM CHLORIDE 0.9 % (FLUSH) 0.9 %
10 SYRINGE (ML) INJECTION AS NEEDED
OUTPATIENT
Start: 2024-05-28

## 2024-05-28 RX ORDER — SODIUM CHLORIDE 0.9 % (FLUSH) 0.9 %
10 SYRINGE (ML) INJECTION AS NEEDED
Status: DISCONTINUED | OUTPATIENT
Start: 2024-05-28 | End: 2024-05-29 | Stop reason: HOSPADM

## 2024-05-28 RX ORDER — HEPARIN SODIUM (PORCINE) LOCK FLUSH IV SOLN 100 UNIT/ML 100 UNIT/ML
500 SOLUTION INTRAVENOUS AS NEEDED
OUTPATIENT
Start: 2024-05-28

## 2024-05-28 RX ORDER — HEPARIN SODIUM (PORCINE) LOCK FLUSH IV SOLN 100 UNIT/ML 100 UNIT/ML
500 SOLUTION INTRAVENOUS AS NEEDED
Status: DISCONTINUED | OUTPATIENT
Start: 2024-05-28 | End: 2024-05-29 | Stop reason: HOSPADM

## 2024-05-28 RX ADMIN — HEPARIN 500 UNITS: 100 SYRINGE at 13:52

## 2024-05-28 RX ADMIN — Medication 10 ML: at 13:50

## 2024-05-28 NOTE — PROGRESS NOTES
Port accessed and flushed with good blood return noted. Port flushed with saline and heparin prior to needle removal. Pt aware of next appointment.

## 2024-05-29 ENCOUNTER — OFFICE VISIT (OUTPATIENT)
Dept: ENDOCRINOLOGY | Facility: CLINIC | Age: 49
End: 2024-05-29
Payer: COMMERCIAL

## 2024-05-29 ENCOUNTER — OFFICE (AMBULATORY)
Dept: RURAL CLINIC 3 | Facility: CLINIC | Age: 49
End: 2024-05-29
Payer: COMMERCIAL

## 2024-05-29 VITALS
HEIGHT: 69 IN | DIASTOLIC BLOOD PRESSURE: 73 MMHG | WEIGHT: 215 LBS | SYSTOLIC BLOOD PRESSURE: 124 MMHG | HEART RATE: 87 BPM

## 2024-05-29 VITALS
DIASTOLIC BLOOD PRESSURE: 62 MMHG | HEIGHT: 68 IN | BODY MASS INDEX: 32.43 KG/M2 | WEIGHT: 214 LBS | HEART RATE: 85 BPM | SYSTOLIC BLOOD PRESSURE: 120 MMHG | OXYGEN SATURATION: 99 %

## 2024-05-29 DIAGNOSIS — K62.5 HEMORRHAGE OF ANUS AND RECTUM: ICD-10-CM

## 2024-05-29 DIAGNOSIS — E66.9 OBESITY WITH SERIOUS COMORBIDITY, UNSPECIFIED CLASSIFICATION, UNSPECIFIED OBESITY TYPE: ICD-10-CM

## 2024-05-29 DIAGNOSIS — Z85.038 PERSONAL HISTORY OF OTHER MALIGNANT NEOPLASM OF LARGE INTEST: ICD-10-CM

## 2024-05-29 DIAGNOSIS — I10 PRIMARY HYPERTENSION: ICD-10-CM

## 2024-05-29 DIAGNOSIS — K59.00 CONSTIPATION, UNSPECIFIED: ICD-10-CM

## 2024-05-29 DIAGNOSIS — K75.81 NONALCOHOLIC STEATOHEPATITIS (NASH): ICD-10-CM

## 2024-05-29 DIAGNOSIS — E11.65 TYPE 2 DIABETES MELLITUS WITH HYPERGLYCEMIA, WITHOUT LONG-TERM CURRENT USE OF INSULIN: ICD-10-CM

## 2024-05-29 DIAGNOSIS — E89.0 POSTABLATIVE HYPOTHYROIDISM: Primary | ICD-10-CM

## 2024-05-29 DIAGNOSIS — R19.7 DIARRHEA, UNSPECIFIED: ICD-10-CM

## 2024-05-29 DIAGNOSIS — K62.7 RADIATION PROCTITIS: ICD-10-CM

## 2024-05-29 DIAGNOSIS — E78.5 HYPERLIPIDEMIA, UNSPECIFIED HYPERLIPIDEMIA TYPE: ICD-10-CM

## 2024-05-29 PROCEDURE — 99214 OFFICE O/P EST MOD 30 MIN: CPT | Performed by: INTERNAL MEDICINE

## 2024-05-29 RX ORDER — LEVOTHYROXINE SODIUM 137 MCG
137 TABLET ORAL DAILY
Qty: 90 TABLET | Refills: 2 | Status: SHIPPED | OUTPATIENT
Start: 2024-05-29

## 2024-05-29 RX ORDER — DOCUSATE SODIUM 100 MG/1
CAPSULE ORAL
Qty: 60 | Refills: 11 | Status: ACTIVE
Start: 2024-05-29

## 2024-05-29 RX ORDER — DICYCLOMINE HYDROCHLORIDE 10 MG/1
CAPSULE ORAL
Qty: 90 | Refills: 3 | Status: ACTIVE

## 2024-05-29 NOTE — PROGRESS NOTES
-----------------------------------------------------------------  ENDOCRINE CLINIC NOTE  -----------------------------------------------------------------        PATIENT NAME: Yun Mcgowan  PATIENT : 1975 AGE: 49 y.o.  MRN NUMBER: [id number]  PRIMARY CARE: Harley De La Cruz MD    ==========================================================================    CHIEF COMPLAINT: Hypothyroidism  DATE OF SERVICE: 24    ==========================================================================    HPI / SUBJECTIVE    49 y.o. 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  Currently maintained on levothyroxine 137 mcg p.o. daily.  Patient was previously maintained on [place] Thyroid therapy which was switched to levothyroxine.  Tolerating well without any side effects.  Recently had blood work which showed stable thyroid function.  Patient is currently complaining of intermittent throat pain for last 1 month duration with itchiness in the ear and allergies being flared up.  Denies any hearing changes.    ==========================================================================                                                PAST MEDICAL HISTORY    Past Medical History:   Diagnosis Date    Adenocarcinoma of rectum     Adenoma of liver     Alopecia     Anxiety     Diabetes mellitus     Disease of thyroid gland     GERD (gastroesophageal reflux disease)     Graves disease     Hashimoto's thyroiditis     See records Mohini Manzanares DAYANA    Hyperlipidemia     Hypertension     Hyperthyroidism     See records Dr. Domitila Nguyen    Hypothyroidism     See records Dr. Domitila Nguyen    IBS (irritable bowel syndrome)     Type 2 diabetes mellitus     See records Dr. Domitila Nguyen       ==========================================================================    PAST SURGICAL HISTORY    Past Surgical History:   Procedure Laterality Date    CATARACT EXTRACTION  Right 2023    COLONOSCOPY      ILEOSTOMY      LAPAROSCOPIC OOPHORECTOMY Bilateral        ==========================================================================    FAMILY HISTORY    Family History   Problem Relation Age of Onset    Cancer Maternal Grandmother         Colon cancer;         ==========================================================================    SOCIAL HISTORY    Social History     Socioeconomic History    Marital status:      Spouse name: Ryan    Number of children: 0    Highest education level: Bachelor's degree (e.g., BA, AB, BS)   Tobacco Use    Smoking status: Never    Smokeless tobacco: Never   Vaping Use    Vaping status: Never Used   Substance and Sexual Activity    Alcohol use: Yes     Alcohol/week: 2.0 standard drinks of alcohol     Types: 2 Drinks containing 0.5 oz of alcohol per week     Comment: Socially; max 2 drinks/week    Drug use: Yes     Types: Marijuana     Comment: THC gummies    Sexual activity: Yes     Partners: Male     Comment: No contraception--menopausal now and infertility issues       ==========================================================================    MEDICATIONS      Current Outpatient Medications:     acetaminophen (Tylenol 8 Hour) 650 MG 8 hr tablet, Take 2 tablets every 8 hours by oral route., Disp: , Rfl:     ALPRAZolam (XANAX) 0.5 MG tablet, 2, Disp: , Rfl:     AZO CRANBERRY GUMMIES PO, , Disp: , Rfl:     B-D ULTRAFINE III SHORT PEN 31G X 8 MM misc, , Disp: , Rfl:     buPROPion XL (WELLBUTRIN XL) 150 MG 24 hr tablet, TAKE 1 TABLET BY MOUTH EVERY DAY IN THE MORNING, Disp: 90 tablet, Rfl: 1    Continuous Blood Gluc Sensor (FreeStyle Fang 3 Sensor) misc, CHANGE SENSOR EVERY 14 DAYS, Disp: , Rfl:     Dicyclomine HCl (BENTYL IM), 3 times a day., Disp: , Rfl:     fexofenadine (ALLEGRA) 180 MG tablet, Take 1 tablet by mouth Daily., Disp: , Rfl:     gabapentin (NEURONTIN) 300 MG capsule, Take 1 capsule by mouth 3 (Three)  Times a Day., Disp: 90 capsule, Rfl: 1    glucosamine sulfate 500 MG capsule capsule, Take  by mouth 3 (Three) Times a Day With Meals., Disp: , Rfl:     HumaLOG KwikPen 200 UNIT/ML solution pen-injector, INJECT 70 UNITS UNDER THE SKIN 3 (THREE) TIMES A DAY WITH MEALS., Disp: , Rfl:     ibuprofen (ADVIL,MOTRIN) 800 MG tablet, Take  by mouth., Disp: , Rfl:     Insulin Degludec (Tresiba FlexTouch) 200 UNIT/ML solution pen-injector pen injection, Inject 80 Units under the skin into the appropriate area as directed Daily. Pt taking 100 units daily, Disp: , Rfl:     Ivermectin (Soolantra) 1 % cream, , Disp: , Rfl:     Lactase (LACTAID PO), , Disp: , Rfl:     lisinopril (PRINIVIL,ZESTRIL) 10 MG tablet, Take 1 tablet by mouth Daily., Disp: , Rfl:     loperamide (IMODIUM) 2 MG capsule, Take 1 capsule by mouth 3 (Three) Times a Day., Disp: , Rfl:     Melatonin 10 MG tablet, Take 12 mg by mouth Every Night., Disp: , Rfl:     Mounjaro 5 MG/0.5ML solution pen-injector, INJECT 5 MG SUBCUTANEOUSLY EVERY 7 DAYS, Disp: , Rfl:     pantoprazole (PROTONIX) 40 MG EC tablet, Take 1 tablet by mouth Daily., Disp: , Rfl:     promethazine (PHENERGAN) 25 MG tablet, TAKE 1 TABLET BY MOUTH EVERY 4 TO 6 HOURS AS NEEDED, Disp: , Rfl:     rizatriptan (MAXALT) 10 MG tablet, Take 1 tablet by mouth 1 (One) Time As Needed for Migraine. May repeat in 2 hours if needed, Disp: , Rfl:     silver sulfadiazine (SILVADENE, SSD) 1 % cream, Apply 1 application topically to the appropriate area as directed 2 (Two) Times a Day., Disp: 50 g, Rfl: 2    topiramate (TOPAMAX) 50 MG tablet, Take 1 tablet by mouth Every Night., Disp: , Rfl:     vilazodone (VIIBRYD) 40 MG tablet tablet, TAKE 1 TABLET BY MOUTH EVERY DAY WITH DINNER, Disp: 90 tablet, Rfl: 1    vitamin B-12 (CYANOCOBALAMIN) 1000 MCG tablet, Take 1 tablet by mouth Daily., Disp: , Rfl:     vitamin D3 125 MCG (5000 UT) capsule capsule, Take 1 capsule by mouth Daily., Disp: , Rfl:     vitamin E 400 UNIT  capsule, 1 capsule., Disp: , Rfl:     Synthroid 137 MCG tablet, Take 1 tablet by mouth Daily., Disp: 90 tablet, Rfl: 2    vilazodone (VIIBRYD) 20 MG tablet tablet, TAKE 1 TABLET BY MOUTH EVERY DAY (Patient not taking: Reported on 5/29/2024), Disp: 30 tablet, Rfl: 0  No current facility-administered medications for this visit.    ==========================================================================    ALLERGIES    Allergies   Allergen Reactions    Adhesive Tape Unknown - Low Severity, Itching and Rash    Wound Dressing Adhesive Itching     Skin gets itchy and irritated       ==========================================================================    OBJECTIVE    Vitals:    05/29/24 1222   BP: 120/62   Pulse: 85   SpO2: 99%         Body mass index is 32.54 kg/m².     General: Alert, cooperative, no acute distress  Thyroid:  no enlargement/tenderness/palpable nodules    ==========================================================================    LAB EVALUATION    Lab Results   Component Value Date    GLUCOSE 229 (H) 05/02/2024    BUN 10 05/02/2024    CREATININE 0.88 05/02/2024    EGFRIFAFRI >60 12/22/2020    BCR 11.4 05/02/2024    K 4.0 05/02/2024    CO2 23.0 05/02/2024    CALCIUM 9.4 05/02/2024    ALBUMIN 4.1 05/02/2024    LABIL2 1.4 12/22/2020    AST 37 (H) 05/02/2024    ALT 38 (H) 05/02/2024     Lab Results   Component Value Date    HGBA1C 9.2 (H) 11/07/2020     Lab Results   Component Value Date    CREATININE 0.88 05/02/2024     Lab Results   Component Value Date    TSH 3.300 05/02/2024    FREET4 1.06 05/02/2024     ==========================================================================    ASSESSMENT AND PLAN    #Hypothyroidism, post ablative  - Patient continues to be on levothyroxine 137 mcg p.o. daily, brand-name only  - Thyroid function continues to be within acceptable limit  - Repeat thyroid function back again in 6 months time    #T2DM  #Obesity  - Patient is currently following different  "physician for type 2 diabetes management  - Patient wants to switch to my care for type 2 diabetes management once the previous physician retires    # Allergic rhinitis, counseled patient to use nasal saline spray 3 times a day and as needed    #HLD  #HTN  -Care as per primary team    Return to clinic: 6 months    Entire assessment and plan was discussed and counseled the patient in detail to which patient verbalized understanding and agreed with care.  Answered all queries and concerns.    This note was created using voice recognition software and is inherently subject to errors including those of syntax and \"sound-alike\" substitutions which may escape proofreading.  In such instances, original meaning may be extrapolated by contextual derivation.    Note: Portions of this note may have been copied from previous notes but documentation have been reviewed and edited as necessary to support clinical decision making for today's visit.    ==========================================================================    INFORMATION PROVIDED TO PATIENT    Patient Instructions   Please,    #Levothyroxine Dosing:    - Continue brand-name thyroid / levothyroxine tablets 137 mcg by mouth daily at 6:00 AM  - Take it every day, on an empty stomach, 30 minutes before eating  - Avoid taking it with iron, calcium, other medications (blocks absorption), wait at least 4 hrs after taking levothyroxine to take these medications  - If you miss a pill, you can take 2 the next day and return to schedule from next day     - Repeat thyroid work-up lab in 6 months.    Follow up in 6 months time.    Thank you for your visit today.    If you have any questions or concerns please feel free to reach out of the office.       ==========================================================================  Eitan Aranda MD  Department of Endocrine, Diabetes and Metabolism  [facility], " [place]  ==========================================================================

## 2024-05-29 NOTE — PATIENT INSTRUCTIONS
Please,    #Levothyroxine Dosing:    - Continue brand-name thyroid / levothyroxine tablets 137 mcg by mouth daily at 6:00 AM  - Take it every day, on an empty stomach, 30 minutes before eating  - Avoid taking it with iron, calcium, other medications (blocks absorption), wait at least 4 hrs after taking levothyroxine to take these medications  - If you miss a pill, you can take 2 the next day and return to schedule from next day     - Repeat thyroid work-up lab in 6 months.    Follow up in 6 months time.    Thank you for your visit today.    If you have any questions or concerns please feel free to reach out of the office.

## 2024-06-05 ENCOUNTER — OFFICE VISIT (OUTPATIENT)
Dept: PSYCHIATRY | Facility: CLINIC | Age: 49
End: 2024-06-05
Payer: COMMERCIAL

## 2024-06-05 DIAGNOSIS — F41.1 GENERALIZED ANXIETY DISORDER: Primary | ICD-10-CM

## 2024-06-05 DIAGNOSIS — F33.41 RECURRENT MAJOR DEPRESSIVE DISORDER, IN PARTIAL REMISSION: ICD-10-CM

## 2024-06-05 PROCEDURE — 90834 PSYTX W PT 45 MINUTES: CPT | Performed by: SOCIAL WORKER

## 2024-06-17 NOTE — PROGRESS NOTES
Subjective   Yun Mcgowan is a 49 y.o. female who presents today for follow-up for psychiatric medication management.     Chief Complaint:  Depression, anxiety     History of Present Illness:     Medication adjustments last visit:  Continue Wellbutrin XL 150mg.   Continue Viibryd 40mg.     Patient here today for follow up. She states she is doing ok.  She is trying to find a job and states she has some anxiety from this.   She was on disability before but states they kicked her off after a year. She was still having ongoing GI issues, and pain, do despite not being on disability, she was not able to work. She is needing to work now for financial reasons.   She has applied a couple places close to home and hasn't heard back from anyone.   Overall, she feels like medications are doing ok.   Denies any suicidal thoughts.     Patient presents with symptoms and behaviors that are consistent with the following DSM-5 diagnoses:  Major depressive disorder, mod, recurrent  2. Generalized anxiety disorder    The following portions of the patient's history were reviewed and updated as appropriate: allergies, current medications, past family history, past medical history, past social history, past surgical history and problem list.    PAST OUTPATIENT TREATMENT  Diagnosis treated:  Affective Disorder, Anxiety/Panic Disorder  Treatment Type:  Medication Management  Prior Psychiatric Medications:  Xanax - helpful for panic.  Bupropion XL 300mg  Gabapentin  phenteramine - for wt loss  Promethazine - N/V  topomax 25 bid - migraines  Pristiq  Fluoxetine--wasn't helping   Support Groups:  None  Sequelae Of Mental Disorder:  emotional distress    Interval History  Some improvement.     Side Effects  Diarrhea and headaches      Past Medical History:  Past Medical History:   Diagnosis Date    Adenocarcinoma of rectum     Adenoma of liver     Alopecia     Anxiety     Diabetes mellitus     Disease of thyroid gland     GERD  (gastroesophageal reflux disease)     Graves disease     Hashimoto's thyroiditis     See records Mohini Manzanares APRN    Hyperlipidemia     Hypertension     Hyperthyroidism 2005    See records Dr. Domitila Nguyen    Hypothyroidism     See records Dr. Domitila Nguyen    IBS (irritable bowel syndrome)     Type 2 diabetes mellitus     See records Dr. Domitila Nguyen       Social History:  Social History     Socioeconomic History    Marital status:      Spouse name: Ryan    Number of children: 0    Highest education level: Bachelor's degree (e.g., BA, AB, BS)   Tobacco Use    Smoking status: Never    Smokeless tobacco: Never   Vaping Use    Vaping status: Never Used   Substance and Sexual Activity    Alcohol use: Yes     Alcohol/week: 2.0 standard drinks of alcohol     Types: 2 Drinks containing 0.5 oz of alcohol per week     Comment: Socially; max 2 drinks/week    Drug use: Yes     Types: Marijuana     Comment: THC gummies    Sexual activity: Yes     Partners: Male     Comment: No contraception--menopausal now and infertility issues       Family History:  Family History   Problem Relation Age of Onset    Cancer Maternal Grandmother         Colon cancer;         Past Surgical History:  Past Surgical History:   Procedure Laterality Date    CATARACT EXTRACTION Right 2023    COLONOSCOPY      ILEOSTOMY      LAPAROSCOPIC OOPHORECTOMY Bilateral        Problem List:  Patient Active Problem List   Diagnosis    Rectal cancer    Benign neoplasm of liver    Diabetes mellitus    Gastroesophageal reflux disease    Hepatic cirrhosis    Hyperlipidemia    Hypertension    Lipodystrophy    Liver mass    Migraine headache    Obesity    Alopecia    Postablative hypothyroidism    Seasonal allergic rhinitis    Premature atrial contraction    Recurrent major depressive disorder, in partial remission    Generalized anxiety disorder    Adjustment disorder with anxious mood    Major depressive disorder, recurrent episode,  moderate    Abnormal liver enzymes    Altered bowel function    Change in bowel habits    Constipation    Diarrhea    Ascites    Encounter for immunization    Fatty liver    Hematochezia    History of rectal cancer    Irritable bowel syndrome    Major depressive disorder with single episode    Melena    Neoplasm of uncertain behavior of colon    Nonalcoholic steatohepatitis (DAMON)    Noncompliance with medication regimen    Personal history of colonic polyps    Personal history of other malignant neoplasm of rectum, rectosigmoid junction, and anus    Polyp of colon    Hemorrhage of anus and rectum    Rectal bleeding    Rosacea    Seasonal allergies    Other asthma    Sleep apnea    Intestinal obstruction    Unspecified intestinal obstruction, unspecified as to partial versus complete obstruction    Thyroid disease    Shoulder pain    Foreign body in stomach    Gastric foreign body    COVID-19 vaccine administered       Allergy:   Allergies   Allergen Reactions    Adhesive Tape Unknown - Low Severity, Itching and Rash    Wound Dressing Adhesive Itching     Skin gets itchy and irritated        Discontinued Medications:  Medications Discontinued During This Encounter   Medication Reason    vilazodone (VIIBRYD) 20 MG tablet tablet            Current Medications:   Current Outpatient Medications   Medication Sig Dispense Refill    acetaminophen (Tylenol 8 Hour) 650 MG 8 hr tablet Take 2 tablets every 8 hours by oral route.      AZO CRANBERRY GUMMIES PO       B-D ULTRAFINE III SHORT PEN 31G X 8 MM misc       buPROPion XL (WELLBUTRIN XL) 150 MG 24 hr tablet TAKE 1 TABLET BY MOUTH EVERY DAY IN THE MORNING 90 tablet 1    Colace 100 MG capsule 60 capsules.      Continuous Blood Gluc Sensor (FreeStyle Fang 3 Sensor) misc CHANGE SENSOR EVERY 14 DAYS      cycloSPORINE (RESTASIS) 0.05 % ophthalmic emulsion instill 1 drop into both eyes twice a day      dicyclomine (BENTYL) 10 MG capsule Take 1 capsule by mouth 3 (Three) Times a  Day.      fexofenadine (ALLEGRA) 180 MG tablet Take 1 tablet by mouth Daily.      gabapentin (NEURONTIN) 300 MG capsule Take 1 capsule by mouth 3 (Three) Times a Day. 90 capsule 1    glucosamine sulfate 500 MG capsule capsule Take  by mouth 3 (Three) Times a Day With Meals.      HumaLOG KwikPen 200 UNIT/ML solution pen-injector INJECT 70 UNITS UNDER THE SKIN 3 (THREE) TIMES A DAY WITH MEALS.      Insulin Degludec (Tresiba FlexTouch) 200 UNIT/ML solution pen-injector pen injection Inject 80 Units under the skin into the appropriate area as directed Daily. Pt taking 100 units daily      Lactase (LACTAID PO)       lisinopril (PRINIVIL,ZESTRIL) 10 MG tablet Take 1 tablet by mouth Daily.      loperamide (IMODIUM) 2 MG capsule Take 1 capsule by mouth 3 (Three) Times a Day.      Melatonin 10 MG tablet Take 12 mg by mouth Every Night.      Mounjaro 5 MG/0.5ML solution pen-injector INJECT 5 MG SUBCUTANEOUSLY EVERY 7 DAYS      pantoprazole (PROTONIX) 40 MG EC tablet Take 1 tablet by mouth Daily.      promethazine (PHENERGAN) 25 MG tablet TAKE 1 TABLET BY MOUTH EVERY 4 TO 6 HOURS AS NEEDED      rizatriptan (MAXALT) 10 MG tablet Take 1 tablet by mouth 1 (One) Time As Needed for Migraine. May repeat in 2 hours if needed      silver sulfadiazine (SILVADENE, SSD) 1 % cream Apply 1 application topically to the appropriate area as directed 2 (Two) Times a Day. 50 g 2    Synthroid 137 MCG tablet Take 1 tablet by mouth Daily. 90 tablet 2    topiramate (TOPAMAX) 50 MG tablet Take 1 tablet by mouth Every Night.      triamcinolone (KENALOG) 0.1 % ointment APPLY A THIN LAYER TO THE AFFECTED AREA BY TOPICAL ROUTE 2 TIMES PER DAY FOR 14 DAYS      vilazodone (VIIBRYD) 40 MG tablet tablet TAKE 1 TABLET BY MOUTH EVERY DAY WITH DINNER 90 tablet 1    vitamin B-12 (CYANOCOBALAMIN) 1000 MCG tablet Take 1 tablet by mouth Daily.      vitamin D3 125 MCG (5000 UT) capsule capsule Take 1 capsule by mouth Daily.      vitamin E 400 UNIT capsule 1  capsule.      ALPRAZolam (XANAX) 0.5 MG tablet 2 (Patient not taking: Reported on 6/18/2024)      Dicyclomine HCl (BENTYL IM) 3 times a day. (Patient not taking: Reported on 6/18/2024)      ibuprofen (ADVIL,MOTRIN) 800 MG tablet Take  by mouth. (Patient not taking: Reported on 6/18/2024)      Ivermectin (Soolantra) 1 % cream  (Patient not taking: Reported on 6/18/2024)       No current facility-administered medications for this visit.         Psychological ROS: positive for - anxiety and depression  negative for - behavioral disorder, concentration difficulties, decreased libido, disorientation, hallucinations, hostility, irritability, memory difficulties, mood swings, obsessive thoughts, physical abuse, sexual abuse, sleep disturbances or suicidal ideation      Physical Exam:   Blood pressure 124/76, pulse 88, SpO2 97%.    MENTAL STATUS EXAM   General Appearance:  Cleanly groomed and dressed  Eye Contact:  Good eye contact  Attitude:  Cooperative  Motor Activity:  Normal gait, posture  Muscle Strength:  Normal  Speech:  Normal rate, tone, volume  Language:  Spontaneous  Mood and affect:  Depressed  Hopelessness:  Denies  Loneliness: Denies  Thought Process:  Logical  Associations/ Thought Content:  No delusions  Hallucinations:  None  Suicidal Ideations:  Not present  Homicidal Ideation:  Not present  Sensorium:  Alert  Orientation:  Person, place, time and situation  Immediate Recall, Recent, and Remote Memory:  Intact  Attention Span/ Concentration:  Good  Fund of Knowledge:  Appropriate for age and educational level  Intellectual Functioning:  Average range  Insight:  Good  Judgement:  Good  Reliability:  Good  Impulse Control:  Good       PHQ-9 Depression Screening    Little interest or pleasure in doing things? 0-->not at all   Feeling down, depressed, or hopeless? 1-->several days   Trouble falling or staying asleep, or sleeping too much? 1-->several days   Feeling tired or having little energy? 1-->several  days   Poor appetite or overeating? 1-->several days   Feeling bad about yourself - or that you are a failure or have let yourself or your family down? 1-->several days   Trouble concentrating on things, such as reading the newspaper or watching television? 1-->several days   Moving or speaking so slowly that other people could have noticed? Or the opposite - being so fidgety or restless that you have been moving around a lot more than usual? 0-->not at all   Thoughts that you would be better off dead, or of hurting yourself in some way? 0-->not at all   PHQ-9 Total Score 6   If you checked off any problems, how difficult have these problems made it for you to do your work, take care of things at home, or get along with other people? somewhat difficult        Never smoker    I advised Ondrea of the risks of tobacco use.     Result Review:    Labs:  Hospital Outpatient Visit on 05/02/2024   Component Date Value Ref Range Status    TSH 05/02/2024 3.300  0.270 - 4.200 uIU/mL Final    Free T4 05/02/2024 1.06  0.93 - 1.70 ng/dL Final    T4 results may be falsely increased if patient taking Biotin.    Glucose 05/02/2024 229 (H)  65 - 99 mg/dL Final    BUN 05/02/2024 10  6 - 20 mg/dL Final    Creatinine 05/02/2024 0.88  0.57 - 1.00 mg/dL Final    Sodium 05/02/2024 142  136 - 145 mmol/L Final    Potassium 05/02/2024 4.0  3.5 - 5.2 mmol/L Final    Chloride 05/02/2024 106  98 - 107 mmol/L Final    CO2 05/02/2024 23.0  22.0 - 29.0 mmol/L Final    Calcium 05/02/2024 9.4  8.6 - 10.5 mg/dL Final    Total Protein 05/02/2024 7.3  6.0 - 8.5 g/dL Final    Albumin 05/02/2024 4.1  3.5 - 5.2 g/dL Final    ALT (SGPT) 05/02/2024 38 (H)  1 - 33 U/L Final    AST (SGOT) 05/02/2024 37 (H)  1 - 32 U/L Final    Alkaline Phosphatase 05/02/2024 144 (H)  39 - 117 U/L Final    Total Bilirubin 05/02/2024 0.3  0.0 - 1.2 mg/dL Final    Globulin 05/02/2024 3.2  gm/dL Final    A/G Ratio 05/02/2024 1.3  g/dL Final    BUN/Creatinine Ratio 05/02/2024 11.4   7.0 - 25.0 Final    Anion Gap 05/02/2024 13.0  5.0 - 15.0 mmol/L Final    eGFR 05/02/2024 80.7  >60.0 mL/min/1.73 Final    CEA 05/02/2024 1.47  ng/mL Final    WBC 05/02/2024 7.04  3.40 - 10.80 10*3/mm3 Final    RBC 05/02/2024 4.30  3.77 - 5.28 10*6/mm3 Final    Hemoglobin 05/02/2024 12.7  12.0 - 15.9 g/dL Final    Hematocrit 05/02/2024 39.0  34.0 - 46.6 % Final    MCV 05/02/2024 90.7  79.0 - 97.0 fL Final    MCH 05/02/2024 29.5  26.6 - 33.0 pg Final    MCHC 05/02/2024 32.6  31.5 - 35.7 g/dL Final    RDW 05/02/2024 13.7  12.3 - 15.4 % Final    RDW-SD 05/02/2024 44.3  37.0 - 54.0 fl Final    MPV 05/02/2024 10.8  6.0 - 12.0 fL Final    Platelets 05/02/2024 193  140 - 450 10*3/mm3 Final    Neutrophil % 05/02/2024 83.5 (H)  42.7 - 76.0 % Final    Lymphocyte % 05/02/2024 8.2 (L)  19.6 - 45.3 % Final    Monocyte % 05/02/2024 6.7  5.0 - 12.0 % Final    Eosinophil % 05/02/2024 1.3  0.3 - 6.2 % Final    Basophil % 05/02/2024 0.3  0.0 - 1.5 % Final    Neutrophils, Absolute 05/02/2024 5.88  1.70 - 7.00 10*3/mm3 Final    Lymphocytes, Absolute 05/02/2024 0.58 (L)  0.70 - 3.10 10*3/mm3 Final    Monocytes, Absolute 05/02/2024 0.47  0.10 - 0.90 10*3/mm3 Final    Eosinophils, Absolute 05/02/2024 0.09  0.00 - 0.40 10*3/mm3 Final    Basophils, Absolute 05/02/2024 0.02  0.00 - 0.20 10*3/mm3 Final   Orders Only on 04/29/2024   Component Date Value Ref Range Status    TSH 04/29/2024 1.320  0.450 - 4.500 uIU/mL Final       Assessment & Plan   Diagnoses and all orders for this visit:    1. Major depressive disorder, recurrent episode, moderate (Primary)    2. Generalized anxiety disorder      Continue Wellbutrin XL 150mg.   Continue Viibryd 40mg.     Visit Diagnoses:    ICD-10-CM ICD-9-CM   1. Major depressive disorder, recurrent episode, moderate  F33.1 296.32   2. Generalized anxiety disorder  F41.1 300.02         TREATMENT PLAN/GOALS: Continue supportive psychotherapy efforts and medications as indicated. Treatment and medication options  discussed during today's visit. Patient ackowledged and verbally consented to continue with current treatment plan and was educated on the importance of compliance with treatment and follow-up appointments.    MEDICATION ISSUES:  INSPECT reviewed as expected    Discussed medication options and treatment plan of prescribed medication as well as the risks, benefits, and side effects including potential falls, possible impaired driving and metabolic adversities among others. Patient is agreeable to call the office with any worsening of symptoms or onset of side effects. Patient is agreeable to call 911 or go to the nearest ER should he/she begin having SI/HI. No medication side effects or related complaints today.     MEDS ORDERED DURING VISIT:  No orders of the defined types were placed in this encounter.      Return in about 5 months (around 11/18/2024).         This document has been electronically signed by DAYANA Lynch  June 18, 2024 14:15 EDT    Part of this note may be an electronic transcription/translation of spoken language to printed text using the Dragon Dictation System.

## 2024-06-18 ENCOUNTER — OFFICE VISIT (OUTPATIENT)
Dept: PSYCHIATRY | Facility: CLINIC | Age: 49
End: 2024-06-18
Payer: COMMERCIAL

## 2024-06-18 VITALS — SYSTOLIC BLOOD PRESSURE: 124 MMHG | DIASTOLIC BLOOD PRESSURE: 76 MMHG | HEART RATE: 88 BPM | OXYGEN SATURATION: 97 %

## 2024-06-18 DIAGNOSIS — F33.1 MAJOR DEPRESSIVE DISORDER, RECURRENT EPISODE, MODERATE: Primary | Chronic | ICD-10-CM

## 2024-06-18 DIAGNOSIS — F41.1 GENERALIZED ANXIETY DISORDER: Chronic | ICD-10-CM

## 2024-06-18 PROCEDURE — 99214 OFFICE O/P EST MOD 30 MIN: CPT

## 2024-06-18 RX ORDER — CYCLOSPORINE 0.5 MG/ML
EMULSION OPHTHALMIC
COMMUNITY
Start: 2024-06-03

## 2024-06-18 RX ORDER — DOCUSATE SODIUM 100 MG/1
60 CAPSULE ORAL
COMMUNITY
Start: 2024-05-29

## 2024-06-18 RX ORDER — DICYCLOMINE HYDROCHLORIDE 10 MG/1
10 CAPSULE ORAL 3 TIMES DAILY
COMMUNITY

## 2024-06-18 RX ORDER — TRIAMCINOLONE ACETONIDE 1 MG/G
OINTMENT TOPICAL
COMMUNITY
Start: 2024-06-05

## 2024-06-25 ENCOUNTER — HOSPITAL ENCOUNTER (OUTPATIENT)
Dept: ONCOLOGY | Facility: HOSPITAL | Age: 49
Discharge: HOME | End: 2024-06-25
Payer: COMMERCIAL

## 2024-06-25 DIAGNOSIS — C20 RECTAL CANCER: Primary | ICD-10-CM

## 2024-06-25 PROCEDURE — 25010000002 HEPARIN LOCK FLUSH PER 10 UNITS: Performed by: INTERNAL MEDICINE

## 2024-06-25 PROCEDURE — 96523 IRRIG DRUG DELIVERY DEVICE: CPT

## 2024-06-25 RX ORDER — HEPARIN SODIUM (PORCINE) LOCK FLUSH IV SOLN 100 UNIT/ML 100 UNIT/ML
500 SOLUTION INTRAVENOUS AS NEEDED
OUTPATIENT
Start: 2024-06-25

## 2024-06-25 RX ORDER — SODIUM CHLORIDE 0.9 % (FLUSH) 0.9 %
10 SYRINGE (ML) INJECTION AS NEEDED
Status: DISCONTINUED | OUTPATIENT
Start: 2024-06-25 | End: 2024-06-26 | Stop reason: HOSPADM

## 2024-06-25 RX ORDER — SODIUM CHLORIDE 0.9 % (FLUSH) 0.9 %
10 SYRINGE (ML) INJECTION AS NEEDED
OUTPATIENT
Start: 2024-06-25

## 2024-06-25 RX ORDER — HEPARIN SODIUM (PORCINE) LOCK FLUSH IV SOLN 100 UNIT/ML 100 UNIT/ML
500 SOLUTION INTRAVENOUS AS NEEDED
Status: DISCONTINUED | OUTPATIENT
Start: 2024-06-25 | End: 2024-06-26 | Stop reason: HOSPADM

## 2024-06-25 RX ADMIN — Medication 10 ML: at 12:46

## 2024-06-25 RX ADMIN — HEPARIN 500 UNITS: 100 SYRINGE at 12:48

## 2024-07-23 ENCOUNTER — HOSPITAL ENCOUNTER (OUTPATIENT)
Dept: ONCOLOGY | Facility: HOSPITAL | Age: 49
Discharge: HOME | End: 2024-07-23
Admitting: INTERNAL MEDICINE
Payer: COMMERCIAL

## 2024-07-23 DIAGNOSIS — C20 RECTAL CANCER: Primary | ICD-10-CM

## 2024-07-23 PROCEDURE — 96523 IRRIG DRUG DELIVERY DEVICE: CPT

## 2024-07-23 PROCEDURE — 25010000002 HEPARIN LOCK FLUSH PER 10 UNITS: Performed by: INTERNAL MEDICINE

## 2024-07-23 RX ORDER — HEPARIN SODIUM (PORCINE) LOCK FLUSH IV SOLN 100 UNIT/ML 100 UNIT/ML
500 SOLUTION INTRAVENOUS AS NEEDED
Status: DISCONTINUED | OUTPATIENT
Start: 2024-07-23 | End: 2024-07-24 | Stop reason: HOSPADM

## 2024-07-23 RX ORDER — SODIUM CHLORIDE 0.9 % (FLUSH) 0.9 %
10 SYRINGE (ML) INJECTION AS NEEDED
Status: DISCONTINUED | OUTPATIENT
Start: 2024-07-23 | End: 2024-07-24 | Stop reason: HOSPADM

## 2024-07-23 RX ORDER — SODIUM CHLORIDE 0.9 % (FLUSH) 0.9 %
10 SYRINGE (ML) INJECTION AS NEEDED
OUTPATIENT
Start: 2024-07-23

## 2024-07-23 RX ORDER — HEPARIN SODIUM (PORCINE) LOCK FLUSH IV SOLN 100 UNIT/ML 100 UNIT/ML
500 SOLUTION INTRAVENOUS AS NEEDED
OUTPATIENT
Start: 2024-07-23

## 2024-07-23 RX ADMIN — HEPARIN 500 UNITS: 100 SYRINGE at 12:20

## 2024-07-23 RX ADMIN — Medication 10 ML: at 12:18

## 2024-07-23 NOTE — PROGRESS NOTES
Port accessed and flushed with good blood return noted. Port flushed with saline and heparin prior to ne  edle removal. Pt aware of next appointment.

## 2024-08-01 ENCOUNTER — OFFICE VISIT (OUTPATIENT)
Dept: PSYCHIATRY | Facility: CLINIC | Age: 49
End: 2024-08-01
Payer: COMMERCIAL

## 2024-08-01 DIAGNOSIS — F41.1 GENERALIZED ANXIETY DISORDER: Primary | ICD-10-CM

## 2024-08-01 DIAGNOSIS — F33.1 MAJOR DEPRESSIVE DISORDER, RECURRENT EPISODE, MODERATE: ICD-10-CM

## 2024-08-01 NOTE — PROGRESS NOTES
Date: August 1, 2024  Time In: 1110  Time Out: 1204      PROGRESS NOTE  Data:  Yun Mcgowan is a 49 y.o. female who presents today for individual therapy session at [facility] with NARCISO Woodruff LCSW.     Patient Chief Complaint: Follow-up for depression and anxiety     Clinical Maneuvering/Intervention: Yun is pleasant alert and oriented to person place and time.  He has had a difficult time finding a job but did require part-time job.  She is continuing to look for something more appropriate.  She is stressed and overwhelmed regarding their financial situation.  She spoke about some of the dynamics with her  and his family and the stress that this is bringing to her.    Assisted patient in processing above session content; acknowledged and normalized patient’s thoughts, feelings, and concerns. Rationalized patient thought process regarding the importance of good communication between her and her  and setting boundaries with family members.  Discussed triggers associated with patient's depression. Also discussed coping skills for patient to implement such as continuing with skills already built.    Allowed patient to freely discuss issues without interruption or judgment. Provided safe, confidential environment to facilitate the development of positive therapeutic relationship and encourage open, honest communication. Assisted patient in identifying risk factors which would indicate the need for higher level of care including thoughts to harm self or others and/or self-harming behavior and encouraged patient to contact this office, call 911, or present to the nearest emergency room should any of these events occur. Discussed crisis intervention services and means to access. Patient adamantly and convincingly denies current suicidal or homicidal ideation or perceptual disturbance.    Assessment   Patient appears to maintain relative stability as compared to their  baseline. However, patient continues to struggle with pression and anxiety which continues to cause impairment in important areas of functioning. A result, they can be reasonably expected to continue to benefit from treatment and would likely be at increased risk for decompensation otherwise.    Mental Status Exam:     Hygiene:   good  Cooperation:  Cooperative  Eye Contact:  Good  Psychomotor Behavior:  Appropriate  Affect:  Appropriate  Mood: Sad, tearful at times   speech:  Normal  Thought Process:  Goal directed and Linear  Thought Content:  Normal  Suicidal:  None  Homicidal:  None  Hallucinations:  None  Delusion:  None  Memory:  Intact  Orientation:  Person, Place, Time and Situation  Reliability:  good  Insight:  Good  Judgement:  Good  Impulse Control:  Good  Physical/Medical Issues:  See diagnosis list     PHQ-Score Total:  PHQ-9 Total Score: PHQ-9 Depression Screening  Little interest or pleasure in doing things? 1-->several days   Feeling down, depressed, or hopeless? 1-->several days   Trouble falling or staying asleep, or sleeping too much? 1-->several days   Feeling tired or having little energy? 1-->several days   Poor appetite or overeating?     Feeling bad about yourself - or that you are a failure or have let yourself or your family down? 0-->not at all   Trouble concentrating on things, such as reading the newspaper or watching television? 1-->several days   Moving or speaking so slowly that other people could have noticed? Or the opposite - being so fidgety or restless that you have been moving around a lot more than usual? 0-->not at all   Thoughts that you would be better off dead, or of hurting yourself in some way? 0-->not at all   PHQ-9 Total Score 5   If you checked off any problems, how difficult have these problems made it for you to do your work, take care of things at home, or get along with other people?        MARTINE-7 Total Score:   Over the last two weeks, how often have you been  bothered by the following problems?  Feeling nervous, anxious or on edge: Several days  Not being able to stop or control worrying: Several days  Worrying too much about different things: Several days  Trouble Relaxing: Several days  Being so restless that it is hard to sit still: Not at all  Becoming easily annoyed or irritable: Several days  Feeling afraid as if something awful might happen: Not at all  MARTINE 7 Total Score: 5     Patient's Support Network Includes:  significant other     Functional Status: Mild impairment      Progress toward goal: Not at goal     Prognosis: Good with Ongoing Treatment           Plan     Resources: Patient was provided with the following community resources: None at this time     Patient will continue in individual outpatient therapy with focus on improved functioning and coping skills, maintaining stability, and avoiding decompensation and the need for higher level of care.    Patient will adhere to any medication regimens as prescribed and report any side effects. Patient will contact this office, call 911 or present to the nearest emergency room should suicidal or homicidal ideations occur. Provide cognitive behavioral therapy and solution focused therapy to improve functioning, maintain stability and avoid decompensation and the need for higher level of care.     Return at first available appointment or earlier if symptoms worsen or fail to improve.           VISIT DIAGNOSIS:     ICD-10-CM ICD-9-CM   1. Generalized anxiety disorder  F41.1 300.02   2. Major depressive disorder, recurrent episode, moderate  F33.1 296.32                This document has been electronically signed by NARCISO Woodruff, MARLENE   August 1, 2024 12:16 EDT      Part of this note may be an electronic transcription/translation of spoken language to printed text using the Dragon Dictation System.

## 2024-08-14 DIAGNOSIS — F41.0 GENERALIZED ANXIETY DISORDER WITH PANIC ATTACKS: Chronic | ICD-10-CM

## 2024-08-14 DIAGNOSIS — F33.1 MAJOR DEPRESSIVE DISORDER, RECURRENT EPISODE, MODERATE: Chronic | ICD-10-CM

## 2024-08-14 DIAGNOSIS — F41.1 GENERALIZED ANXIETY DISORDER WITH PANIC ATTACKS: Chronic | ICD-10-CM

## 2024-08-14 RX ORDER — VILAZODONE HYDROCHLORIDE 40 MG/1
40 TABLET ORAL
Qty: 90 TABLET | Refills: 1 | Status: SHIPPED | OUTPATIENT
Start: 2024-08-14

## 2024-08-14 NOTE — TELEPHONE ENCOUNTER
Rx Refill Note  Requested Prescriptions     Pending Prescriptions Disp Refills    vilazodone (VIIBRYD) 40 MG tablet tablet [Pharmacy Med Name: VILAZODONE HCL 40 MG TABLET] 90 tablet 1     Sig: TAKE 1 TABLET BY MOUTH EVERY DAY WITH DINNER      Last office visit with prescribing clinician: 6/18/2024   Last telemedicine visit with prescribing clinician: 5/13/2024   Next office visit with prescribing clinician: 11/13/2024   Office Visit with Tarah Tello APRN (06/18/2024)                       Would you like a call back once the refill request has been completed: [] Yes [] No    If the office needs to give you a call back, can they leave a voicemail: [] Yes [] No    Abida Suazo MA  08/14/24, 12:14 EDT

## 2024-08-20 ENCOUNTER — HOSPITAL ENCOUNTER (OUTPATIENT)
Dept: ONCOLOGY | Facility: HOSPITAL | Age: 49
Discharge: HOME | End: 2024-08-20
Admitting: INTERNAL MEDICINE
Payer: COMMERCIAL

## 2024-08-20 DIAGNOSIS — C20 RECTAL CANCER: Primary | ICD-10-CM

## 2024-08-20 LAB
ALBUMIN SERPL-MCNC: 4.1 G/DL (ref 3.5–5.2)
ALBUMIN/GLOB SERPL: 1.4 G/DL
ALP SERPL-CCNC: 137 U/L (ref 39–117)
ALT SERPL W P-5'-P-CCNC: 34 U/L (ref 1–33)
ANION GAP SERPL CALCULATED.3IONS-SCNC: 9.4 MMOL/L (ref 5–15)
AST SERPL-CCNC: 37 U/L (ref 1–32)
BASOPHILS # BLD AUTO: 0.01 10*3/MM3 (ref 0–0.2)
BASOPHILS NFR BLD AUTO: 0.2 % (ref 0–1.5)
BILIRUB SERPL-MCNC: 0.5 MG/DL (ref 0–1.2)
BUN SERPL-MCNC: 10 MG/DL (ref 6–20)
BUN/CREAT SERPL: 12.3 (ref 7–25)
CALCIUM SPEC-SCNC: 9.1 MG/DL (ref 8.6–10.5)
CEA SERPL-MCNC: 1.57 NG/ML
CHLORIDE SERPL-SCNC: 107 MMOL/L (ref 98–107)
CO2 SERPL-SCNC: 24.6 MMOL/L (ref 22–29)
CREAT SERPL-MCNC: 0.81 MG/DL (ref 0.57–1)
DEPRECATED RDW RBC AUTO: 39.8 FL (ref 37–54)
EGFRCR SERPLBLD CKD-EPI 2021: 89.1 ML/MIN/1.73
EOSINOPHIL # BLD AUTO: 0.07 10*3/MM3 (ref 0–0.4)
EOSINOPHIL NFR BLD AUTO: 1.7 % (ref 0.3–6.2)
ERYTHROCYTE [DISTWIDTH] IN BLOOD BY AUTOMATED COUNT: 12.8 % (ref 12.3–15.4)
GLOBULIN UR ELPH-MCNC: 3 GM/DL
GLUCOSE SERPL-MCNC: 175 MG/DL (ref 65–99)
HCT VFR BLD AUTO: 37 % (ref 34–46.6)
HGB BLD-MCNC: 12.6 G/DL (ref 12–15.9)
LYMPHOCYTES # BLD AUTO: 0.55 10*3/MM3 (ref 0.7–3.1)
LYMPHOCYTES NFR BLD AUTO: 13 % (ref 19.6–45.3)
MCH RBC QN AUTO: 29.8 PG (ref 26.6–33)
MCHC RBC AUTO-ENTMCNC: 34.1 G/DL (ref 31.5–35.7)
MCV RBC AUTO: 87.5 FL (ref 79–97)
MONOCYTES # BLD AUTO: 0.25 10*3/MM3 (ref 0.1–0.9)
MONOCYTES NFR BLD AUTO: 5.9 % (ref 5–12)
NEUTROPHILS NFR BLD AUTO: 3.36 10*3/MM3 (ref 1.7–7)
NEUTROPHILS NFR BLD AUTO: 79.2 % (ref 42.7–76)
PLATELET # BLD AUTO: 153 10*3/MM3 (ref 140–450)
PMV BLD AUTO: 11.7 FL (ref 6–12)
POTASSIUM SERPL-SCNC: 3.9 MMOL/L (ref 3.5–5.2)
PROT SERPL-MCNC: 7.1 G/DL (ref 6–8.5)
RBC # BLD AUTO: 4.23 10*6/MM3 (ref 3.77–5.28)
SODIUM SERPL-SCNC: 141 MMOL/L (ref 136–145)
WBC NRBC COR # BLD AUTO: 4.24 10*3/MM3 (ref 3.4–10.8)

## 2024-08-20 PROCEDURE — 25010000002 HEPARIN LOCK FLUSH PER 10 UNITS: Performed by: INTERNAL MEDICINE

## 2024-08-20 PROCEDURE — 82378 CARCINOEMBRYONIC ANTIGEN: CPT | Performed by: INTERNAL MEDICINE

## 2024-08-20 PROCEDURE — 80053 COMPREHEN METABOLIC PANEL: CPT | Performed by: INTERNAL MEDICINE

## 2024-08-20 PROCEDURE — 36591 DRAW BLOOD OFF VENOUS DEVICE: CPT

## 2024-08-20 PROCEDURE — 85025 COMPLETE CBC W/AUTO DIFF WBC: CPT | Performed by: INTERNAL MEDICINE

## 2024-08-20 RX ORDER — HEPARIN SODIUM (PORCINE) LOCK FLUSH IV SOLN 100 UNIT/ML 100 UNIT/ML
500 SOLUTION INTRAVENOUS AS NEEDED
OUTPATIENT
Start: 2024-08-20

## 2024-08-20 RX ORDER — HEPARIN SODIUM (PORCINE) LOCK FLUSH IV SOLN 100 UNIT/ML 100 UNIT/ML
500 SOLUTION INTRAVENOUS AS NEEDED
Status: DISCONTINUED | OUTPATIENT
Start: 2024-08-20 | End: 2024-08-21 | Stop reason: HOSPADM

## 2024-08-20 RX ORDER — SODIUM CHLORIDE 0.9 % (FLUSH) 0.9 %
10 SYRINGE (ML) INJECTION AS NEEDED
Status: DISCONTINUED | OUTPATIENT
Start: 2024-08-20 | End: 2024-08-21 | Stop reason: HOSPADM

## 2024-08-20 RX ORDER — SODIUM CHLORIDE 0.9 % (FLUSH) 0.9 %
10 SYRINGE (ML) INJECTION AS NEEDED
OUTPATIENT
Start: 2024-08-20

## 2024-08-20 RX ADMIN — Medication 10 ML: at 12:22

## 2024-08-20 RX ADMIN — HEPARIN 500 UNITS: 100 SYRINGE at 12:23

## 2024-08-20 NOTE — PROGRESS NOTES
1222 Port accessed and flushed with good blood return noted. 10cc of blood wasted prior to specimen collection. Blood specimen, cbc,cmp,cea obtained and sent to lab for processing per protocol.  Port flushed with saline and heparin prior to needle removal.

## 2024-08-23 ENCOUNTER — HOSPITAL ENCOUNTER (OUTPATIENT)
Dept: PET IMAGING | Facility: HOSPITAL | Age: 49
Discharge: HOME | End: 2024-08-23
Admitting: INTERNAL MEDICINE
Payer: COMMERCIAL

## 2024-08-23 DIAGNOSIS — C20 RECTAL CANCER: ICD-10-CM

## 2024-08-23 PROCEDURE — 74177 CT ABD & PELVIS W/CONTRAST: CPT

## 2024-08-23 PROCEDURE — 71260 CT THORAX DX C+: CPT

## 2024-08-23 PROCEDURE — 25510000001 IOPAMIDOL PER 1 ML: Performed by: INTERNAL MEDICINE

## 2024-08-23 RX ADMIN — IOPAMIDOL 100 ML: 755 INJECTION, SOLUTION INTRAVENOUS at 08:26

## 2024-08-29 ENCOUNTER — OFFICE VISIT (OUTPATIENT)
Dept: PSYCHIATRY | Facility: CLINIC | Age: 49
End: 2024-08-29
Payer: COMMERCIAL

## 2024-08-29 DIAGNOSIS — F41.1 GENERALIZED ANXIETY DISORDER WITH PANIC ATTACKS: ICD-10-CM

## 2024-08-29 DIAGNOSIS — F41.0 GENERALIZED ANXIETY DISORDER WITH PANIC ATTACKS: ICD-10-CM

## 2024-08-29 DIAGNOSIS — F33.1 MAJOR DEPRESSIVE DISORDER, RECURRENT EPISODE, MODERATE: Primary | ICD-10-CM

## 2024-08-29 NOTE — PROGRESS NOTES
Date: August 29, 2024  Time In: 1106  Time Out: 1201      PROGRESS NOTE  Data:  Yun Mcgowan is a 49 y.o. female who presents today for individual therapy session at [facility] with NARCISO Woodruff, MARLENE.     Patient Chief Complaint: Follow-up for depression and anxiety     Clinical Maneuvering/Intervention: Yun is pleasant alert and oriented to person place and time.  She states that she had a recent scan and has had no recurrence of cancer.  They are watching a lung nodule.  This is worrisome for her and she discussed how she knows that she does not have any control over this except to follow through with scans and doctors appointments.  She talked about relationships that she has where she feels like she gives more to people than they give to her.  She also talked about her recent gambling addiction that she found her  has.    Assisted patient in processing above session content; acknowledged and normalized patient’s thoughts, feelings, and concerns. Rationalized patient thought process regarding the importance of good boundaries with family members and friends; of not enabling individuals with an addiction..  Discussed triggers associated with patient's depression. Also discussed coping skills for patient to implement such as continuing with skills already built.    Allowed patient to freely discuss issues without interruption or judgment. Provided safe, confidential environment to facilitate the development of positive therapeutic relationship and encourage open, honest communication. Assisted patient in identifying risk factors which would indicate the need for higher level of care including thoughts to harm self or others and/or self-harming behavior and encouraged patient to contact this office, call 911, or present to the nearest emergency room should any of these events occur. Discussed crisis intervention services and means to access. Patient adamantly and convincingly  denies current suicidal or homicidal ideation or perceptual disturbance.    Assessment   Patient appears to maintain relative stability as compared to their baseline. However, patient continues to struggle with pression and anxiety which continues to cause impairment in important areas of functioning. A result, they can be reasonably expected to continue to benefit from treatment and would likely be at increased risk for decompensation otherwise.    Mental Status Exam:     Hygiene:   good  Cooperation:  Cooperative  Eye Contact:  Good  Psychomotor Behavior:  Appropriate  Affect:  Appropriate  Mood: Sad, tearful at times   speech:  Normal  Thought Process:  Goal directed and Linear  Thought Content:  Normal  Suicidal:  None  Homicidal:  None  Hallucinations:  None  Delusion:  None  Memory:  Intact  Orientation:  Person, Place, Time and Situation  Reliability:  good  Insight:  Good  Judgement:  Good  Impulse Control:  Good  Physical/Medical Issues:  See diagnosis list     PHQ-Score Total:  PHQ-9 Total Score: PHQ-9 Depression Screening  Little interest or pleasure in doing things? 1-->several days   Feeling down, depressed, or hopeless? 0-->not at all   Trouble falling or staying asleep, or sleeping too much? 1-->several days   Feeling tired or having little energy? 1-->several days   Poor appetite or overeating? 0-->not at all   Feeling bad about yourself - or that you are a failure or have let yourself or your family down? 0-->not at all   Trouble concentrating on things, such as reading the newspaper or watching television? 1-->several days   Moving or speaking so slowly that other people could have noticed? Or the opposite - being so fidgety or restless that you have been moving around a lot more than usual? 0-->not at all   Thoughts that you would be better off dead, or of hurting yourself in some way? 0-->not at all   PHQ-9 Total Score 4   If you checked off any problems, how difficult have these problems made it  for you to do your work, take care of things at home, or get along with other people?        MARTINE-7 Total Score:   Over the last two weeks, how often have you been bothered by the following problems?  Feeling nervous, anxious or on edge: More than half the days  Not being able to stop or control worrying: Several days  Worrying too much about different things: Several days  Trouble Relaxing: Several days  Being so restless that it is hard to sit still: Not at all  Becoming easily annoyed or irritable: Not at all  Feeling afraid as if something awful might happen: Not at all  MARTINE 7 Total Score: 5     Patient's Support Network Includes:  significant other     Functional Status: Mild impairment      Progress toward goal: Not at goal     Prognosis: Good with Ongoing Treatment           Plan     Resources: Patient was provided with the following community resources:   Gamblers Anonymous   [phone]  [address] [place] [address] in [place]    Patient will continue in individual outpatient therapy with focus on improved functioning and coping skills, maintaining stability, and avoiding decompensation and the need for higher level of care.    Patient will adhere to any medication regimens as prescribed and report any side effects. Patient will contact this office, call 911 or present to the nearest emergency room should suicidal or homicidal ideations occur. Provide cognitive behavioral therapy and solution focused therapy to improve functioning, maintain stability and avoid decompensation and the need for higher level of care.     Return at first available appointment or earlier if symptoms worsen or fail to improve.           VISIT DIAGNOSIS:     ICD-10-CM ICD-9-CM   1. Major depressive disorder, recurrent episode, moderate  F33.1 296.32   2. Generalized anxiety disorder with panic attacks  F41.1 300.02    F41.0 300.01                  This document has been electronically signed by NARCISO Woodruff, CHERIEW   August 29, 2024  12:06 EDT      Part of this note may be an electronic transcription/translation of spoken language to printed text using the Dragon Dictation System.

## 2024-09-09 ENCOUNTER — APPOINTMENT (OUTPATIENT)
Dept: WOMENS IMAGING | Facility: HOSPITAL | Age: 49
End: 2024-09-09
Payer: COMMERCIAL

## 2024-09-09 PROCEDURE — 77063 BREAST TOMOSYNTHESIS BI: CPT | Performed by: RADIOLOGY

## 2024-09-09 PROCEDURE — 77067 SCR MAMMO BI INCL CAD: CPT | Performed by: RADIOLOGY

## 2024-09-12 DIAGNOSIS — G89.3 NEOPLASM RELATED PAIN: ICD-10-CM

## 2024-09-12 DIAGNOSIS — C20 RECTAL CANCER: ICD-10-CM

## 2024-09-12 RX ORDER — GABAPENTIN 300 MG/1
300 CAPSULE ORAL 3 TIMES DAILY
Qty: 90 CAPSULE | Refills: 1 | OUTPATIENT
Start: 2024-09-12

## 2024-09-16 ENCOUNTER — APPOINTMENT (OUTPATIENT)
Dept: WOMENS IMAGING | Facility: HOSPITAL | Age: 49
End: 2024-09-16
Payer: COMMERCIAL

## 2024-09-16 PROCEDURE — 77065 DX MAMMO INCL CAD UNI: CPT | Performed by: RADIOLOGY

## 2024-09-16 PROCEDURE — G0279 TOMOSYNTHESIS, MAMMO: HCPCS | Performed by: RADIOLOGY

## 2024-09-16 PROCEDURE — 77061 BREAST TOMOSYNTHESIS UNI: CPT | Performed by: RADIOLOGY

## 2024-09-17 ENCOUNTER — HOSPITAL ENCOUNTER (OUTPATIENT)
Dept: ONCOLOGY | Facility: HOSPITAL | Age: 49
Discharge: HOME | End: 2024-09-17
Admitting: INTERNAL MEDICINE
Payer: COMMERCIAL

## 2024-09-17 DIAGNOSIS — C20 RECTAL CANCER: Primary | ICD-10-CM

## 2024-09-17 PROCEDURE — 25010000002 HEPARIN LOCK FLUSH PER 10 UNITS: Performed by: INTERNAL MEDICINE

## 2024-09-17 PROCEDURE — 96523 IRRIG DRUG DELIVERY DEVICE: CPT

## 2024-09-17 RX ORDER — HEPARIN SODIUM (PORCINE) LOCK FLUSH IV SOLN 100 UNIT/ML 100 UNIT/ML
500 SOLUTION INTRAVENOUS AS NEEDED
OUTPATIENT
Start: 2024-09-17

## 2024-09-17 RX ORDER — HEPARIN SODIUM (PORCINE) LOCK FLUSH IV SOLN 100 UNIT/ML 100 UNIT/ML
500 SOLUTION INTRAVENOUS AS NEEDED
Status: DISCONTINUED | OUTPATIENT
Start: 2024-09-17 | End: 2024-09-18 | Stop reason: HOSPADM

## 2024-09-17 RX ORDER — SODIUM CHLORIDE 0.9 % (FLUSH) 0.9 %
10 SYRINGE (ML) INJECTION AS NEEDED
OUTPATIENT
Start: 2024-09-17

## 2024-09-17 RX ORDER — SODIUM CHLORIDE 0.9 % (FLUSH) 0.9 %
10 SYRINGE (ML) INJECTION AS NEEDED
Status: DISCONTINUED | OUTPATIENT
Start: 2024-09-17 | End: 2024-09-18 | Stop reason: HOSPADM

## 2024-09-17 RX ADMIN — Medication 10 ML: at 11:58

## 2024-09-17 RX ADMIN — HEPARIN 500 UNITS: 100 SYRINGE at 12:00

## 2024-09-23 DIAGNOSIS — C20 RECTAL CANCER: ICD-10-CM

## 2024-09-23 DIAGNOSIS — G89.3 NEOPLASM RELATED PAIN: ICD-10-CM

## 2024-09-23 RX ORDER — GABAPENTIN 300 MG/1
300 CAPSULE ORAL 3 TIMES DAILY
Qty: 90 CAPSULE | Refills: 0 | Status: SHIPPED | OUTPATIENT
Start: 2024-09-23

## 2024-09-24 ENCOUNTER — LAB REQUISITION (OUTPATIENT)
Dept: LAB | Facility: HOSPITAL | Age: 49
End: 2024-09-24
Payer: COMMERCIAL

## 2024-09-24 ENCOUNTER — HOSPITAL ENCOUNTER (OUTPATIENT)
Dept: MAMMOGRAPHY | Facility: HOSPITAL | Age: 49
Discharge: HOME | End: 2024-09-24
Payer: COMMERCIAL

## 2024-09-24 ENCOUNTER — APPOINTMENT (OUTPATIENT)
Dept: WOMENS IMAGING | Facility: HOSPITAL | Age: 49
End: 2024-09-24
Payer: COMMERCIAL

## 2024-09-24 DIAGNOSIS — R92.1 MAMMOGRAPHIC CALCIFICATION FOUND ON DIAGNOSTIC IMAGING OF BREAST: ICD-10-CM

## 2024-09-24 DIAGNOSIS — R92.8 ABNORMAL MAMMOGRAM: ICD-10-CM

## 2024-09-24 PROCEDURE — A4648 IMPLANTABLE TISSUE MARKER: HCPCS | Performed by: RADIOLOGY

## 2024-09-24 PROCEDURE — 76098 X-RAY EXAM SURGICAL SPECIMEN: CPT

## 2024-09-24 PROCEDURE — C1819 TISSUE LOCALIZATION-EXCISION: HCPCS | Performed by: RADIOLOGY

## 2024-09-24 PROCEDURE — 19081 BX BREAST 1ST LESION STRTCTC: CPT | Performed by: RADIOLOGY

## 2024-09-24 PROCEDURE — 88305 TISSUE EXAM BY PATHOLOGIST: CPT | Performed by: OBSTETRICS & GYNECOLOGY

## 2024-09-26 ENCOUNTER — OFFICE VISIT (OUTPATIENT)
Dept: PSYCHIATRY | Facility: CLINIC | Age: 49
End: 2024-09-26
Payer: COMMERCIAL

## 2024-09-26 DIAGNOSIS — F41.1 GENERALIZED ANXIETY DISORDER: Primary | ICD-10-CM

## 2024-09-26 DIAGNOSIS — F33.1 MAJOR DEPRESSIVE DISORDER, RECURRENT EPISODE, MODERATE: ICD-10-CM

## 2024-09-26 LAB
CYTO UR: NORMAL
DX PRELIMINARY: NORMAL
LAB AP CASE REPORT: NORMAL
PATH REPORT.FINAL DX SPEC: NORMAL
PATH REPORT.GROSS SPEC: NORMAL

## 2024-10-15 ENCOUNTER — HOSPITAL ENCOUNTER (OUTPATIENT)
Dept: ONCOLOGY | Facility: HOSPITAL | Age: 49
Discharge: HOME | End: 2024-10-15
Admitting: INTERNAL MEDICINE
Payer: COMMERCIAL

## 2024-10-15 DIAGNOSIS — C20 RECTAL CANCER: Primary | ICD-10-CM

## 2024-10-15 PROCEDURE — 25010000002 HEPARIN LOCK FLUSH PER 10 UNITS: Performed by: INTERNAL MEDICINE

## 2024-10-15 PROCEDURE — 96523 IRRIG DRUG DELIVERY DEVICE: CPT

## 2024-10-15 RX ORDER — HEPARIN SODIUM (PORCINE) LOCK FLUSH IV SOLN 100 UNIT/ML 100 UNIT/ML
500 SOLUTION INTRAVENOUS AS NEEDED
Status: DISCONTINUED | OUTPATIENT
Start: 2024-10-15 | End: 2024-10-16 | Stop reason: HOSPADM

## 2024-10-15 RX ORDER — HEPARIN SODIUM (PORCINE) LOCK FLUSH IV SOLN 100 UNIT/ML 100 UNIT/ML
500 SOLUTION INTRAVENOUS AS NEEDED
OUTPATIENT
Start: 2024-10-15

## 2024-10-15 RX ORDER — SODIUM CHLORIDE 0.9 % (FLUSH) 0.9 %
10 SYRINGE (ML) INJECTION AS NEEDED
Status: DISCONTINUED | OUTPATIENT
Start: 2024-10-15 | End: 2024-10-16 | Stop reason: HOSPADM

## 2024-10-15 RX ORDER — SODIUM CHLORIDE 0.9 % (FLUSH) 0.9 %
10 SYRINGE (ML) INJECTION AS NEEDED
OUTPATIENT
Start: 2024-10-15

## 2024-10-15 RX ADMIN — Medication 500 UNITS: at 13:48

## 2024-10-15 RX ADMIN — Medication 10 ML: at 13:47

## 2024-10-15 NOTE — PROGRESS NOTES
1347 Port accessed and flushed with good blood return noted.  Port flushed with saline and heparin prior to needle removal.

## 2024-10-24 ENCOUNTER — OFFICE VISIT (OUTPATIENT)
Dept: PSYCHIATRY | Facility: CLINIC | Age: 49
End: 2024-10-24
Payer: COMMERCIAL

## 2024-10-24 DIAGNOSIS — F41.1 GENERALIZED ANXIETY DISORDER: Primary | ICD-10-CM

## 2024-10-24 NOTE — PROGRESS NOTES
Date: October 24, 2024  Time In: 1112  Time Out: 1201      PROGRESS NOTE  Data:  Yun Mcgowan is a 49 y.o. female who presents today for individual therapy session at [facility] with NARCISO Woodruff, MARLENE.     Patient Chief Complaint: Follow-up for depression and anxiety     Clinical Maneuvering/Intervention: Yun is pleasant alert and oriented to person place and time.  She talked at length about her mom's stay in rehab and her mom's verbalizations of how Minesh should have just let her die during her critical illness.  We discussed her feelings and how best to cope with this subject should it come up again.    Assisted patient in processing above session content; acknowledged and normalized patient’s thoughts, feelings, and concerns. Rationalized patient thought process regarding the importance being supportive and empathetic to others while not taking on their emotional burdens.  [place] setting with her mom.  Discussed triggers associated with patient's depression. Also discussed coping skills for patient to implement such as continuing with skills already built.    Allowed patient to freely discuss issues without interruption or judgment. Provided safe, confidential environment to facilitate the development of positive therapeutic relationship and encourage open, honest communication. Assisted patient in identifying risk factors which would indicate the need for higher level of care including thoughts to harm self or others and/or self-harming behavior and encouraged patient to contact this office, call 911, or present to the nearest emergency room should any of these events occur. Discussed crisis intervention services and means to access. Patient adamantly and convincingly denies current suicidal or homicidal ideation or perceptual disturbance.    Assessment   Patient appears to maintain relative stability as compared to their baseline. However, patient continues to struggle with  pression and anxiety which continues to cause impairment in important areas of functioning. A result, they can be reasonably expected to continue to benefit from treatment and would likely be at increased risk for decompensation otherwise.    Mental Status Exam:     Hygiene:   good  Cooperation:  Cooperative  Eye Contact:  Good  Psychomotor Behavior:  Appropriate  Affect:  Appropriate  Mood: Sad, tearful    speech:  Normal  Thought Process:  Goal directed and Linear  Thought Content:  Normal  Suicidal:  None  Homicidal:  None  Hallucinations:  None  Delusion:  None  Memory:  Intact  Orientation:  Person, Place, Time and Situation  Reliability:  good  Insight:  Good  Judgement:  Good  Impulse Control:  Good  Physical/Medical Issues:  See diagnosis list     PHQ-9 Depression Screening  Little interest or pleasure in doing things? Several days   Feeling down, depressed, or hopeless? Several days   PHQ-2 Total Score 2   Trouble falling or staying asleep, or sleeping too much? Over half   Feeling tired or having little energy? Several days   Poor appetite or overeating? Several days   Feeling bad about yourself - or that you are a failure or have let yourself or your family down? Several days   Trouble concentrating on things, such as reading the newspaper or watching television? Several days   Moving or speaking so slowly that other people could have noticed? Or the opposite - being so fidgety or restless that you have been moving around a lot more than usual? Not at all   Thoughts that you would be better off dead, or of hurting yourself in some way? Not at all   PHQ-9 Total Score 8   If you checked off any problems, how difficult have these problems made it for you to do your work, take care of things at home, or get along with other people?           MARTINE-7 Total Score:   Over the last two weeks, how often have you been bothered by the following problems?  Feeling nervous, anxious or on edge: More than half the  days  Not being able to stop or control worrying: More than half the days  Worrying too much about different things: More than half the days  Trouble Relaxing: More than half the days  Being so restless that it is hard to sit still: Several days  Becoming easily annoyed or irritable: Several days  Feeling afraid as if something awful might happen: Several days  MARTINE 7 Total Score: 11     Patient's Support Network Includes:  significant other     Functional Status: Mild impairment      Progress toward goal: Not at goal     Prognosis: Good with Ongoing Treatment           Plan     Resources: Patient was provided with the following community resources:     Patient will continue in individual outpatient therapy with focus on improved functioning and coping skills, maintaining stability, and avoiding decompensation and the need for higher level of care.    Patient will adhere to any medication regimens as prescribed and report any side effects. Patient will contact this office, call 911 or present to the nearest emergency room should suicidal or homicidal ideations occur. Provide cognitive behavioral therapy and solution focused therapy to improve functioning, maintain stability and avoid decompensation and the need for higher level of care.     Return at first available appointment or earlier if symptoms worsen or fail to improve.           VISIT DIAGNOSIS:     ICD-10-CM ICD-9-CM   1. Generalized anxiety disorder  F41.1 300.02                      This document has been electronically signed by NARCISO Woodruff, MARLENE   October 24, 2024 12:32 EDT      Part of this note may be an electronic transcription/translation of spoken language to printed text using the Dragon Dictation System.

## 2024-10-29 PROBLEM — Z85.038 PERSONAL HISTORY OF COLON CANCER: Status: ACTIVE | Noted: 2023-11-14

## 2024-11-06 LAB
T4 FREE SERPL-MCNC: 1.14 NG/DL (ref 0.82–1.77)
TSH SERPL DL<=0.005 MIU/L-ACNC: 2 UIU/ML (ref 0.45–4.5)

## 2024-11-11 ENCOUNTER — HOSPITAL ENCOUNTER (OUTPATIENT)
Dept: ONCOLOGY | Facility: HOSPITAL | Age: 49
Discharge: HOME | End: 2024-11-11
Admitting: INTERNAL MEDICINE
Payer: COMMERCIAL

## 2024-11-11 ENCOUNTER — OFFICE VISIT (OUTPATIENT)
Dept: ENDOCRINOLOGY | Facility: CLINIC | Age: 49
End: 2024-11-11
Payer: COMMERCIAL

## 2024-11-11 VITALS
HEIGHT: 68 IN | SYSTOLIC BLOOD PRESSURE: 106 MMHG | HEART RATE: 96 BPM | DIASTOLIC BLOOD PRESSURE: 58 MMHG | BODY MASS INDEX: 31.67 KG/M2 | WEIGHT: 209 LBS | OXYGEN SATURATION: 97 %

## 2024-11-11 DIAGNOSIS — C20 RECTAL CANCER: Primary | ICD-10-CM

## 2024-11-11 DIAGNOSIS — E11.65 TYPE 2 DIABETES MELLITUS WITH HYPERGLYCEMIA, WITHOUT LONG-TERM CURRENT USE OF INSULIN: ICD-10-CM

## 2024-11-11 DIAGNOSIS — E78.5 HYPERLIPIDEMIA, UNSPECIFIED HYPERLIPIDEMIA TYPE: ICD-10-CM

## 2024-11-11 DIAGNOSIS — I10 PRIMARY HYPERTENSION: ICD-10-CM

## 2024-11-11 DIAGNOSIS — E89.0 POSTABLATIVE HYPOTHYROIDISM: Primary | ICD-10-CM

## 2024-11-11 LAB
ALBUMIN SERPL-MCNC: 4.1 G/DL (ref 3.5–5.2)
ALBUMIN/GLOB SERPL: 1.2 G/DL
ALP SERPL-CCNC: 139 U/L (ref 39–117)
ALT SERPL W P-5'-P-CCNC: 30 U/L (ref 1–33)
ANION GAP SERPL CALCULATED.3IONS-SCNC: 8.8 MMOL/L (ref 5–15)
AST SERPL-CCNC: 34 U/L (ref 1–32)
BASOPHILS # BLD AUTO: 0.01 10*3/MM3 (ref 0–0.2)
BASOPHILS NFR BLD AUTO: 0.2 % (ref 0–1.5)
BILIRUB SERPL-MCNC: 0.3 MG/DL (ref 0–1.2)
BUN SERPL-MCNC: 13 MG/DL (ref 6–20)
BUN/CREAT SERPL: 15.5 (ref 7–25)
CALCIUM SPEC-SCNC: 9.3 MG/DL (ref 8.6–10.5)
CEA SERPL-MCNC: 1.28 NG/ML
CHLORIDE SERPL-SCNC: 107 MMOL/L (ref 98–107)
CO2 SERPL-SCNC: 24.2 MMOL/L (ref 22–29)
CREAT SERPL-MCNC: 0.84 MG/DL (ref 0.57–1)
DEPRECATED RDW RBC AUTO: 40.9 FL (ref 37–54)
EGFRCR SERPLBLD CKD-EPI 2021: 85.3 ML/MIN/1.73
EOSINOPHIL # BLD AUTO: 0.07 10*3/MM3 (ref 0–0.4)
EOSINOPHIL NFR BLD AUTO: 1.4 % (ref 0.3–6.2)
ERYTHROCYTE [DISTWIDTH] IN BLOOD BY AUTOMATED COUNT: 13.2 % (ref 12.3–15.4)
GLOBULIN UR ELPH-MCNC: 3.4 GM/DL
GLUCOSE SERPL-MCNC: 105 MG/DL (ref 65–99)
HCT VFR BLD AUTO: 37 % (ref 34–46.6)
HGB BLD-MCNC: 12.3 G/DL (ref 12–15.9)
LYMPHOCYTES # BLD AUTO: 0.73 10*3/MM3 (ref 0.7–3.1)
LYMPHOCYTES NFR BLD AUTO: 14.3 % (ref 19.6–45.3)
MCH RBC QN AUTO: 29.1 PG (ref 26.6–33)
MCHC RBC AUTO-ENTMCNC: 33.2 G/DL (ref 31.5–35.7)
MCV RBC AUTO: 87.5 FL (ref 79–97)
MONOCYTES # BLD AUTO: 0.4 10*3/MM3 (ref 0.1–0.9)
MONOCYTES NFR BLD AUTO: 7.9 % (ref 5–12)
NEUTROPHILS NFR BLD AUTO: 3.88 10*3/MM3 (ref 1.7–7)
NEUTROPHILS NFR BLD AUTO: 76.2 % (ref 42.7–76)
PLATELET # BLD AUTO: 192 10*3/MM3 (ref 140–450)
PMV BLD AUTO: 11.3 FL (ref 6–12)
POTASSIUM SERPL-SCNC: 4.1 MMOL/L (ref 3.5–5.2)
PROT SERPL-MCNC: 7.5 G/DL (ref 6–8.5)
RBC # BLD AUTO: 4.23 10*6/MM3 (ref 3.77–5.28)
SODIUM SERPL-SCNC: 140 MMOL/L (ref 136–145)
WBC NRBC COR # BLD AUTO: 5.09 10*3/MM3 (ref 3.4–10.8)

## 2024-11-11 PROCEDURE — 82378 CARCINOEMBRYONIC ANTIGEN: CPT | Performed by: INTERNAL MEDICINE

## 2024-11-11 PROCEDURE — 25010000002 HEPARIN LOCK FLUSH PER 10 UNITS: Performed by: INTERNAL MEDICINE

## 2024-11-11 PROCEDURE — 36591 DRAW BLOOD OFF VENOUS DEVICE: CPT

## 2024-11-11 PROCEDURE — 80053 COMPREHEN METABOLIC PANEL: CPT | Performed by: INTERNAL MEDICINE

## 2024-11-11 PROCEDURE — 85025 COMPLETE CBC W/AUTO DIFF WBC: CPT | Performed by: INTERNAL MEDICINE

## 2024-11-11 PROCEDURE — 99214 OFFICE O/P EST MOD 30 MIN: CPT | Performed by: INTERNAL MEDICINE

## 2024-11-11 RX ORDER — SODIUM CHLORIDE 0.9 % (FLUSH) 0.9 %
10 SYRINGE (ML) INJECTION AS NEEDED
OUTPATIENT
Start: 2024-11-11

## 2024-11-11 RX ORDER — OXYBUTYNIN CHLORIDE 10 MG/1
10 TABLET, EXTENDED RELEASE ORAL DAILY
COMMUNITY
Start: 2024-09-09

## 2024-11-11 RX ORDER — HEPARIN SODIUM (PORCINE) LOCK FLUSH IV SOLN 100 UNIT/ML 100 UNIT/ML
500 SOLUTION INTRAVENOUS AS NEEDED
OUTPATIENT
Start: 2024-11-11

## 2024-11-11 RX ORDER — LEVOTHYROXINE SODIUM 137 MCG
137 TABLET ORAL DAILY
Qty: 90 TABLET | Refills: 3 | Status: SHIPPED | OUTPATIENT
Start: 2024-11-11

## 2024-11-11 RX ORDER — TIRZEPATIDE 7.5 MG/.5ML
INJECTION, SOLUTION SUBCUTANEOUS
COMMUNITY
Start: 2024-10-21

## 2024-11-11 RX ORDER — SODIUM CHLORIDE 0.9 % (FLUSH) 0.9 %
10 SYRINGE (ML) INJECTION AS NEEDED
Status: DISCONTINUED | OUTPATIENT
Start: 2024-11-11 | End: 2024-11-12 | Stop reason: HOSPADM

## 2024-11-11 RX ORDER — HEPARIN SODIUM (PORCINE) LOCK FLUSH IV SOLN 100 UNIT/ML 100 UNIT/ML
500 SOLUTION INTRAVENOUS AS NEEDED
Status: DISCONTINUED | OUTPATIENT
Start: 2024-11-11 | End: 2024-11-12 | Stop reason: HOSPADM

## 2024-11-11 RX ADMIN — HEPARIN 500 UNITS: 100 SYRINGE at 14:22

## 2024-11-11 RX ADMIN — Medication 10 ML: at 14:22

## 2024-11-11 NOTE — PROGRESS NOTES
-----------------------------------------------------------------  ENDOCRINE CLINIC NOTE  -----------------------------------------------------------------        PATIENT NAME: Yun Mcgowan  PATIENT : 1975 AGE: 49 y.o.  MRN NUMBER: [id number]  PRIMARY CARE: Cherelle De La Cruz PA    ==========================================================================    CHIEF COMPLAINT: Hypothyroidism  DATE OF SERVICE: 24    ==========================================================================    HPI / SUBJECTIVE    49 y.o. 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  Currently maintained on levothyroxine 137 mcg p.o. daily.  Patient was previously maintained on [place] Thyroid therapy which was switched to levothyroxine.  Tolerating well without any side effects.  Recently had blood work done in 2024 which showed TSH and free T4 within normal limits.    ==========================================================================                                                PAST MEDICAL HISTORY    Past Medical History:   Diagnosis Date    Adenocarcinoma of rectum     Adenoma of liver     Alopecia     Anxiety     Diabetes mellitus     Disease of thyroid gland     GERD (gastroesophageal reflux disease)     Graves disease     Hashimoto's thyroiditis     See records Mohini Glenna DAYANA    Hyperlipidemia     Hypertension     Hyperthyroidism     See records Dr. Domitila Nguyen    Hypothyroidism     See records Dr. Domitila Nguyen    IBS (irritable bowel syndrome)     Type 2 diabetes mellitus     See records Dr. Domitila Nguyen       ==========================================================================    PAST SURGICAL HISTORY    Past Surgical History:   Procedure Laterality Date    CATARACT EXTRACTION Right 2023    COLONOSCOPY      ILEOSTOMY      LAPAROSCOPIC OOPHORECTOMY Bilateral         ==========================================================================    FAMILY HISTORY    Family History   Problem Relation Age of Onset    Cancer Maternal Grandmother         Colon cancer;         ==========================================================================    SOCIAL HISTORY    Social History     Socioeconomic History    Marital status:      Spouse name: Ryan    Number of children: 0    Highest education level: Bachelor's degree (e.g., BA, AB, BS)   Tobacco Use    Smoking status: Never    Smokeless tobacco: Never   Vaping Use    Vaping status: Never Used   Substance and Sexual Activity    Alcohol use: Yes     Alcohol/week: 2.0 standard drinks of alcohol     Types: 2 Drinks containing 0.5 oz of alcohol per week     Comment: Socially; max 2 drinks/week    Drug use: Yes     Types: Marijuana     Comment: THC gummies    Sexual activity: Yes     Partners: Male     Comment: No contraception--menopausal now and infertility issues       ==========================================================================    MEDICATIONS      Current Outpatient Medications:     acetaminophen (Tylenol 8 Hour) 650 MG 8 hr tablet, Take 2 tablets every 8 hours by oral route., Disp: , Rfl:     B-D ULTRAFINE III SHORT PEN 31G X 8 MM misc, , Disp: , Rfl:     buPROPion XL (WELLBUTRIN XL) 150 MG 24 hr tablet, TAKE 1 TABLET BY MOUTH EVERY DAY IN THE MORNING, Disp: 90 tablet, Rfl: 1    Continuous Blood Gluc Sensor (FreeStyle Fang 3 Sensor) misc, CHANGE SENSOR EVERY 14 DAYS, Disp: , Rfl:     cycloSPORINE (RESTASIS) 0.05 % ophthalmic emulsion, instill 1 drop into both eyes twice a day, Disp: , Rfl:     dicyclomine (BENTYL) 10 MG capsule, Take 1 capsule by mouth 3 (Three) Times a Day., Disp: , Rfl:     fexofenadine (ALLEGRA) 180 MG tablet, Take 1 tablet by mouth Daily., Disp: , Rfl:     gabapentin (NEURONTIN) 300 MG capsule, Take 1 capsule by mouth 3 (Three) Times a Day., Disp: 90 capsule, Rfl: 0     glucosamine sulfate 500 MG capsule capsule, Take  by mouth 3 (Three) Times a Day With Meals., Disp: , Rfl:     HumaLOG KwikPen 200 UNIT/ML solution pen-injector, INJECT 70 UNITS UNDER THE SKIN 3 (THREE) TIMES A DAY WITH MEALS., Disp: , Rfl:     Insulin Degludec (Tresiba FlexTouch) 200 UNIT/ML solution pen-injector pen injection, Inject 80 Units under the skin into the appropriate area as directed Daily. Pt taking 100 units daily, Disp: , Rfl:     Ivermectin (Soolantra) 1 % cream, , Disp: , Rfl:     Lactase (LACTAID PO), , Disp: , Rfl:     lisinopril (PRINIVIL,ZESTRIL) 10 MG tablet, Take 1 tablet by mouth Daily., Disp: , Rfl:     loperamide (IMODIUM) 2 MG capsule, Take 1 capsule by mouth 3 (Three) Times a Day., Disp: , Rfl:     Melatonin 10 MG tablet, Take 12 mg by mouth Every Night., Disp: , Rfl:     Mounjaro 7.5 MG/0.5ML solution auto-injector, INJECT 7.5 MG UNDER THE SKIN 1 (ONE) TIME PER WEEK., Disp: , Rfl:     oxybutynin XL (DITROPAN-XL) 10 MG 24 hr tablet, Take 1 tablet by mouth Daily., Disp: , Rfl:     pantoprazole (PROTONIX) 40 MG EC tablet, Take 1 tablet by mouth Daily., Disp: , Rfl:     promethazine (PHENERGAN) 25 MG tablet, TAKE 1 TABLET BY MOUTH EVERY 4 TO 6 HOURS AS NEEDED, Disp: , Rfl:     rizatriptan (MAXALT) 10 MG tablet, Take 1 tablet by mouth 1 (One) Time As Needed for Migraine. May repeat in 2 hours if needed, Disp: , Rfl:     silver sulfadiazine (SILVADENE, SSD) 1 % cream, Apply 1 application topically to the appropriate area as directed 2 (Two) Times a Day., Disp: 50 g, Rfl: 2    Synthroid 137 MCG tablet, Take 1 tablet by mouth Daily., Disp: 90 tablet, Rfl: 2    topiramate (TOPAMAX) 50 MG tablet, Take 1 tablet by mouth Every Night., Disp: , Rfl:     triamcinolone (KENALOG) 0.1 % ointment, APPLY A THIN LAYER TO THE AFFECTED AREA BY TOPICAL ROUTE 2 TIMES PER DAY FOR 14 DAYS, Disp: , Rfl:     vilazodone (VIIBRYD) 40 MG tablet tablet, TAKE 1 TABLET BY MOUTH EVERY DAY WITH DINNER, Disp: 90 tablet,  Rfl: 1    vitamin B-12 (CYANOCOBALAMIN) 1000 MCG tablet, Take 1 tablet by mouth Daily., Disp: , Rfl:     vitamin D3 125 MCG (5000 UT) capsule capsule, Take 1 capsule by mouth Daily., Disp: , Rfl:     vitamin E 400 UNIT capsule, 1 capsule., Disp: , Rfl:     ==========================================================================    ALLERGIES    Allergies   Allergen Reactions    Adhesive Tape Unknown - Low Severity, Itching and Rash    Wound Dressing Adhesive Itching     Skin gets itchy and irritated       ==========================================================================    OBJECTIVE    Vitals:    11/11/24 1220   BP: 106/58   Pulse: 96   SpO2: 97%         Body mass index is 31.78 kg/m².     General: Alert, cooperative, no acute distress  Thyroid:  no enlargement/tenderness/palpable nodules    ==========================================================================    LAB EVALUATION    Lab Results   Component Value Date    GLUCOSE 175 (H) 08/20/2024    BUN 10 08/20/2024    CREATININE 0.81 08/20/2024    EGFRIFAFRI >60 12/22/2020    BCR 12.3 08/20/2024    K 3.9 08/20/2024    CO2 24.6 08/20/2024    CALCIUM 9.1 08/20/2024    ALBUMIN 4.1 08/20/2024    LABIL2 1.4 12/22/2020    AST 37 (H) 08/20/2024    ALT 34 (H) 08/20/2024    TRIG 109 11/05/2024    HDL 46 11/05/2024    LDL 70 11/05/2024     Lab Results   Component Value Date    HGBA1C 9.2 (H) 11/07/2020     Lab Results   Component Value Date    CREATININE 0.81 08/20/2024     Lab Results   Component Value Date    TSH 2.000 11/05/2024    FREET4 1.14 11/05/2024     ==========================================================================    ASSESSMENT AND PLAN    # Hypothyroidism, post ablative  - Continues levothyroxine 137 mcg p.o. daily, brand-name only  - Thyroid function continues to be within acceptable limit  - Repeat thyroid function back again in 6 months time     # Type 2 Diabetes Mellitus  # Obesity  - Patient is currently following different physician  "for type 2 diabetes management (Dr. Domitila Nguyen)  - Patient wants to switch to my care for type 2 diabetes management once the previous physician retires    # Allergic rhinitis, counseled patient to use nasal saline spray 3 times a day and as needed    #HLD  #HTN  - Care as per primary team    Return to clinic: 6 months    Entire assessment and plan was discussed and counseled the patient in detail to which patient verbalized understanding and agreed with care.  Answered all queries and concerns.    This note was created using voice recognition software and is inherently subject to errors including those of syntax and \"sound-alike\" substitutions which may escape proofreading.  In such instances, original meaning may be extrapolated by contextual derivation.    Note: Portions of this note may have been copied from previous notes but documentation have been reviewed and edited as necessary to support clinical decision making for today's visit.    ==========================================================================    INFORMATION PROVIDED TO PATIENT    Patient Instructions   Please,    - Continue brand-name thyroid / levothyroxine tablets 137 mcg by mouth daily at 6:00 AM  - Take it every day, on an empty stomach, 30 minutes before eating  - Avoid taking it with iron, calcium, other medications (blocks absorption), wait at least 4 hrs after taking levothyroxine to take these medications  - If you miss a pill, you can take 2 the next day and return to schedule from next day     - Repeat thyroid work-up lab in 6 months.    Follow up in 6 months time.    Thank you for your visit today.    If you have any questions or concerns please feel free to reach out of the office.       ==========================================================================  Eitan Aranda MD  Department of Endocrine, Diabetes and Metabolism  [facility], " [place]  ==========================================================================

## 2024-11-11 NOTE — PATIENT INSTRUCTIONS
Please,    - Continue brand-name thyroid / levothyroxine tablets 137 mcg by mouth daily at 6:00 AM  - Take it every day, on an empty stomach, 30 minutes before eating  - Avoid taking it with iron, calcium, other medications (blocks absorption), wait at least 4 hrs after taking levothyroxine to take these medications  - If you miss a pill, you can take 2 the next day and return to schedule from next day     - Repeat thyroid work-up lab in 6 months.    Follow up in 6 months time.    Thank you for your visit today.    If you have any questions or concerns please feel free to reach out of the office.

## 2024-11-11 NOTE — PROGRESS NOTES
Pt to injection chair for PF and labs prior to appt with Dr. Hanks next week. Port accessed and flushed with good blood return noted. 10cc of blood wasted prior to specimen collection. Blood specimen obtained and sent to lab for processing per protocol.  Port flushed with saline and heparin prior to needle removal. Message to ARIN Rizzo for lab orders. Received and lab made aware. Pt states she may need to call and adjust next weeks appt do to jury duty. Scheduling phone number given. Pt discharged home.

## 2024-11-12 ENCOUNTER — TELEPHONE (OUTPATIENT)
Dept: ONCOLOGY | Facility: CLINIC | Age: 49
End: 2024-11-12

## 2024-11-12 DIAGNOSIS — F41.0 GENERALIZED ANXIETY DISORDER WITH PANIC ATTACKS: Chronic | ICD-10-CM

## 2024-11-12 DIAGNOSIS — F33.1 MAJOR DEPRESSIVE DISORDER, RECURRENT EPISODE, MODERATE: Chronic | ICD-10-CM

## 2024-11-12 DIAGNOSIS — F41.1 GENERALIZED ANXIETY DISORDER WITH PANIC ATTACKS: Chronic | ICD-10-CM

## 2024-11-12 RX ORDER — BUPROPION HYDROCHLORIDE 150 MG/1
150 TABLET ORAL EVERY MORNING
Qty: 90 TABLET | Refills: 1 | Status: SHIPPED | OUTPATIENT
Start: 2024-11-12

## 2024-11-12 NOTE — TELEPHONE ENCOUNTER
Caller: Yun Mcgowan    Relationship: Self    Best call back number:[phone]    What is the best time to reach you: NO NEED    Who are you requesting to speak with (clinical staff, provider,  specific staff member): GAYE     What was the call regarding: GAYE, PLEASE DISREGARD VM PT LEFT YOU TODAY. (11/12/24)    Is it okay if the provider responds through [employer]: NO

## 2024-11-13 ENCOUNTER — OFFICE VISIT (OUTPATIENT)
Dept: PSYCHIATRY | Facility: CLINIC | Age: 49
End: 2024-11-13
Payer: COMMERCIAL

## 2024-11-13 DIAGNOSIS — F41.1 GENERALIZED ANXIETY DISORDER WITH PANIC ATTACKS: Chronic | ICD-10-CM

## 2024-11-13 DIAGNOSIS — F41.0 GENERALIZED ANXIETY DISORDER WITH PANIC ATTACKS: Chronic | ICD-10-CM

## 2024-11-13 DIAGNOSIS — F33.1 MAJOR DEPRESSIVE DISORDER, RECURRENT EPISODE, MODERATE: Primary | Chronic | ICD-10-CM

## 2024-11-13 NOTE — PROGRESS NOTES
[facility]   [address], [address]  [place] [address] [phone]  Tarah Tello, MSN, APRN, PMHNP-BC    Subjective   Yun Mcgowan is a 49 y.o. female who presents today for follow-up for psychiatric medication management.     Chief Complaint:  Depression, anxiety     History of Present Illness:     Medication adjustments last visit:  Continue Wellbutrin XL 150mg.   Continue Viibryd 40mg.     11/13/24: Patient here for follow up today. She says she has a lot of life stressors, that make it hard.   Her mother was very ill in September, and had to have life-saving surgery. States they prepared her to say goodbye, but she pulled through. Her mother is 85. She is in rehab currently, but hoping she will get to come home by Thanksgiving.   Denies any suicidal thoughts.   She is seeing Marsha still, about once a month. She feels like this is going well.   Will plan for 6 month follow up.     618/24:  Patient here today for follow up. She states she is doing ok.  She is trying to find a job and states she has some anxiety from this.   She was on disability before but states they kicked her off after a year. She was still having ongoing GI issues, and pain, do despite not being on disability, she was not able to work. She is needing to work now for financial reasons.   She has applied a couple places close to home and hasn't heard back from anyone.   Overall, she feels like medications are doing ok.   Denies any suicidal thoughts.     Patient presents with symptoms and behaviors that are consistent with the following DSM-5 diagnoses:  Major depressive disorder, mod, recurrent  2. Generalized anxiety disorder    The following portions of the patient's history were reviewed and updated as appropriate: allergies, current medications, past family history, past medical history, past social history, past surgical history and problem list.    PAST OUTPATIENT  TREATMENT  Diagnosis treated:  Affective Disorder, Anxiety/Panic Disorder  Treatment Type:  Medication Management  Prior Psychiatric Medications:  Xanax - helpful for panic.  Bupropion XL 300mg  Gabapentin  phenteramine - for wt loss  Promethazine - N/V  topomax 25 bid - migraines  Pristiq  Fluoxetine--wasn't helping   Support Groups:  None  Sequelae Of Mental Disorder:  emotional distress    Interval History  Some improvement.     Side Effects  Diarrhea and headaches      Past Medical History:  Past Medical History:   Diagnosis Date    Adenocarcinoma of rectum     Adenoma of liver     Alopecia     Anxiety     Diabetes mellitus     Disease of thyroid gland     GERD (gastroesophageal reflux disease)     Graves disease     Hashimoto's thyroiditis     See records Mohini ANNE    Hyperlipidemia     Hypertension     Hyperthyroidism     See records Dr. Domitila Nguyen    Hypothyroidism     See records Dr. Domitila Nguyen    IBS (irritable bowel syndrome)     Type 2 diabetes mellitus     See records Dr. Domitila Nguyen       Social History:  Social History     Socioeconomic History    Marital status:      Spouse name: Ryan    Number of children: 0    Highest education level: Bachelor's degree (e.g., BA, AB, BS)   Tobacco Use    Smoking status: Never    Smokeless tobacco: Never   Vaping Use    Vaping status: Never Used   Substance and Sexual Activity    Alcohol use: Yes     Alcohol/week: 2.0 standard drinks of alcohol     Types: 2 Drinks containing 0.5 oz of alcohol per week     Comment: Socially; max 2 drinks/week    Drug use: Yes     Types: Marijuana     Comment: THC gummies    Sexual activity: Yes     Partners: Male     Comment: No contraception--menopausal now and infertility issues       Family History:  Family History   Problem Relation Age of Onset    Cancer Maternal Grandmother         Colon cancer;         Past Surgical History:  Past Surgical History:   Procedure Laterality Date    CATARACT  EXTRACTION Right 07/24/2023    COLONOSCOPY      ILEOSTOMY  2022    LAPAROSCOPIC OOPHORECTOMY Bilateral        Problem List:  Patient Active Problem List   Diagnosis    Rectal cancer    Benign neoplasm of liver    Diabetes mellitus    Gastroesophageal reflux disease    Hepatic cirrhosis    Hyperlipidemia    Hypertension    Lipodystrophy    Liver mass    Migraine headache    Obesity    Alopecia    Postablative hypothyroidism    Seasonal allergic rhinitis    Premature atrial contraction    Recurrent major depressive disorder, in partial remission    Generalized anxiety disorder    Adjustment disorder with anxious mood    Major depressive disorder, recurrent episode, moderate    Abnormal liver enzymes    Altered bowel function    Change in bowel habits    Constipation    Diarrhea    Ascites    Encounter for immunization    Fatty liver    Hematochezia    History of rectal cancer    Irritable bowel syndrome    Major depressive disorder with single episode    Melena    Neoplasm of uncertain behavior of colon    Nonalcoholic steatohepatitis (DAMON)    Noncompliance with medication regimen    Personal history of colonic polyps    Personal history of other malignant neoplasm of rectum, rectosigmoid junction, and anus    Polyp of colon    Hemorrhage of anus and rectum    Rectal bleeding    Rosacea    Seasonal allergies    Other asthma    Sleep apnea    Intestinal obstruction    Unspecified intestinal obstruction, unspecified as to partial versus complete obstruction    Thyroid disease    Shoulder pain    Foreign body in stomach    Gastric foreign body    COVID-19 vaccine administered       Allergy:   Allergies   Allergen Reactions    Adhesive Tape Unknown - Low Severity, Itching and Rash    Wound Dressing Adhesive Itching     Skin gets itchy and irritated        Discontinued Medications:  There are no discontinued medications.    Current Medications:   Current Outpatient Medications   Medication Sig Dispense Refill     acetaminophen (Tylenol 8 Hour) 650 MG 8 hr tablet Take 2 tablets every 8 hours by oral route.      B-D ULTRAFINE III SHORT PEN 31G X 8 MM misc       buPROPion XL (WELLBUTRIN XL) 150 MG 24 hr tablet TAKE 1 TABLET BY MOUTH EVERY DAY IN THE MORNING 90 tablet 1    Continuous Blood Gluc Sensor (FreeStyle Fang 3 Sensor) misc CHANGE SENSOR EVERY 14 DAYS      cycloSPORINE (RESTASIS) 0.05 % ophthalmic emulsion instill 1 drop into both eyes twice a day      dicyclomine (BENTYL) 10 MG capsule Take 1 capsule by mouth 3 (Three) Times a Day.      fexofenadine (ALLEGRA) 180 MG tablet Take 1 tablet by mouth Daily.      gabapentin (NEURONTIN) 300 MG capsule Take 1 capsule by mouth 3 (Three) Times a Day. 90 capsule 0    glucosamine sulfate 500 MG capsule capsule Take  by mouth 3 (Three) Times a Day With Meals.      HumaLOG KwikPen 200 UNIT/ML solution pen-injector INJECT 70 UNITS UNDER THE SKIN 3 (THREE) TIMES A DAY WITH MEALS.      Insulin Degludec (Tresiba FlexTouch) 200 UNIT/ML solution pen-injector pen injection Inject 80 Units under the skin into the appropriate area as directed Daily. Pt taking 100 units daily      Ivermectin (Soolantra) 1 % cream       Lactase (LACTAID PO)       lisinopril (PRINIVIL,ZESTRIL) 10 MG tablet Take 1 tablet by mouth Daily.      loperamide (IMODIUM) 2 MG capsule Take 1 capsule by mouth 3 (Three) Times a Day.      Melatonin 10 MG tablet Take 12 mg by mouth Every Night.      Mounjaro 7.5 MG/0.5ML solution auto-injector INJECT 7.5 MG UNDER THE SKIN 1 (ONE) TIME PER WEEK.      oxybutynin XL (DITROPAN-XL) 10 MG 24 hr tablet Take 1 tablet by mouth Daily.      pantoprazole (PROTONIX) 40 MG EC tablet Take 1 tablet by mouth Daily.      promethazine (PHENERGAN) 25 MG tablet TAKE 1 TABLET BY MOUTH EVERY 4 TO 6 HOURS AS NEEDED      rizatriptan (MAXALT) 10 MG tablet Take 1 tablet by mouth 1 (One) Time As Needed for Migraine. May repeat in 2 hours if needed      silver sulfadiazine (SILVADENE, SSD) 1 % cream  Apply 1 application topically to the appropriate area as directed 2 (Two) Times a Day. 50 g 2    Synthroid 137 MCG tablet Take 1 tablet by mouth Daily. 90 tablet 3    topiramate (TOPAMAX) 50 MG tablet Take 1 tablet by mouth Every Night.      triamcinolone (KENALOG) 0.1 % ointment APPLY A THIN LAYER TO THE AFFECTED AREA BY TOPICAL ROUTE 2 TIMES PER DAY FOR 14 DAYS      vilazodone (VIIBRYD) 40 MG tablet tablet TAKE 1 TABLET BY MOUTH EVERY DAY WITH DINNER 90 tablet 1    vitamin B-12 (CYANOCOBALAMIN) 1000 MCG tablet Take 1 tablet by mouth Daily.      vitamin D3 125 MCG (5000 UT) capsule capsule Take 1 capsule by mouth Daily.      vitamin E 400 UNIT capsule 1 capsule.       No current facility-administered medications for this visit.       Physical Exam:   There were no vitals taken for this visit.    MENTAL STATUS EXAM   General Appearance:  Cleanly groomed and dressed  Eye Contact:  Good eye contact  Attitude:  Cooperative  Motor Activity:  Normal gait, posture  Muscle Strength:  Normal  Speech:  Normal rate, tone, volume  Language:  Spontaneous  Mood and affect:  Anxious and depressed  Hopelessness:  Denies  Loneliness: Denies  Thought Process:  Logical and goal-directed  Associations/ Thought Content:  No delusions  Hallucinations:  None  Suicidal Ideations:  Not present  Homicidal Ideation:  Not present  Sensorium:  Alert  Orientation:  Person, place and time  Immediate Recall, Recent, and Remote Memory:  Intact  Attention Span/ Concentration:  Good  Fund of Knowledge:  Appropriate for age and educational level  Intellectual Functioning:  Average range  Insight:  Good  Judgement:  Good  Reliability:  Good  Impulse Control:  Good      PHQ-9    Little interest or pleasure in doing things? Several days   Feeling down, depressed, or hopeless? Several days   Trouble falling or staying asleep, or sleeping too much? Several days   Feeling tired or having little energy? Several days   Poor appetite or overeating? Several  days   Feeling bad about yourself - or that you are a failure or have let yourself or your family down? Several days   Trouble concentrating on things, such as reading the newspaper or watching television? Several days   Moving or speaking so slowly that other people could have noticed? Or the opposite - being so fidgety or restless that you have been moving around a lot more than usual? Not at all   Thoughts that you would be better off dead, or of hurting yourself in some way? Not at all   PHQ-9 Total Score 7   If you checked off any problems, how difficult have these problems made it for you to do your work, take care of things at home, or get along with other people? Very difficult      GAD7 Documentation:  Feeling nervous, anxious or on edge 1   Not being able to stop or control worrying 1   Worrying too much about different things 1   Trouble relaxing 1   Being so restless that it is hard to sit still 0   Becoming easily annoyed or irritable 0   Feeling Afraid as if something awful might happen 0   MARTINE Total Score 4   How difficult have these problems made it for you? Somewhat difficult        Never smoker    I advised Ondrea of the risks of tobacco use.     Result Review:    Labs:  Hospital Outpatient Visit on 11/11/2024   Component Date Value Ref Range Status    Glucose 11/11/2024 105 (H)  65 - 99 mg/dL Final    BUN 11/11/2024 13  6 - 20 mg/dL Final    Creatinine 11/11/2024 0.84  0.57 - 1.00 mg/dL Final    Sodium 11/11/2024 140  136 - 145 mmol/L Final    Potassium 11/11/2024 4.1  3.5 - 5.2 mmol/L Final    Chloride 11/11/2024 107  98 - 107 mmol/L Final    CO2 11/11/2024 24.2  22.0 - 29.0 mmol/L Final    Calcium 11/11/2024 9.3  8.6 - 10.5 mg/dL Final    Total Protein 11/11/2024 7.5  6.0 - 8.5 g/dL Final    Albumin 11/11/2024 4.1  3.5 - 5.2 g/dL Final    ALT (SGPT) 11/11/2024 30  1 - 33 U/L Final    AST (SGOT) 11/11/2024 34 (H)  1 - 32 U/L Final    Alkaline Phosphatase 11/11/2024 139 (H)  39 - 117 U/L Final     Total Bilirubin 11/11/2024 0.3  0.0 - 1.2 mg/dL Final    Globulin 11/11/2024 3.4  gm/dL Final    A/G Ratio 11/11/2024 1.2  g/dL Final    BUN/Creatinine Ratio 11/11/2024 15.5  7.0 - 25.0 Final    Anion Gap 11/11/2024 8.8  5.0 - 15.0 mmol/L Final    eGFR 11/11/2024 85.3  >60.0 mL/min/1.73 Final    CEA 11/11/2024 1.28  ng/mL Final    WBC 11/11/2024 5.09  3.40 - 10.80 10*3/mm3 Final    RBC 11/11/2024 4.23  3.77 - 5.28 10*6/mm3 Final    Hemoglobin 11/11/2024 12.3  12.0 - 15.9 g/dL Final    Hematocrit 11/11/2024 37.0  34.0 - 46.6 % Final    MCV 11/11/2024 87.5  79.0 - 97.0 fL Final    MCH 11/11/2024 29.1  26.6 - 33.0 pg Final    MCHC 11/11/2024 33.2  31.5 - 35.7 g/dL Final    RDW 11/11/2024 13.2  12.3 - 15.4 % Final    RDW-SD 11/11/2024 40.9  37.0 - 54.0 fl Final    MPV 11/11/2024 11.3  6.0 - 12.0 fL Final    Platelets 11/11/2024 192  140 - 450 10*3/mm3 Final    Neutrophil % 11/11/2024 76.2 (H)  42.7 - 76.0 % Final    Lymphocyte % 11/11/2024 14.3 (L)  19.6 - 45.3 % Final    Monocyte % 11/11/2024 7.9  5.0 - 12.0 % Final    Eosinophil % 11/11/2024 1.4  0.3 - 6.2 % Final    Basophil % 11/11/2024 0.2  0.0 - 1.5 % Final    Neutrophils, Absolute 11/11/2024 3.88  1.70 - 7.00 10*3/mm3 Final    Lymphocytes, Absolute 11/11/2024 0.73  0.70 - 3.10 10*3/mm3 Final    Monocytes, Absolute 11/11/2024 0.40  0.10 - 0.90 10*3/mm3 Final    Eosinophils, Absolute 11/11/2024 0.07  0.00 - 0.40 10*3/mm3 Final    Basophils, Absolute 11/11/2024 0.01  0.00 - 0.20 10*3/mm3 Final   Orders Only on 11/05/2024   Component Date Value Ref Range Status    TSH 11/05/2024 2.000  0.450 - 4.500 uIU/mL Final    Free T4 11/05/2024 1.14  0.82 - 1.77 ng/dL Final   Lab Requisition on 09/24/2024   Component Date Value Ref Range Status    Case Report 09/24/2024    Final                    Value:Surgical Pathology Report                         Case: [id number]                                  Authorizing Provider:  Cat Amezcua MD          Collected:            09/24/2024 11:37 AM          Ordering Location:     [facility]  Received:            09/24/2024 12:54 PM                                 LABORATORY                                                                   Pathologist:           Yanique Monzon MD                                                          Specimen:    Breast, Left, Left breast for calcs LOQ/ tophat, 12 cores, 9 gauge  Ex 1127 Formalin                1137                                                                                       Final Diagnosis 09/24/2024    Final                    Value:This result contains rich text formatting which cannot be displayed here.    Preliminary Diagnosis 09/24/2024    Final                    Value:This result contains rich text formatting which cannot be displayed here.    Gross Description 09/24/2024    Final                    Value:This result contains rich text formatting which cannot be displayed here.    Microscopic Description 09/24/2024    Final                    Value:This result contains rich text formatting which cannot be displayed here.   Hospital Outpatient Visit on 08/20/2024   Component Date Value Ref Range Status    Glucose 08/20/2024 175 (H)  65 - 99 mg/dL Final    BUN 08/20/2024 10  6 - 20 mg/dL Final    Creatinine 08/20/2024 0.81  0.57 - 1.00 mg/dL Final    Sodium 08/20/2024 141  136 - 145 mmol/L Final    Potassium 08/20/2024 3.9  3.5 - 5.2 mmol/L Final    Chloride 08/20/2024 107  98 - 107 mmol/L Final    CO2 08/20/2024 24.6  22.0 - 29.0 mmol/L Final    Calcium 08/20/2024 9.1  8.6 - 10.5 mg/dL Final    Total Protein 08/20/2024 7.1  6.0 - 8.5 g/dL Final    Albumin 08/20/2024 4.1  3.5 - 5.2 g/dL Final    ALT (SGPT) 08/20/2024 34 (H)  1 - 33 U/L Final    AST (SGOT) 08/20/2024 37 (H)  1 - 32 U/L Final    Alkaline Phosphatase 08/20/2024 137 (H)  39 - 117 U/L Final    Total Bilirubin 08/20/2024 0.5  0.0 - 1.2 mg/dL Final    Globulin 08/20/2024 3.0  gm/dL Final    A/G  Ratio 08/20/2024 1.4  g/dL Final    BUN/Creatinine Ratio 08/20/2024 12.3  7.0 - 25.0 Final    Anion Gap 08/20/2024 9.4  5.0 - 15.0 mmol/L Final    eGFR 08/20/2024 89.1  >60.0 mL/min/1.73 Final    CEA 08/20/2024 1.57  ng/mL Final    WBC 08/20/2024 4.24  3.40 - 10.80 10*3/mm3 Final    RBC 08/20/2024 4.23  3.77 - 5.28 10*6/mm3 Final    Hemoglobin 08/20/2024 12.6  12.0 - 15.9 g/dL Final    Hematocrit 08/20/2024 37.0  34.0 - 46.6 % Final    MCV 08/20/2024 87.5  79.0 - 97.0 fL Final    MCH 08/20/2024 29.8  26.6 - 33.0 pg Final    MCHC 08/20/2024 34.1  31.5 - 35.7 g/dL Final    RDW 08/20/2024 12.8  12.3 - 15.4 % Final    RDW-SD 08/20/2024 39.8  37.0 - 54.0 fl Final    MPV 08/20/2024 11.7  6.0 - 12.0 fL Final    Platelets 08/20/2024 153  140 - 450 10*3/mm3 Final    Neutrophil % 08/20/2024 79.2 (H)  42.7 - 76.0 % Final    Lymphocyte % 08/20/2024 13.0 (L)  19.6 - 45.3 % Final    Monocyte % 08/20/2024 5.9  5.0 - 12.0 % Final    Eosinophil % 08/20/2024 1.7  0.3 - 6.2 % Final    Basophil % 08/20/2024 0.2  0.0 - 1.5 % Final    Neutrophils, Absolute 08/20/2024 3.36  1.70 - 7.00 10*3/mm3 Final    Lymphocytes, Absolute 08/20/2024 0.55 (L)  0.70 - 3.10 10*3/mm3 Final    Monocytes, Absolute 08/20/2024 0.25  0.10 - 0.90 10*3/mm3 Final    Eosinophils, Absolute 08/20/2024 0.07  0.00 - 0.40 10*3/mm3 Final    Basophils, Absolute 08/20/2024 0.01  0.00 - 0.20 10*3/mm3 Final       Assessment & Plan   Diagnoses and all orders for this visit:    1. Major depressive disorder, recurrent episode, moderate (Primary)    2. Generalized anxiety disorder with panic attacks      Continue Wellbutrin XL 150mg.   Continue Viibryd 40mg.     Visit Diagnoses:    ICD-10-CM ICD-9-CM   1. Major depressive disorder, recurrent episode, moderate  F33.1 296.32   2. Generalized anxiety disorder with panic attacks  F41.1 300.02    F41.0 300.01       TREATMENT PLAN/GOALS: Continue supportive psychotherapy efforts and medications as indicated. Treatment and medication  options discussed during today's visit. Patient ackowledged and verbally consented to continue with current treatment plan and was educated on the importance of compliance with treatment and follow-up appointments.    MEDICATION ISSUES:  INSPECT reviewed as expected    Discussed medication options and treatment plan of prescribed medication as well as the risks, benefits, and side effects including potential falls, possible impaired driving and metabolic adversities among others. Patient is agreeable to call the office with any worsening of symptoms or onset of side effects. Patient is agreeable to call 911 or go to the nearest ER should he/she begin having SI/HI. No medication side effects or related complaints today.     MEDS ORDERED DURING VISIT:  No orders of the defined types were placed in this encounter.      Return in about 6 months (around 5/13/2025) for 30 minute follow up .         This document has been electronically signed by DAYANA Lynch  November 13, 2024 14:01 EST    Part of this note may be an electronic transcription/translation of spoken language to printed text using the Dragon Dictation System.

## 2024-11-15 NOTE — PROGRESS NOTES
HEMATOLOGY ONCOLOGY OUTPATIENT FOLLOW UP       Patient name: Yun Mcgowan  : 1975  MRN: [id number]  Primary Care Physician: Cherelle De La Cruz PA  Referring Physician: Harley De La Cruz MD  Reason For Consult: History of rectal cancer, splenomegaly with Tamayo cirrhosis, iron overload with H63D heterozygous mutation      History of Present Illness:  Patient is a 49 y.o. 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  3 started having lower abdominal pain    2022 MRI of the abdomen with hepatic cirrhosis, stable exophytic heterogenous mass in the right lobe of the liver and stable focus of abnormal signal with apparent enhancement at the right inferior margin of the left lobe of the liver.  These findings were stable as compared to previous scans.  She did have microwave ablation done in 2022 colonoscopy with Dr. Bowman showing ulcerated nonbleeding 5 cm mass of the malignant appearance in the distal rectum scope was able to traverse the lesion there were polyps in the distal sigmoid colon, ascending colon mid transverse colon.    Biopsy showed invasive moderate differentiated adenocarcinoma.  Mismatch repair proteins were all intact no loss of nuclear expression.    CT chest and pelvis with no definitive mass within the rectum 7 mm left perirectal lymph node, possibly metastatic no evidence of distant metastasis hepatic cirrhosis seen    She was seen by colorectal surgery with recommendation for total neoadjuvant treatment for stage T3b N2a lesion.    2022 patient started with cycle 1 day 1 of FOLFOX  2022 patient completed cycle 8 of neoadjuvant FOLFOX    2022 patient was initiated on 5-FU leucovorin with concurrent radiation  Patient received 25 fractions of a total dose of 4500  centigrade  Patient received radiation boost to the pelvic area with total dose of 540 cGy    1/4/2023 MRI of pelvis with and without contrast revealed T3 stable primary tumor, local mesorectal and presacral nodes with decreased size several new enhancing foci in the sacrum and left acetabulum with concern for metastatic disease  Underwent flex sigmoidoscopy with Dr. Jimenez with no mass felt treatment changes seen    1/24/2023 PET/CT with no abnormal hypermetabolic activity to confirm any viability of cancer    1/25/2023 patient underwent robotic low anterior resection with stapled coloanal anastomosis with full mobilization of splenic flexure and diverting loop ileostomy.  Pathology revealed ypT2 N0 greatest dimension 1.1 cm, histology gradient G1 invasion into muscularis propria with LVI not identified.  No loss of expression of mismatch repair proteins    Patient has since been followed with surveillance imaging    10/24/2023 CT chest abdomen pelvis with contrast revealed interval rectal resection with surgical anastomosis.  New presacral centric soft tissue technique thought to represent sequela of postsurgical and radiation therapy changes.  Previously described left pelvic cystic lesion appeared to significantly be diminished in size now with rind of soft tissue thickening centered in the left adnexal region.  Cirrhotic liver morphology    3/5/24 - CT No convincing evidence of recurrent malignancy or metastatic disease in the chest, abdomen, pelvis, or imaged skeleton.     5/2/24 - CEA 1.47,     Patient also history of right breast fibroadenoma continue mammogram    Her last colonoscopy was in November 2023 with Dr. Bowman which was unrevealing  Flex sig with Dr. Napoles with radiation proctitis    She now is established with Dr Laguna in December 2024. For colonoscopy  in december 20204      Subjective:    Patient is here today for follow-up.  She is scheduled for colonoscopy December 2024 with   Duane        Past Medical History:   Diagnosis Date    Adenocarcinoma of rectum     Adenoma of liver     Alopecia     Anxiety     Diabetes mellitus     Disease of thyroid gland     GERD (gastroesophageal reflux disease)     Graves disease     Hashimoto's thyroiditis 2020    See records Mohini ANNE    Hyperlipidemia     Hypertension     Hyperthyroidism 2005    See records Dr. Domitila Nguyen    Hypothyroidism     See records Dr. Domitila Nguyen    IBS (irritable bowel syndrome)     Type 2 diabetes mellitus 2000    See records Dr. Domitila Nguyen       Past Surgical History:   Procedure Laterality Date    CATARACT EXTRACTION Right 07/24/2023    COLONOSCOPY      ILEOSTOMY  2022    LAPAROSCOPIC OOPHORECTOMY Bilateral          Current Outpatient Medications:     acetaminophen (Tylenol 8 Hour) 650 MG 8 hr tablet, Take 2 tablets every 8 hours by oral route., Disp: , Rfl:     B-D ULTRAFINE III SHORT PEN 31G X 8 MM misc, , Disp: , Rfl:     buPROPion XL (WELLBUTRIN XL) 150 MG 24 hr tablet, TAKE 1 TABLET BY MOUTH EVERY DAY IN THE MORNING, Disp: 90 tablet, Rfl: 1    Continuous Blood Gluc Sensor (FreeStyle Fang 3 Sensor) misc, CHANGE SENSOR EVERY 14 DAYS, Disp: , Rfl:     cycloSPORINE (RESTASIS) 0.05 % ophthalmic emulsion, instill 1 drop into both eyes twice a day, Disp: , Rfl:     dicyclomine (BENTYL) 10 MG capsule, Take 1 capsule by mouth 3 (Three) Times a Day., Disp: , Rfl:     fexofenadine (ALLEGRA) 180 MG tablet, Take 1 tablet by mouth Daily., Disp: , Rfl:     gabapentin (NEURONTIN) 300 MG capsule, Take 1 capsule by mouth 3 (Three) Times a Day., Disp: 90 capsule, Rfl: 0    glucosamine sulfate 500 MG capsule capsule, Take  by mouth 3 (Three) Times a Day With Meals., Disp: , Rfl:     HumaLOG KwikPen 200 UNIT/ML solution pen-injector, INJECT 70 UNITS UNDER THE SKIN 3 (THREE) TIMES A DAY WITH MEALS., Disp: , Rfl:     Insulin Degludec (Tresiba FlexTouch) 200 UNIT/ML solution pen-injector pen injection, Inject 80 Units under the skin  into the appropriate area as directed Daily. Pt taking 100 units daily, Disp: , Rfl:     Ivermectin (Soolantra) 1 % cream, , Disp: , Rfl:     Lactase (LACTAID PO), , Disp: , Rfl:     lisinopril (PRINIVIL,ZESTRIL) 10 MG tablet, Take 1 tablet by mouth Daily., Disp: , Rfl:     loperamide (IMODIUM) 2 MG capsule, Take 1 capsule by mouth 3 (Three) Times a Day., Disp: , Rfl:     Melatonin 10 MG tablet, Take 12 mg by mouth Every Night., Disp: , Rfl:     Mounjaro 7.5 MG/0.5ML solution auto-injector, INJECT 7.5 MG UNDER THE SKIN 1 (ONE) TIME PER WEEK., Disp: , Rfl:     oxybutynin XL (DITROPAN-XL) 10 MG 24 hr tablet, Take 1 tablet by mouth Daily., Disp: , Rfl:     pantoprazole (PROTONIX) 40 MG EC tablet, Take 1 tablet by mouth Daily., Disp: , Rfl:     promethazine (PHENERGAN) 25 MG tablet, TAKE 1 TABLET BY MOUTH EVERY 4 TO 6 HOURS AS NEEDED, Disp: , Rfl:     rizatriptan (MAXALT) 10 MG tablet, Take 1 tablet by mouth 1 (One) Time As Needed for Migraine. May repeat in 2 hours if needed, Disp: , Rfl:     silver sulfadiazine (SILVADENE, SSD) 1 % cream, Apply 1 application topically to the appropriate area as directed 2 (Two) Times a Day., Disp: 50 g, Rfl: 2    Synthroid 137 MCG tablet, Take 1 tablet by mouth Daily., Disp: 90 tablet, Rfl: 3    topiramate (TOPAMAX) 50 MG tablet, Take 1 tablet by mouth Every Night., Disp: , Rfl:     triamcinolone (KENALOG) 0.1 % ointment, APPLY A THIN LAYER TO THE AFFECTED AREA BY TOPICAL ROUTE 2 TIMES PER DAY FOR 14 DAYS, Disp: , Rfl:     vilazodone (VIIBRYD) 40 MG tablet tablet, TAKE 1 TABLET BY MOUTH EVERY DAY WITH DINNER, Disp: 90 tablet, Rfl: 1    vitamin B-12 (CYANOCOBALAMIN) 1000 MCG tablet, Take 1 tablet by mouth Daily., Disp: , Rfl:     vitamin D3 125 MCG (5000 UT) capsule capsule, Take 1 capsule by mouth Daily., Disp: , Rfl:     vitamin E 400 UNIT capsule, 1 capsule., Disp: , Rfl:     Allergies   Allergen Reactions    Adhesive Tape Unknown - Low Severity, Itching and Rash    Wound Dressing  Adhesive Itching     Skin gets itchy and irritated       Family History   Problem Relation Age of Onset    Cancer Maternal Grandmother         Colon cancer;         Cancer-related family history includes Cancer in her maternal grandmother.      Social History     Tobacco Use    Smoking status: Never    Smokeless tobacco: Never   Vaping Use    Vaping status: Never Used   Substance Use Topics    Alcohol use: Yes     Alcohol/week: 2.0 standard drinks of alcohol     Types: 2 Drinks containing 0.5 oz of alcohol per week     Comment: Socially; max 2 drinks/week    Drug use: Yes     Types: Marijuana     Comment: THC gummies     Social History     Social History Narrative    Not on file       I have reviewed and confirmed the accuracy of the patient's history: Chief complaint, HPI, ROS, and Subjective as entered by the MA/LPN/RN. Rominasharron Hanks MD 24    ROS:   Review of Systems   Constitutional:  Positive for fatigue. Negative for fever.   HENT:  Negative for congestion and nosebleeds.    Eyes:  Negative for photophobia, pain and itching.   Respiratory:  Negative for cough and shortness of breath.    Cardiovascular:  Negative for chest pain.   Gastrointestinal:  Positive for abdominal pain, blood in stool and diarrhea. Negative for nausea and vomiting.   Endocrine: Negative for cold intolerance and heat intolerance.   Genitourinary:  Negative for difficulty urinating.   Musculoskeletal:  Negative for arthralgias.   Skin:  Negative for rash.   Neurological:  Positive for weakness. Negative for dizziness and headaches.   Hematological:  Does not bruise/bleed easily.   Psychiatric/Behavioral:  Negative for behavioral problems.          Objective:    Vital Signs:  There were no vitals filed for this visit.      There is no height or weight on file to calculate BMI.    ECOG  (0) Fully active, able to carry on all predisease performance without restriction    Physical Exam:   Physical  Exam  Constitutional:       Appearance: Normal appearance.   HENT:      Head: Normocephalic and atraumatic.   Eyes:      Pupils: Pupils are equal, round, and reactive to light.   Cardiovascular:      Rate and Rhythm: Normal rate and regular rhythm.      Pulses: Normal pulses.      Heart sounds: No murmur heard.  Pulmonary:      Effort: Pulmonary effort is normal.      Breath sounds: Normal breath sounds.   Abdominal:      General: There is no distension.      Palpations: Abdomen is soft. There is no mass.      Tenderness: There is no abdominal tenderness.   Musculoskeletal:         General: Normal range of motion.      Cervical back: Normal range of motion and neck supple.   Skin:     General: Skin is warm.   Neurological:      General: No focal deficit present.      Mental Status: She is alert.   Psychiatric:         Mood and Affect: Mood normal.     I have reexamined the patient and the results are consistent with the previously documented exam. Romina Hanks MD   11/18/24    Lab Results - Last 18 Months   Lab Units 11/11/24  1422 08/20/24  1209 05/02/24  1204   WBC 10*3/mm3 5.09 4.24 7.04   HEMOGLOBIN g/dL 12.3 12.6 12.7   HEMATOCRIT % 37.0 37.0 39.0   PLATELETS 10*3/mm3 192 153 193   MCV fL 87.5 87.5 90.7     Lab Results - Last 18 Months   Lab Units 11/11/24  1422 08/20/24  1209 05/02/24  1204   SODIUM mmol/L 140 141 142   POTASSIUM mmol/L 4.1 3.9 4.0   CHLORIDE mmol/L 107 107 106   CO2 mmol/L 24.2 24.6 23.0   BUN mg/dL 13 10 10   CREATININE mg/dL 0.84 0.81 0.88   CALCIUM mg/dL 9.3 9.1 9.4   BILIRUBIN mg/dL 0.3 0.5 0.3   ALK PHOS U/L 139* 137* 144*   ALT (SGPT) U/L 30 34* 38*   AST (SGOT) U/L 34* 37* 37*   GLUCOSE mg/dL 105* 175* 229*       Lab Results   Component Value Date    GLUCOSE 105 (H) 11/11/2024    BUN 13 11/11/2024    CREATININE 0.84 11/11/2024    EGFRIFAFRI >60 12/22/2020    BCR 15.5 11/11/2024    K 4.1 11/11/2024    CO2 24.2 11/11/2024    CALCIUM 9.3 11/11/2024    ALBUMIN 4.1 11/11/2024     "LABIL2 1.4 12/22/2020    AST 34 (H) 11/11/2024    ALT 30 11/11/2024       No results for input(s): \"APTT\", \"INR\", \"PTT\" in the last 86178 hours.      No results found for: \"IRON\", \"TIBC\", \"FERRITIN\"    No results found for: \"FOLATE\"    No results found for: \"OCCULTBLD\"    No results found for: \"RETICCTPCT\"  Lab Results   Component Value Date    LTFZHBTY83 562 11/05/2024     No results found for: \"SPEP\", \"UPEP\"  No results found for: \"LDH\", \"URICACID\"  No results found for: \"ERIK\", \"RF\", \"SEDRATE\"  No results found for: \"FIBRINOGEN\", \"HAPTOGLOBIN\"  Lab Results   Component Value Date    PTT 26.3 05/23/2022    INR 1.0 01/10/2023     No results found for: \"\"  Lab Results   Component Value Date    CEA 1.28 11/11/2024     No components found for: \"CA-19-9\"  No results found for: \"PSA\"  No results found for: \"SEDRATE\"       Assessment & Plan     Patient is a 49-year-old female with history of rectal adenocarcinoma status post multimodality treatment with chemotherapy resection and now on surveillance    History of rectal adenocarcinoma  We will continue surveillance with labs and exam every 3 months CT imaging every 6 months or as indicated with symptoms  She will also call her GI office with ongoing symptoms of some more blood per rectum.  She did have a recent colonoscopy in November has radiation proctitis.  Scheduled for colonoscopy December 2024 with Dr. Zepeda.  History of colonic polyps  Her next surveillance CT scans with contrast will be due February 2025.  I have ordered this today  Reviewed her recent biochemical test including CEA completed November 2024.  No significant changes    H63D mutation    No evidence of iron overload  Reviewed    Cirrhosis with splenomegaly  CBC continues to be unremarkable we will monitor  Fluctuating AST ALT  She will follow-up with GI  History of fatty liver, longstanding diabetes probable etiology for cirrhosis    She will need port maintenance    Continue monthly port " wandy    Thank you very much for providing the opportunity to participate in this patient’s care. Please do not hesitate to call if there are any other questions.    This patient is new to me      I spent 40 total minutes, face-to-face, caring for Ondrea today. 90% of this time involved counseling and/or coordination of care as documented within this note.     Electronically signed by Romina Hanks MD, 11/18/24, 3:39 PM EST.

## 2024-11-18 ENCOUNTER — OFFICE VISIT (OUTPATIENT)
Dept: ONCOLOGY | Facility: CLINIC | Age: 49
End: 2024-11-18
Payer: COMMERCIAL

## 2024-11-18 VITALS
SYSTOLIC BLOOD PRESSURE: 135 MMHG | HEART RATE: 93 BPM | DIASTOLIC BLOOD PRESSURE: 76 MMHG | TEMPERATURE: 97.9 F | HEIGHT: 69 IN | BODY MASS INDEX: 31.55 KG/M2 | WEIGHT: 213 LBS | RESPIRATION RATE: 18 BRPM | OXYGEN SATURATION: 96 %

## 2024-11-18 DIAGNOSIS — C20 RECTAL CANCER: Primary | ICD-10-CM

## 2024-11-18 PROCEDURE — 99215 OFFICE O/P EST HI 40 MIN: CPT | Performed by: INTERNAL MEDICINE

## 2024-11-22 NOTE — PROGRESS NOTES
Date: November 25, 2024  Time In: 0912  Time Out:1000      PROGRESS NOTE  Data:  Yun Mcgowan is a 49 y.o. female who presents today for individual therapy session at [facility] with NARCISO Woodruff LCSW.     Patient Chief Complaint: Follow-up for depression and anxiety     Clinical Maneuvering/Intervention: Yun is pleasant alert and oriented to person place and time.  She talked about the difficulty emotions she has processed in regards to her mother's stay in LTC and seeing her MIL's relationship with her . She describes her relationship with her MIL as unequal where her MIL has to be the person who is right, and wants continued recognition.    Assisted patient in processing above session content; acknowledged and normalized patient’s thoughts, feelings, and concerns. Rationalized patient thought process regarding the importance of working through her emotions in her situation with her mother. Setting boundaries and for her and her  to be on the same page regarding boundaries within the family. Discussed triggers associated with patient's depression. Also discussed coping skills for patient to implement such as continuing with skills already built.    Allowed patient to freely discuss issues without interruption or judgment. Provided safe, confidential environment to facilitate the development of positive therapeutic relationship and encourage open, honest communication. Assisted patient in identifying risk factors which would indicate the need for higher level of care including thoughts to harm self or others and/or self-harming behavior and encouraged patient to contact this office, call 911, or present to the nearest emergency room should any of these events occur. Discussed crisis intervention services and means to access. Patient adamantly and convincingly denies current suicidal or homicidal ideation or perceptual disturbance.    Assessment   Patient appears to  maintain relative stability as compared to their baseline. However, patient continues to struggle with pression and anxiety which continues to cause impairment in important areas of functioning. A result, they can be reasonably expected to continue to benefit from treatment and would likely be at increased risk for decompensation otherwise.    Mental Status Exam:     Hygiene:   good  Cooperation:  Cooperative  Eye Contact:  Good  Psychomotor Behavior:  Appropriate  Affect:  Appropriate  Mood: Sad, tearful    speech:  Normal  Thought Process:  Goal directed and Linear  Thought Content:  Normal  Suicidal:  None  Homicidal:  None  Hallucinations:  None  Delusion:  None  Memory:  Intact  Orientation:  Person, Place, Time and Situation  Reliability:  good  Insight:  Good  Judgement:  Good  Impulse Control:  Good  Physical/Medical Issues:  See diagnosis list     PHQ-9 Depression Screening  Little interest or pleasure in doing things? Several days   Feeling down, depressed, or hopeless? Several days   PHQ-2 Total Score 2   Trouble falling or staying asleep, or sleeping too much? Several days   Feeling tired or having little energy? Several days   Poor appetite or overeating? Not at all   Feeling bad about yourself - or that you are a failure or have let yourself or your family down? Not at all   Trouble concentrating on things, such as reading the newspaper or watching television? Not at all   Moving or speaking so slowly that other people could have noticed? Or the opposite - being so fidgety or restless that you have been moving around a lot more than usual? Not at all   Thoughts that you would be better off dead, or of hurting yourself in some way?     PHQ-9 Total Score     If you checked off any problems, how difficult have these problems made it for you to do your work, take care of things at home, or get along with other people?           MARTINE-7 Total Score:   Over the last two weeks, how often have you been bothered  by the following problems?  Feeling nervous, anxious or on edge: Several days  Not being able to stop or control worrying: Several days  Worrying too much about different things: Several days  Trouble Relaxing: Several days  Being so restless that it is hard to sit still: Not at all  Becoming easily annoyed or irritable: Not at all  Feeling afraid as if something awful might happen: Not at all  MARTINE 7 Total Score: 4     Patient's Support Network Includes:  significant other     Functional Status: Mild impairment      Progress toward goal: Not at goal     Prognosis: Good with Ongoing Treatment           Plan     Resources: Patient was provided with the following community resources:     Patient will continue in individual outpatient therapy with focus on improved functioning and coping skills, maintaining stability, and avoiding decompensation and the need for higher level of care.    Patient will adhere to any medication regimens as prescribed and report any side effects. Patient will contact this office, call 911 or present to the nearest emergency room should suicidal or homicidal ideations occur. Provide cognitive behavioral therapy and solution focused therapy to improve functioning, maintain stability and avoid decompensation and the need for higher level of care.     Return at first available appointment or earlier if symptoms worsen or fail to improve.           VISIT DIAGNOSIS:     ICD-10-CM ICD-9-CM   1. Recurrent major depressive disorder, in partial remission  F33.41 296.35   2. Generalized anxiety disorder with panic attacks  F41.1 300.02    F41.0 300.01                        This document has been electronically signed by NARCISO Woodruff, MARLENE   November 25, 2024 10:54 EST      Part of this note may be an electronic transcription/translation of spoken language to printed text using the Dragon Dictation System.

## 2024-11-25 ENCOUNTER — OFFICE VISIT (OUTPATIENT)
Dept: PSYCHIATRY | Facility: CLINIC | Age: 49
End: 2024-11-25
Payer: COMMERCIAL

## 2024-11-25 DIAGNOSIS — F41.0 GENERALIZED ANXIETY DISORDER WITH PANIC ATTACKS: ICD-10-CM

## 2024-11-25 DIAGNOSIS — F33.41 RECURRENT MAJOR DEPRESSIVE DISORDER, IN PARTIAL REMISSION: Primary | ICD-10-CM

## 2024-11-25 DIAGNOSIS — F41.1 GENERALIZED ANXIETY DISORDER WITH PANIC ATTACKS: ICD-10-CM

## 2024-12-03 ENCOUNTER — OFFICE (AMBULATORY)
Age: 49
End: 2024-12-03
Payer: COMMERCIAL

## 2024-12-03 ENCOUNTER — OFFICE (AMBULATORY)
Dept: URBAN - METROPOLITAN AREA CLINIC 64 | Facility: CLINIC | Age: 49
End: 2024-12-03
Payer: COMMERCIAL

## 2024-12-03 VITALS
WEIGHT: 210 LBS | SYSTOLIC BLOOD PRESSURE: 103 MMHG | DIASTOLIC BLOOD PRESSURE: 58 MMHG | HEIGHT: 69 IN | HEART RATE: 83 BPM | SYSTOLIC BLOOD PRESSURE: 103 MMHG | SYSTOLIC BLOOD PRESSURE: 103 MMHG | DIASTOLIC BLOOD PRESSURE: 58 MMHG | HEART RATE: 83 BPM | SYSTOLIC BLOOD PRESSURE: 103 MMHG | HEIGHT: 69 IN | WEIGHT: 210 LBS | HEART RATE: 83 BPM | DIASTOLIC BLOOD PRESSURE: 58 MMHG | HEIGHT: 69 IN | HEART RATE: 83 BPM | HEIGHT: 69 IN | HEART RATE: 83 BPM | SYSTOLIC BLOOD PRESSURE: 103 MMHG | WEIGHT: 210 LBS | SYSTOLIC BLOOD PRESSURE: 103 MMHG | WEIGHT: 210 LBS | DIASTOLIC BLOOD PRESSURE: 58 MMHG | DIASTOLIC BLOOD PRESSURE: 58 MMHG | HEART RATE: 83 BPM | WEIGHT: 210 LBS | DIASTOLIC BLOOD PRESSURE: 58 MMHG | HEART RATE: 83 BPM | DIASTOLIC BLOOD PRESSURE: 58 MMHG | WEIGHT: 210 LBS | WEIGHT: 210 LBS | HEIGHT: 69 IN | SYSTOLIC BLOOD PRESSURE: 103 MMHG | HEIGHT: 69 IN | HEIGHT: 69 IN

## 2024-12-03 DIAGNOSIS — K59.00 CONSTIPATION, UNSPECIFIED: ICD-10-CM

## 2024-12-03 DIAGNOSIS — K75.81 NONALCOHOLIC STEATOHEPATITIS (NASH): ICD-10-CM

## 2024-12-03 DIAGNOSIS — R19.7 DIARRHEA, UNSPECIFIED: ICD-10-CM

## 2024-12-03 DIAGNOSIS — Z85.048 PERSONAL HISTORY OF OTHER MALIGNANT NEOPLASM OF RECTUM, RECT: ICD-10-CM

## 2024-12-03 PROCEDURE — 99214 OFFICE O/P EST MOD 30 MIN: CPT | Performed by: INTERNAL MEDICINE

## 2024-12-03 RX ORDER — DOCUSATE SODIUM 100 MG/1
CAPSULE ORAL
Qty: 60 | Refills: 11 | Status: ACTIVE
Start: 2024-12-03

## 2024-12-09 DIAGNOSIS — C20 RECTAL CANCER: ICD-10-CM

## 2024-12-09 DIAGNOSIS — G89.3 NEOPLASM RELATED PAIN: ICD-10-CM

## 2024-12-09 RX ORDER — GABAPENTIN 300 MG/1
300 CAPSULE ORAL 3 TIMES DAILY
Qty: 90 CAPSULE | Refills: 0 | Status: SHIPPED | OUTPATIENT
Start: 2024-12-09

## 2024-12-16 ENCOUNTER — HOSPITAL ENCOUNTER (OUTPATIENT)
Dept: ONCOLOGY | Facility: HOSPITAL | Age: 49
Discharge: HOME | End: 2024-12-16
Admitting: INTERNAL MEDICINE
Payer: COMMERCIAL

## 2024-12-16 DIAGNOSIS — C20 RECTAL CANCER: Primary | ICD-10-CM

## 2024-12-16 PROCEDURE — 25010000002 HEPARIN LOCK FLUSH PER 10 UNITS: Performed by: INTERNAL MEDICINE

## 2024-12-16 PROCEDURE — 96523 IRRIG DRUG DELIVERY DEVICE: CPT

## 2024-12-16 RX ORDER — SODIUM CHLORIDE 0.9 % (FLUSH) 0.9 %
10 SYRINGE (ML) INJECTION AS NEEDED
OUTPATIENT
Start: 2024-12-16

## 2024-12-16 RX ORDER — HEPARIN SODIUM (PORCINE) LOCK FLUSH IV SOLN 100 UNIT/ML 100 UNIT/ML
500 SOLUTION INTRAVENOUS AS NEEDED
Status: DISCONTINUED | OUTPATIENT
Start: 2024-12-16 | End: 2024-12-17 | Stop reason: HOSPADM

## 2024-12-16 RX ORDER — SODIUM CHLORIDE 0.9 % (FLUSH) 0.9 %
10 SYRINGE (ML) INJECTION AS NEEDED
Status: DISCONTINUED | OUTPATIENT
Start: 2024-12-16 | End: 2024-12-17 | Stop reason: HOSPADM

## 2024-12-16 RX ORDER — HEPARIN SODIUM (PORCINE) LOCK FLUSH IV SOLN 100 UNIT/ML 100 UNIT/ML
500 SOLUTION INTRAVENOUS AS NEEDED
OUTPATIENT
Start: 2024-12-16

## 2024-12-16 RX ADMIN — Medication 10 ML: at 12:15

## 2024-12-16 RX ADMIN — HEPARIN 500 UNITS: 100 SYRINGE at 12:15

## 2024-12-16 NOTE — PROGRESS NOTES
Port accessed and flushed with good blood return noted. No labs collected. Port flushed with saline and heparin prior to needle removal. Pt declined AVS and d/c by self.

## 2024-12-17 ENCOUNTER — OFFICE (AMBULATORY)
Dept: URBAN - METROPOLITAN AREA PATHOLOGY 19 | Facility: PATHOLOGY | Age: 49
End: 2024-12-17
Payer: COMMERCIAL

## 2024-12-17 ENCOUNTER — ON CAMPUS - OUTPATIENT (AMBULATORY)
Dept: URBAN - METROPOLITAN AREA HOSPITAL 2 | Facility: HOSPITAL | Age: 49
End: 2024-12-17
Payer: COMMERCIAL

## 2024-12-17 VITALS
RESPIRATION RATE: 16 BRPM | DIASTOLIC BLOOD PRESSURE: 60 MMHG | TEMPERATURE: 96.4 F | DIASTOLIC BLOOD PRESSURE: 70 MMHG | HEART RATE: 93 BPM | HEART RATE: 82 BPM | DIASTOLIC BLOOD PRESSURE: 75 MMHG | OXYGEN SATURATION: 96 % | HEART RATE: 86 BPM | DIASTOLIC BLOOD PRESSURE: 82 MMHG | SYSTOLIC BLOOD PRESSURE: 119 MMHG | HEIGHT: 69 IN | HEART RATE: 81 BPM | HEART RATE: 87 BPM | DIASTOLIC BLOOD PRESSURE: 79 MMHG | HEART RATE: 95 BPM | SYSTOLIC BLOOD PRESSURE: 121 MMHG | OXYGEN SATURATION: 99 % | SYSTOLIC BLOOD PRESSURE: 131 MMHG | DIASTOLIC BLOOD PRESSURE: 74 MMHG | RESPIRATION RATE: 14 BRPM | SYSTOLIC BLOOD PRESSURE: 157 MMHG | WEIGHT: 208 LBS | SYSTOLIC BLOOD PRESSURE: 155 MMHG | RESPIRATION RATE: 18 BRPM | SYSTOLIC BLOOD PRESSURE: 118 MMHG | RESPIRATION RATE: 12 BRPM | SYSTOLIC BLOOD PRESSURE: 142 MMHG | RESPIRATION RATE: 17 BRPM | DIASTOLIC BLOOD PRESSURE: 58 MMHG | RESPIRATION RATE: 19 BRPM | OXYGEN SATURATION: 100 % | SYSTOLIC BLOOD PRESSURE: 149 MMHG | OXYGEN SATURATION: 95 % | DIASTOLIC BLOOD PRESSURE: 91 MMHG | OXYGEN SATURATION: 98 % | SYSTOLIC BLOOD PRESSURE: 138 MMHG | DIASTOLIC BLOOD PRESSURE: 63 MMHG

## 2024-12-17 DIAGNOSIS — K63.5 POLYP OF COLON: ICD-10-CM

## 2024-12-17 DIAGNOSIS — Z85.038 PERSONAL HISTORY OF OTHER MALIGNANT NEOPLASM OF LARGE INTEST: ICD-10-CM

## 2024-12-17 DIAGNOSIS — Z08 ENCOUNTER FOR FOLLOW-UP EXAMINATION AFTER COMPLETED TREATMEN: ICD-10-CM

## 2024-12-17 DIAGNOSIS — D12.5 BENIGN NEOPLASM OF SIGMOID COLON: ICD-10-CM

## 2024-12-17 DIAGNOSIS — K31.7 POLYP OF STOMACH AND DUODENUM: ICD-10-CM

## 2024-12-17 DIAGNOSIS — D12.3 BENIGN NEOPLASM OF TRANSVERSE COLON: ICD-10-CM

## 2024-12-17 DIAGNOSIS — K76.6 PORTAL HYPERTENSION: ICD-10-CM

## 2024-12-17 LAB
GI HISTOLOGY: A. STOMACH BODY: (no result)
GI HISTOLOGY: B. CARDIA: (no result)
GI HISTOLOGY: C. TRANSVERSE COLON: (no result)
GI HISTOLOGY: D. SIGMOID COLON: (no result)
GI HISTOLOGY: PDF REPORT: (no result)

## 2024-12-17 PROCEDURE — 88305 TISSUE EXAM BY PATHOLOGIST: CPT | Mod: 26 | Performed by: PATHOLOGY

## 2024-12-17 PROCEDURE — 43251 EGD REMOVE LESION SNARE: CPT | Performed by: INTERNAL MEDICINE

## 2024-12-17 PROCEDURE — 45385 COLONOSCOPY W/LESION REMOVAL: CPT | Mod: 33 | Performed by: INTERNAL MEDICINE

## 2024-12-18 NOTE — PROGRESS NOTES
Date: December 19, 2024  Time In: 1521  Time Out: 1613      PROGRESS NOTE  Data:  Yun Mcgowan is a 49 y.o. female who presents today for individual therapy session at [facility] with NARCISO Woodruff LCSW.     Patient Chief Complaint: Follow-up for depression and anxiety     Clinical Maneuvering/Intervention: Yun is pleasant alert and oriented to person place and time.  She spoke about the frustration that she is having during this holiday season.  She talked about the family dynamics on her side of the family and on her 's other family.  With both families there is tension and some estrangement that makes the holidays difficult.  She also verbalized frustration with losing a job due to needing to take care of her her health.    Assisted patient in processing above session content; acknowledged and normalized patient’s thoughts, feelings, and concerns. Rationalized patient thought process regarding the importance of setting boundaries with individuals who are difficult to be with.  The importance of spending time with her  during this holiday season so that she has enjoyment.  Discussed triggers associated with patient's depression. Also discussed coping skills for patient to implement such as continuing with skills already built.    Allowed patient to freely discuss issues without interruption or judgment. Provided safe, confidential environment to facilitate the development of positive therapeutic relationship and encourage open, honest communication. Assisted patient in identifying risk factors which would indicate the need for higher level of care including thoughts to harm self or others and/or self-harming behavior and encouraged patient to contact this office, call 911, or present to the nearest emergency room should any of these events occur. Discussed crisis intervention services and means to access. Patient adamantly and convincingly denies current suicidal or  homicidal ideation or perceptual disturbance.    Assessment   Patient appears to maintain relative stability as compared to their baseline. However, patient continues to struggle with pression and anxiety which continues to cause impairment in important areas of functioning. A result, they can be reasonably expected to continue to benefit from treatment and would likely be at increased risk for decompensation otherwise.    Mental Status Exam:     Hygiene:   good  Cooperation:  Cooperative  Eye Contact:  Good  Psychomotor Behavior:  Appropriate  Affect:  Appropriate  Mood: Sad, tearful    speech:  Normal  Thought Process:  Goal directed and Linear  Thought Content:  Normal  Suicidal:  None  Homicidal:  None  Hallucinations:  None  Delusion:  None  Memory:  Intact  Orientation:  Person, Place, Time and Situation  Reliability:  good  Insight:  Good  Judgement:  Good  Impulse Control:  Good  Physical/Medical Issues:  See diagnosis list     PHQ-9 Depression Screening  Little interest or pleasure in doing things? Not at all   Feeling down, depressed, or hopeless? Several days   PHQ-2 Total Score 1   Trouble falling or staying asleep, or sleeping too much? Several days   Feeling tired or having little energy? Several days   Poor appetite or overeating? Several days   Feeling bad about yourself - or that you are a failure or have let yourself or your family down? Several days   Trouble concentrating on things, such as reading the newspaper or watching television? Several days   Moving or speaking so slowly that other people could have noticed? Or the opposite - being so fidgety or restless that you have been moving around a lot more than usual? Not at all   Thoughts that you would be better off dead, or of hurting yourself in some way? Not at all   PHQ-9 Total Score 6   If you checked off any problems, how difficult have these problems made it for you to do your work, take care of things at home, or get along with other  people?           MARTINE-7 Total Score:   Over the last two weeks, how often have you been bothered by the following problems?  Feeling nervous, anxious or on edge: Several days  Not being able to stop or control worrying: Several days  Worrying too much about different things: Several days  Trouble Relaxing: Several days  Being so restless that it is hard to sit still: Not at all  Becoming easily annoyed or irritable: Not at all  Feeling afraid as if something awful might happen: Not at all  MARTINE 7 Total Score: 4     Patient's Support Network Includes:  significant other     Functional Status: Mild impairment      Progress toward goal: Not at goal     Prognosis: Good with Ongoing Treatment           Plan     Resources: Patient was provided with the following community resources:     Patient will continue in individual outpatient therapy with focus on improved functioning and coping skills, maintaining stability, and avoiding decompensation and the need for higher level of care.    Patient will adhere to any medication regimens as prescribed and report any side effects. Patient will contact this office, call 911 or present to the nearest emergency room should suicidal or homicidal ideations occur. Provide cognitive behavioral therapy and solution focused therapy to improve functioning, maintain stability and avoid decompensation and the need for higher level of care.     Return at first available appointment or earlier if symptoms worsen or fail to improve.           VISIT DIAGNOSIS:     ICD-10-CM ICD-9-CM   1. Recurrent major depressive disorder, in partial remission  F33.41 296.35   2. Generalized anxiety disorder  F41.1 300.02                          This document has been electronically signed by NARCISO Woodruff, MARLENE   December 19, 2024 16:18 EST      Part of this note may be an electronic transcription/translation of spoken language to printed text using the Dragon Dictation System.

## 2024-12-19 ENCOUNTER — OFFICE VISIT (OUTPATIENT)
Dept: PSYCHIATRY | Facility: CLINIC | Age: 49
End: 2024-12-19
Payer: COMMERCIAL

## 2024-12-19 DIAGNOSIS — F41.1 GENERALIZED ANXIETY DISORDER: ICD-10-CM

## 2024-12-19 DIAGNOSIS — F33.41 RECURRENT MAJOR DEPRESSIVE DISORDER, IN PARTIAL REMISSION: Primary | ICD-10-CM

## 2025-01-13 ENCOUNTER — HOSPITAL ENCOUNTER (OUTPATIENT)
Dept: ONCOLOGY | Facility: HOSPITAL | Age: 50
Discharge: HOME | End: 2025-01-13
Admitting: INTERNAL MEDICINE
Payer: COMMERCIAL

## 2025-01-13 ENCOUNTER — OFFICE VISIT (OUTPATIENT)
Dept: PSYCHIATRY | Facility: CLINIC | Age: 50
End: 2025-01-13
Payer: COMMERCIAL

## 2025-01-13 DIAGNOSIS — F41.1 GENERALIZED ANXIETY DISORDER: Primary | ICD-10-CM

## 2025-01-13 DIAGNOSIS — C20 RECTAL CANCER: Primary | ICD-10-CM

## 2025-01-13 DIAGNOSIS — F33.41 RECURRENT MAJOR DEPRESSIVE DISORDER, IN PARTIAL REMISSION: ICD-10-CM

## 2025-01-13 PROCEDURE — 96523 IRRIG DRUG DELIVERY DEVICE: CPT

## 2025-01-13 PROCEDURE — 25010000002 HEPARIN LOCK FLUSH PER 10 UNITS: Performed by: INTERNAL MEDICINE

## 2025-01-13 PROCEDURE — 90834 PSYTX W PT 45 MINUTES: CPT | Performed by: SOCIAL WORKER

## 2025-01-13 RX ORDER — HEPARIN SODIUM (PORCINE) LOCK FLUSH IV SOLN 100 UNIT/ML 100 UNIT/ML
500 SOLUTION INTRAVENOUS AS NEEDED
OUTPATIENT
Start: 2025-01-13

## 2025-01-13 RX ORDER — HEPARIN SODIUM (PORCINE) LOCK FLUSH IV SOLN 100 UNIT/ML 100 UNIT/ML
500 SOLUTION INTRAVENOUS AS NEEDED
Status: DISCONTINUED | OUTPATIENT
Start: 2025-01-13 | End: 2025-01-14 | Stop reason: HOSPADM

## 2025-01-13 RX ORDER — SODIUM CHLORIDE 0.9 % (FLUSH) 0.9 %
10 SYRINGE (ML) INJECTION AS NEEDED
OUTPATIENT
Start: 2025-01-13

## 2025-01-13 RX ORDER — SODIUM CHLORIDE 0.9 % (FLUSH) 0.9 %
10 SYRINGE (ML) INJECTION AS NEEDED
Status: DISCONTINUED | OUTPATIENT
Start: 2025-01-13 | End: 2025-01-14 | Stop reason: HOSPADM

## 2025-01-13 RX ADMIN — Medication 300 UNITS: at 10:19

## 2025-01-13 RX ADMIN — Medication 10 ML: at 10:18

## 2025-01-13 NOTE — PROGRESS NOTES
Date: January 13, 2025  Time In: 1106  Time Out: 1156        PROGRESS NOTE  Data:  Yun Mcgowan is a 49 y.o. female who presents today for individual therapy session at [facility] with NARCISO Woodruff LCSW.     Patient Chief Complaint: Follow-up for depression and anxiety     Clinical Maneuvering/Intervention: Yun is pleasant alert and oriented to person place and time.  She talked about the discouragement that she has with not being able to find a job.  She states that she is also caregiving for her mom which further increases anxiety that she will be able to keep a job.  She also talked about having an external locus of control regarding work ethic.  She talked about how she was able to set better boundaries with her mother-in-law.    Assisted patient in processing above session content; acknowledged and normalized patient’s thoughts, feelings, and concerns. Rationalized patient thought process regarding the importance of having an internal locus of control and what that means.  The importance of balancing what she needs in regards to working or her own health care while she caregives for her mother. Discussed triggers associated with patient's depression. Also discussed coping skills for patient to implement such as continuing with skills already built.    Allowed patient to freely discuss issues without interruption or judgment. Provided safe, confidential environment to facilitate the development of positive therapeutic relationship and encourage open, honest communication. Assisted patient in identifying risk factors which would indicate the need for higher level of care including thoughts to harm self or others and/or self-harming behavior and encouraged patient to contact this office, call 911, or present to the nearest emergency room should any of these events occur. Discussed crisis intervention services and means to access. Patient adamantly and convincingly denies current  suicidal or homicidal ideation or perceptual disturbance.    Assessment   Patient appears to maintain relative stability as compared to their baseline. However, patient continues to struggle with pression and anxiety which continues to cause impairment in important areas of functioning. A result, they can be reasonably expected to continue to benefit from treatment and would likely be at increased risk for decompensation otherwise.    Mental Status Exam:     Hygiene:   good  Cooperation:  Cooperative  Eye Contact:  Good  Psychomotor Behavior:  Appropriate  Affect:  Appropriate  Mood: Sad, tearful    speech:  Normal  Thought Process:  Goal directed and Linear  Thought Content:  Normal  Suicidal:  None  Homicidal:  None  Hallucinations:  None  Delusion:  None  Memory:  Intact  Orientation:  Person, Place, Time and Situation  Reliability:  good  Insight:  Good  Judgement:  Good  Impulse Control:  Good  Physical/Medical Issues:  See diagnosis list     PHQ-9 Depression Screening  Little interest or pleasure in doing things? Several days   Feeling down, depressed, or hopeless? Several days   PHQ-2 Total Score 2   Trouble falling or staying asleep, or sleeping too much? Several days   Feeling tired or having little energy? Several days   Poor appetite or overeating? Several days   Feeling bad about yourself - or that you are a failure or have let yourself or your family down? Not at all   Trouble concentrating on things, such as reading the newspaper or watching television? Several days   Moving or speaking so slowly that other people could have noticed? Or the opposite - being so fidgety or restless that you have been moving around a lot more than usual? Not at all   Thoughts that you would be better off dead, or of hurting yourself in some way? Not at all   PHQ-9 Total Score 6   If you checked off any problems, how difficult have these problems made it for you to do your work, take care of things at home, or get along  with other people?           MARTINE-7 Total Score:   Over the last two weeks, how often have you been bothered by the following problems?  Feeling nervous, anxious or on edge: Several days  Not being able to stop or control worrying: Several days  Worrying too much about different things: Several days  Trouble Relaxing: Several days  Being so restless that it is hard to sit still: Several days  Becoming easily annoyed or irritable: Not at all  Feeling afraid as if something awful might happen: Not at all  MARTINE 7 Total Score: 5     Patient's Support Network Includes:  significant other     Functional Status: Mild impairment      Progress toward goal: Not at goal     Prognosis: Good with Ongoing Treatment           Plan     Resources: Patient was provided with the following community resources:     Patient will continue in individual outpatient therapy with focus on improved functioning and coping skills, maintaining stability, and avoiding decompensation and the need for higher level of care.    Patient will adhere to any medication regimens as prescribed and report any side effects. Patient will contact this office, call 911 or present to the nearest emergency room should suicidal or homicidal ideations occur. Provide cognitive behavioral therapy and solution focused therapy to improve functioning, maintain stability and avoid decompensation and the need for higher level of care.     Return at first available appointment or earlier if symptoms worsen or fail to improve.           VISIT DIAGNOSIS:     ICD-10-CM ICD-9-CM   1. Generalized anxiety disorder  F41.1 300.02   2. Recurrent major depressive disorder, in partial remission  F33.41 296.35                            This document has been electronically signed by NARCIOS Woodruff, MARLENE   January 13, 2025 12:07 EST      Part of this note may be an electronic transcription/translation of spoken language to printed text using the Dragon Dictation System.

## 2025-01-27 ENCOUNTER — LAB (OUTPATIENT)
Dept: LAB | Facility: HOSPITAL | Age: 50
End: 2025-01-27
Payer: COMMERCIAL

## 2025-01-27 DIAGNOSIS — C20 RECTAL CANCER: ICD-10-CM

## 2025-01-27 LAB
ALBUMIN SERPL-MCNC: 4.1 G/DL (ref 3.5–5.2)
ALBUMIN/GLOB SERPL: 1.3 G/DL
ALP SERPL-CCNC: 142 U/L (ref 39–117)
ALT SERPL W P-5'-P-CCNC: 29 U/L (ref 1–33)
ANION GAP SERPL CALCULATED.3IONS-SCNC: 10.8 MMOL/L (ref 5–15)
AST SERPL-CCNC: 30 U/L (ref 1–32)
BASOPHILS # BLD AUTO: 0.01 10*3/MM3 (ref 0–0.2)
BASOPHILS NFR BLD AUTO: 0.2 % (ref 0–1.5)
BILIRUB SERPL-MCNC: 0.4 MG/DL (ref 0–1.2)
BUN SERPL-MCNC: 13 MG/DL (ref 6–20)
BUN/CREAT SERPL: 13 (ref 7–25)
CALCIUM SPEC-SCNC: 9.4 MG/DL (ref 8.6–10.5)
CEA SERPL-MCNC: 1.41 NG/ML
CHLORIDE SERPL-SCNC: 103 MMOL/L (ref 98–107)
CO2 SERPL-SCNC: 26.2 MMOL/L (ref 22–29)
CREAT SERPL-MCNC: 1 MG/DL (ref 0.57–1)
DEPRECATED RDW RBC AUTO: 40.5 FL (ref 37–54)
EGFRCR SERPLBLD CKD-EPI 2021: 69.2 ML/MIN/1.73
EOSINOPHIL # BLD AUTO: 0.09 10*3/MM3 (ref 0–0.4)
EOSINOPHIL NFR BLD AUTO: 2 % (ref 0.3–6.2)
ERYTHROCYTE [DISTWIDTH] IN BLOOD BY AUTOMATED COUNT: 13 % (ref 12.3–15.4)
GLOBULIN UR ELPH-MCNC: 3.2 GM/DL
GLUCOSE SERPL-MCNC: 328 MG/DL (ref 65–99)
HCT VFR BLD AUTO: 38.5 % (ref 34–46.6)
HGB BLD-MCNC: 12.7 G/DL (ref 12–15.9)
LYMPHOCYTES # BLD AUTO: 0.72 10*3/MM3 (ref 0.7–3.1)
LYMPHOCYTES NFR BLD AUTO: 15.6 % (ref 19.6–45.3)
MCH RBC QN AUTO: 28.9 PG (ref 26.6–33)
MCHC RBC AUTO-ENTMCNC: 33 G/DL (ref 31.5–35.7)
MCV RBC AUTO: 87.5 FL (ref 79–97)
MONOCYTES # BLD AUTO: 0.33 10*3/MM3 (ref 0.1–0.9)
MONOCYTES NFR BLD AUTO: 7.2 % (ref 5–12)
NEUTROPHILS NFR BLD AUTO: 3.46 10*3/MM3 (ref 1.7–7)
NEUTROPHILS NFR BLD AUTO: 75 % (ref 42.7–76)
PLATELET # BLD AUTO: 165 10*3/MM3 (ref 140–450)
PMV BLD AUTO: 11.1 FL (ref 6–12)
POTASSIUM SERPL-SCNC: 4 MMOL/L (ref 3.5–5.2)
PROT SERPL-MCNC: 7.3 G/DL (ref 6–8.5)
RBC # BLD AUTO: 4.4 10*6/MM3 (ref 3.77–5.28)
SODIUM SERPL-SCNC: 140 MMOL/L (ref 136–145)
WBC NRBC COR # BLD AUTO: 4.61 10*3/MM3 (ref 3.4–10.8)

## 2025-01-27 PROCEDURE — 36415 COLL VENOUS BLD VENIPUNCTURE: CPT

## 2025-01-27 PROCEDURE — 85025 COMPLETE CBC W/AUTO DIFF WBC: CPT

## 2025-01-27 PROCEDURE — 80053 COMPREHEN METABOLIC PANEL: CPT | Performed by: INTERNAL MEDICINE

## 2025-01-27 PROCEDURE — 82378 CARCINOEMBRYONIC ANTIGEN: CPT | Performed by: INTERNAL MEDICINE

## 2025-01-31 ENCOUNTER — HOSPITAL ENCOUNTER (OUTPATIENT)
Dept: PET IMAGING | Facility: HOSPITAL | Age: 50
Discharge: HOME | End: 2025-01-31
Admitting: INTERNAL MEDICINE
Payer: COMMERCIAL

## 2025-01-31 DIAGNOSIS — C20 RECTAL CANCER: ICD-10-CM

## 2025-01-31 PROCEDURE — 71260 CT THORAX DX C+: CPT

## 2025-01-31 PROCEDURE — 74177 CT ABD & PELVIS W/CONTRAST: CPT

## 2025-01-31 PROCEDURE — 25510000001 IOPAMIDOL PER 1 ML: Performed by: INTERNAL MEDICINE

## 2025-01-31 RX ORDER — IOPAMIDOL 755 MG/ML
100 INJECTION, SOLUTION INTRAVASCULAR
Status: COMPLETED | OUTPATIENT
Start: 2025-01-31 | End: 2025-01-31

## 2025-01-31 RX ADMIN — IOPAMIDOL 100 ML: 755 INJECTION, SOLUTION INTRAVENOUS at 12:08

## 2025-02-14 DIAGNOSIS — F41.0 GENERALIZED ANXIETY DISORDER WITH PANIC ATTACKS: Chronic | ICD-10-CM

## 2025-02-14 DIAGNOSIS — F33.1 MAJOR DEPRESSIVE DISORDER, RECURRENT EPISODE, MODERATE: Chronic | ICD-10-CM

## 2025-02-14 DIAGNOSIS — F41.1 GENERALIZED ANXIETY DISORDER WITH PANIC ATTACKS: Chronic | ICD-10-CM

## 2025-02-14 RX ORDER — VILAZODONE HYDROCHLORIDE 40 MG/1
40 TABLET ORAL
Qty: 90 TABLET | Refills: 0 | Status: SHIPPED | OUTPATIENT
Start: 2025-02-14

## 2025-02-17 ENCOUNTER — OFFICE VISIT (OUTPATIENT)
Dept: ONCOLOGY | Facility: CLINIC | Age: 50
End: 2025-02-17
Payer: COMMERCIAL

## 2025-02-17 ENCOUNTER — HOSPITAL ENCOUNTER (OUTPATIENT)
Dept: ONCOLOGY | Facility: HOSPITAL | Age: 50
Discharge: HOME | End: 2025-02-17
Admitting: INTERNAL MEDICINE
Payer: COMMERCIAL

## 2025-02-17 VITALS
BODY MASS INDEX: 31.1 KG/M2 | DIASTOLIC BLOOD PRESSURE: 69 MMHG | WEIGHT: 210 LBS | HEART RATE: 86 BPM | SYSTOLIC BLOOD PRESSURE: 129 MMHG | RESPIRATION RATE: 18 BRPM | TEMPERATURE: 98.1 F | HEIGHT: 69 IN | OXYGEN SATURATION: 96 %

## 2025-02-17 DIAGNOSIS — G89.3 NEOPLASM RELATED PAIN: ICD-10-CM

## 2025-02-17 DIAGNOSIS — C20 RECTAL CANCER: Primary | ICD-10-CM

## 2025-02-17 LAB
BASOPHILS # BLD AUTO: 0.01 10*3/MM3 (ref 0–0.2)
BASOPHILS NFR BLD AUTO: 0.2 % (ref 0–1.5)
DEPRECATED RDW RBC AUTO: 38.5 FL (ref 37–54)
EOSINOPHIL # BLD AUTO: 0.11 10*3/MM3 (ref 0–0.4)
EOSINOPHIL NFR BLD AUTO: 2.6 % (ref 0.3–6.2)
ERYTHROCYTE [DISTWIDTH] IN BLOOD BY AUTOMATED COUNT: 12.4 % (ref 12.3–15.4)
HCT VFR BLD AUTO: 37.2 % (ref 34–46.6)
HGB BLD-MCNC: 12.1 G/DL (ref 12–15.9)
LYMPHOCYTES # BLD AUTO: 0.66 10*3/MM3 (ref 0.7–3.1)
LYMPHOCYTES NFR BLD AUTO: 15.5 % (ref 19.6–45.3)
MCH RBC QN AUTO: 28.3 PG (ref 26.6–33)
MCHC RBC AUTO-ENTMCNC: 32.5 G/DL (ref 31.5–35.7)
MCV RBC AUTO: 87.1 FL (ref 79–97)
MONOCYTES # BLD AUTO: 0.31 10*3/MM3 (ref 0.1–0.9)
MONOCYTES NFR BLD AUTO: 7.3 % (ref 5–12)
NEUTROPHILS NFR BLD AUTO: 3.18 10*3/MM3 (ref 1.7–7)
NEUTROPHILS NFR BLD AUTO: 74.4 % (ref 42.7–76)
PLATELET # BLD AUTO: 164 10*3/MM3 (ref 140–450)
PMV BLD AUTO: 11.4 FL (ref 6–12)
RBC # BLD AUTO: 4.27 10*6/MM3 (ref 3.77–5.28)
WBC NRBC COR # BLD AUTO: 4.27 10*3/MM3 (ref 3.4–10.8)

## 2025-02-17 PROCEDURE — 25010000002 HEPARIN LOCK FLUSH PER 10 UNITS: Performed by: INTERNAL MEDICINE

## 2025-02-17 PROCEDURE — 36591 DRAW BLOOD OFF VENOUS DEVICE: CPT

## 2025-02-17 PROCEDURE — 85025 COMPLETE CBC W/AUTO DIFF WBC: CPT | Performed by: INTERNAL MEDICINE

## 2025-02-17 PROCEDURE — 99214 OFFICE O/P EST MOD 30 MIN: CPT | Performed by: INTERNAL MEDICINE

## 2025-02-17 RX ORDER — CLOTRIMAZOLE AND BETAMETHASONE DIPROPIONATE 10; .64 MG/G; MG/G
CREAM TOPICAL
COMMUNITY
Start: 2025-01-13

## 2025-02-17 RX ORDER — SULFAMETHOXAZOLE AND TRIMETHOPRIM 800; 160 MG/1; MG/1
1 TABLET ORAL EVERY 12 HOURS SCHEDULED
COMMUNITY
Start: 2025-01-29

## 2025-02-17 RX ORDER — HEPARIN SODIUM (PORCINE) LOCK FLUSH IV SOLN 100 UNIT/ML 100 UNIT/ML
500 SOLUTION INTRAVENOUS AS NEEDED
Status: DISCONTINUED | OUTPATIENT
Start: 2025-02-17 | End: 2025-02-18 | Stop reason: HOSPADM

## 2025-02-17 RX ORDER — DOCUSATE SODIUM 100 MG/1
2 CAPSULE, LIQUID FILLED ORAL DAILY
COMMUNITY
Start: 2024-12-03

## 2025-02-17 RX ORDER — BACLOFEN 20 MG/1
TABLET ORAL
COMMUNITY

## 2025-02-17 RX ORDER — SODIUM CHLORIDE 0.9 % (FLUSH) 0.9 %
10 SYRINGE (ML) INJECTION AS NEEDED
Status: DISCONTINUED | OUTPATIENT
Start: 2025-02-17 | End: 2025-02-18 | Stop reason: HOSPADM

## 2025-02-17 RX ORDER — SODIUM CHLORIDE 0.9 % (FLUSH) 0.9 %
10 SYRINGE (ML) INJECTION AS NEEDED
OUTPATIENT
Start: 2025-02-17

## 2025-02-17 RX ORDER — POLYETHYLENE GLYCOL 3350 17 G/17G
POWDER, FOR SOLUTION ORAL
COMMUNITY
Start: 2024-12-03

## 2025-02-17 RX ORDER — CETIRIZINE HYDROCHLORIDE 10 MG/1
1 TABLET ORAL EVERY 12 HOURS SCHEDULED
COMMUNITY
Start: 2025-01-10

## 2025-02-17 RX ORDER — HEPARIN SODIUM (PORCINE) LOCK FLUSH IV SOLN 100 UNIT/ML 100 UNIT/ML
500 SOLUTION INTRAVENOUS AS NEEDED
OUTPATIENT
Start: 2025-02-17

## 2025-02-17 RX ADMIN — Medication 10 ML: at 10:45

## 2025-02-17 RX ADMIN — HEPARIN 500 UNITS: 100 SYRINGE at 10:45

## 2025-02-17 NOTE — PROGRESS NOTES
HEMATOLOGY ONCOLOGY OUTPATIENT FOLLOW UP       Patient name: Yun Mcgowan  : 1975  MRN: [id number]  Primary Care Physician: Cherelle De La Cruz PA  Referring Physician: Cherelle De La Cruz PA  Reason For Consult: History of rectal cancer, splenomegaly with Tamayo cirrhosis, iron overload with H63D heterozygous mutation      History of Present Illness:    Patient is a 49 y.o. 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  3 started having lower abdominal pain    2022 MRI of the abdomen with hepatic cirrhosis, stable exophytic heterogenous mass in the right lobe of the liver and stable focus of abnormal signal with apparent enhancement at the right inferior margin of the left lobe of the liver.  These findings were stable as compared to previous scans.  She did have microwave ablation done in 2022 colonoscopy with Dr. Bowman showing ulcerated nonbleeding 5 cm mass of the malignant appearance in the distal rectum scope was able to traverse the lesion there were polyps in the distal sigmoid colon, ascending colon mid transverse colon.    Biopsy showed invasive moderate differentiated adenocarcinoma.  Mismatch repair proteins were all intact no loss of nuclear expression.    CT chest and pelvis with no definitive mass within the rectum 7 mm left perirectal lymph node, possibly metastatic no evidence of distant metastasis hepatic cirrhosis seen    She was seen by colorectal surgery with recommendation for total neoadjuvant treatment for stage T3b N2a lesion.    2022 patient started with cycle 1 day 1 of FOLFOX  2022 patient completed cycle 8 of neoadjuvant FOLFOX    2022 patient was initiated on 5-FU leucovorin with concurrent radiation  Patient received 25 fractions of a total dose of 4500  centigrade  Patient received radiation boost to the pelvic area with total dose of 540 cGy    1/4/2023 MRI of pelvis with and without contrast revealed T3 stable primary tumor, local mesorectal and presacral nodes with decreased size several new enhancing foci in the sacrum and left acetabulum with concern for metastatic disease  Underwent flex sigmoidoscopy with Dr. Jimenez with no mass felt treatment changes seen    1/24/2023 PET/CT with no abnormal hypermetabolic activity to confirm any viability of cancer    1/25/2023 patient underwent robotic low anterior resection with stapled coloanal anastomosis with full mobilization of splenic flexure and diverting loop ileostomy.  Pathology revealed ypT2 N0 greatest dimension 1.1 cm, histology gradient G1 invasion into muscularis propria with LVI not identified.  No loss of expression of mismatch repair proteins    Patient has since been followed with surveillance imaging    10/24/2023 CT chest abdomen pelvis with contrast revealed interval rectal resection with surgical anastomosis.  New presacral centric soft tissue technique thought to represent sequela of postsurgical and radiation therapy changes.  Previously described left pelvic cystic lesion appeared to significantly be diminished in size now with rind of soft tissue thickening centered in the left adnexal region.  Cirrhotic liver morphology    3/5/24 - CT No convincing evidence of recurrent malignancy or metastatic disease in the chest, abdomen, pelvis, or imaged skeleton.     5/2/24 - CEA 1.47,     Patient also history of right breast fibroadenoma continue mammogram    Her last colonoscopy was in November 2023 with Dr. Bowman which was unrevealing  Flex sig with Dr. Napoles with radiation proctitis    She now is established with Dr Laguna in December 2024. For colonoscopy  in december 20204      Subjective:    Patient is here today for follow-up.  She is scheduled for colonoscopy December 2024 with   Duane    Patient is here today for follow-up and denies any new issues.        Past Medical History:   Diagnosis Date    Adenocarcinoma of rectum     Adenoma of liver     Alopecia     Anxiety     Diabetes mellitus     Disease of thyroid gland     GERD (gastroesophageal reflux disease)     Graves disease     Hashimoto's thyroiditis 2020    See records Mohini Manzanares APRYADIRA    Hyperlipidemia     Hypertension     Hyperthyroidism 2005    See records Dr. Domitila Nguyen    Hypothyroidism     See records Dr. Domitila Nguyen    IBS (irritable bowel syndrome)     Type 2 diabetes mellitus 2000    See records Dr. Domitila Nguyen       Past Surgical History:   Procedure Laterality Date    CATARACT EXTRACTION Right 07/24/2023    COLONOSCOPY      ILEOSTOMY  2022    LAPAROSCOPIC OOPHORECTOMY Bilateral          Current Outpatient Medications:     cetirizine (zyrTEC) 10 MG tablet, Take 1 tablet by mouth Every 12 (Twelve) Hours., Disp: , Rfl:     clotrimazole-betamethasone (LOTRISONE) 1-0.05 % cream, apply to affected area twice a day, Disp: , Rfl:     docusate sodium (COLACE) 100 MG capsule, Take 2 capsules by mouth Daily., Disp: , Rfl:     polyethylene glycol (MIRALAX) 17 GM/SCOOP powder, TAKE AS DIRECTED PER INSTRUCTIONS FOR BOWEL PREP BY PROVIDER, Disp: , Rfl:     sulfamethoxazole-trimethoprim (BACTRIM DS,SEPTRA DS) 800-160 MG per tablet, Take 1 tablet by mouth Every 12 (Twelve) Hours., Disp: , Rfl:     acetaminophen (Tylenol 8 Hour) 650 MG 8 hr tablet, Take 2 tablets every 8 hours by oral route., Disp: , Rfl:     B-D ULTRAFINE III SHORT PEN 31G X 8 MM misc, , Disp: , Rfl:     baclofen (LIORESAL) 20 MG tablet, 0, Disp: , Rfl:     buPROPion XL (WELLBUTRIN XL) 150 MG 24 hr tablet, TAKE 1 TABLET BY MOUTH EVERY DAY IN THE MORNING, Disp: 90 tablet, Rfl: 1    Continuous Blood Gluc Sensor (FreeStyle Fang 3 Sensor) misc, CHANGE SENSOR EVERY 14 DAYS, Disp: , Rfl:     cycloSPORINE (RESTASIS) 0.05 % ophthalmic emulsion, instill 1 drop into both eyes  twice a day, Disp: , Rfl:     diclofenac (VOLTAREN) 50 MG EC tablet, 0, Disp: , Rfl:     dicyclomine (BENTYL) 10 MG capsule, Take 1 capsule by mouth 3 (Three) Times a Day., Disp: , Rfl:     fexofenadine (ALLEGRA) 180 MG tablet, Take 1 tablet by mouth Daily., Disp: , Rfl:     gabapentin (NEURONTIN) 300 MG capsule, Take 1 capsule by mouth 3 (Three) Times a Day., Disp: 90 capsule, Rfl: 0    glucosamine sulfate 500 MG capsule capsule, Take  by mouth 3 (Three) Times a Day With Meals., Disp: , Rfl:     HumaLOG KwikPen 200 UNIT/ML solution pen-injector, INJECT 70 UNITS UNDER THE SKIN 3 (THREE) TIMES A DAY WITH MEALS., Disp: , Rfl:     Insulin Degludec (Tresiba FlexTouch) 200 UNIT/ML solution pen-injector pen injection, Inject 80 Units under the skin into the appropriate area as directed Daily. Pt taking 100 units daily, Disp: , Rfl:     Ivermectin (Soolantra) 1 % cream, , Disp: , Rfl:     Lactase (LACTAID PO), , Disp: , Rfl:     lisinopril (PRINIVIL,ZESTRIL) 10 MG tablet, Take 1 tablet by mouth Daily., Disp: , Rfl:     loperamide (IMODIUM) 2 MG capsule, Take 1 capsule by mouth 3 (Three) Times a Day., Disp: , Rfl:     Melatonin 10 MG tablet, Take 12 mg by mouth Every Night., Disp: , Rfl:     Mounjaro 7.5 MG/0.5ML solution auto-injector, INJECT 7.5 MG UNDER THE SKIN 1 (ONE) TIME PER WEEK., Disp: , Rfl:     oxybutynin XL (DITROPAN-XL) 10 MG 24 hr tablet, Take 1 tablet by mouth Daily., Disp: , Rfl:     pantoprazole (PROTONIX) 40 MG EC tablet, Take 1 tablet by mouth Daily., Disp: , Rfl:     promethazine (PHENERGAN) 25 MG tablet, TAKE 1 TABLET BY MOUTH EVERY 4 TO 6 HOURS AS NEEDED, Disp: , Rfl:     rizatriptan (MAXALT) 10 MG tablet, Take 1 tablet by mouth 1 (One) Time As Needed for Migraine. May repeat in 2 hours if needed, Disp: , Rfl:     silver sulfadiazine (SILVADENE, SSD) 1 % cream, Apply 1 application topically to the appropriate area as directed 2 (Two) Times a Day., Disp: 50 g, Rfl: 2    Synthroid 137 MCG tablet, Take 1  tablet by mouth Daily., Disp: 90 tablet, Rfl: 3    topiramate (TOPAMAX) 50 MG tablet, Take 1 tablet by mouth Every Night., Disp: , Rfl:     triamcinolone (KENALOG) 0.1 % ointment, APPLY A THIN LAYER TO THE AFFECTED AREA BY TOPICAL ROUTE 2 TIMES PER DAY FOR 14 DAYS, Disp: , Rfl:     vilazodone (VIIBRYD) 40 MG tablet tablet, TAKE 1 TABLET BY MOUTH EVERY DAY WITH DINNER, Disp: 90 tablet, Rfl: 0    vitamin B-12 (CYANOCOBALAMIN) 1000 MCG tablet, Take 1 tablet by mouth Daily., Disp: , Rfl:     vitamin D3 125 MCG (5000 UT) capsule capsule, Take 1 capsule by mouth Daily., Disp: , Rfl:     vitamin E 400 UNIT capsule, 1 capsule., Disp: , Rfl:   No current facility-administered medications for this visit.    Facility-Administered Medications Ordered in Other Visits:     heparin injection 500 Units, 500 Units, Intravenous, PRN, Romina Hanks MD, 500 Units at 25 1045    sodium chloride 0.9 % flush 10 mL, 10 mL, Intravenous, PRN, Romina Hanks MD, 10 mL at 25 1045    Allergies   Allergen Reactions    Adhesive Tape Unknown - Low Severity, Itching and Rash    Wound Dressing Adhesive Itching     Skin gets itchy and irritated       Family History   Problem Relation Age of Onset    Cancer Maternal Grandmother         Colon cancer;         Cancer-related family history includes Cancer in her maternal grandmother.      Social History     Tobacco Use    Smoking status: Never    Smokeless tobacco: Never   Vaping Use    Vaping status: Never Used   Substance Use Topics    Alcohol use: Yes     Alcohol/week: 2.0 standard drinks of alcohol     Types: 2 Drinks containing 0.5 oz of alcohol per week     Comment: Socially; max 2 drinks/week    Drug use: Yes     Types: Marijuana     Comment: THC gummies     Social History     Social History Narrative    Not on file         ROS:   Review of Systems   Constitutional:  Positive for fatigue. Negative for fever.   HENT:  Negative for congestion and nosebleeds.  "   Eyes:  Negative for photophobia, pain and itching.   Respiratory:  Negative for cough and shortness of breath.    Cardiovascular:  Negative for chest pain.   Gastrointestinal:  Positive for abdominal pain, blood in stool and diarrhea. Negative for nausea and vomiting.   Endocrine: Negative for cold intolerance and heat intolerance.   Genitourinary:  Negative for difficulty urinating.   Musculoskeletal:  Negative for arthralgias.   Skin:  Negative for rash.   Neurological:  Positive for weakness. Negative for dizziness and headaches.   Hematological:  Does not bruise/bleed easily.   Psychiatric/Behavioral:  Negative for behavioral problems.          Objective:    Vital Signs:  Vitals:    02/17/25 1110   BP: 129/69   Pulse: 86   Resp: 18   Temp: 98.1 °F (36.7 °C)   TempSrc: Infrared   SpO2: 96%   Weight: 95.3 kg (210 lb)   Height: 175.3 cm (69\")   PainSc: 0-No pain         Body mass index is 31.01 kg/m².    ECOG  (0) Fully active, able to carry on all predisease performance without restriction    Physical Exam:   Physical Exam  Constitutional:       Appearance: Normal appearance.   HENT:      Head: Normocephalic and atraumatic.   Eyes:      Pupils: Pupils are equal, round, and reactive to light.   Cardiovascular:      Rate and Rhythm: Normal rate and regular rhythm.      Pulses: Normal pulses.      Heart sounds: No murmur heard.  Pulmonary:      Effort: Pulmonary effort is normal.      Breath sounds: Normal breath sounds.   Abdominal:      General: There is no distension.      Palpations: Abdomen is soft. There is no mass.      Tenderness: There is no abdominal tenderness.   Musculoskeletal:         General: Normal range of motion.      Cervical back: Normal range of motion and neck supple.   Skin:     General: Skin is warm.   Neurological:      General: No focal deficit present.      Mental Status: She is alert.   Psychiatric:         Mood and Affect: Mood normal.         I have reexamined the patient and the " "results are consistent with the previously documented exam. Romina Hanks MD        Lab Results - Last 18 Months   Lab Units 02/17/25  1045 01/27/25  1000 11/11/24  1422   WBC 10*3/mm3 4.27 4.61 5.09   HEMOGLOBIN g/dL 12.1 12.7 12.3   HEMATOCRIT % 37.2 38.5 37.0   PLATELETS 10*3/mm3 164 165 192   MCV fL 87.1 87.5 87.5     Lab Results - Last 18 Months   Lab Units 01/27/25  1000 11/11/24  1422 08/20/24  1209   SODIUM mmol/L 140 140 141   POTASSIUM mmol/L 4.0 4.1 3.9   CHLORIDE mmol/L 103 107 107   CO2 mmol/L 26.2 24.2 24.6   BUN mg/dL 13 13 10   CREATININE mg/dL 1.00 0.84 0.81   CALCIUM mg/dL 9.4 9.3 9.1   BILIRUBIN mg/dL 0.4 0.3 0.5   ALK PHOS U/L 142* 139* 137*   ALT (SGPT) U/L 29 30 34*   AST (SGOT) U/L 30 34* 37*   GLUCOSE mg/dL 328* 105* 175*       Lab Results   Component Value Date    GLUCOSE 328 (H) 01/27/2025    BUN 13 01/27/2025    CREATININE 1.00 01/27/2025    EGFRIFAFRI >60 12/22/2020    BCR 13.0 01/27/2025    K 4.0 01/27/2025    CO2 26.2 01/27/2025    CALCIUM 9.4 01/27/2025    ALBUMIN 4.1 01/27/2025    LABIL2 1.4 12/22/2020    AST 30 01/27/2025    ALT 29 01/27/2025       No results for input(s): \"APTT\", \"INR\", \"PTT\" in the last 94110 hours.      No results found for: \"IRON\", \"TIBC\", \"FERRITIN\"    No results found for: \"FOLATE\"    No results found for: \"OCCULTBLD\"    No results found for: \"RETICCTPCT\"  Lab Results   Component Value Date    GXYBRZJX38 562 11/05/2024     No results found for: \"SPEP\", \"UPEP\"  No results found for: \"LDH\", \"URICACID\"  No results found for: \"ERIK\", \"RF\", \"SEDRATE\"  No results found for: \"FIBRINOGEN\", \"HAPTOGLOBIN\"  Lab Results   Component Value Date    PTT 26.3 05/23/2022    INR 1.0 01/10/2023     No results found for: \"\"  Lab Results   Component Value Date    CEA 1.41 01/27/2025     No components found for: \"CA-19-9\"  No results found for: \"PSA\"  No results found for: \"SEDRATE\"       Assessment & Plan     Patient is a 49-year-old female with history of rectal " adenocarcinoma status post multimodality treatment with chemotherapy resection and now on surveillance    History of rectal adenocarcinoma  We will continue surveillance with labs and exam every 3 months CT imaging every 6 months or as indicated with symptoms  She will also call her GI office with ongoing symptoms of some more blood per rectum.  She did have a recent colonoscopy in November has radiation proctitis.  Scheduled for colonoscopy December 2024 with Dr. Zepeda.  History of colonic polyps    Reviewed her recent biochemical test including CEA completed November 2024.  No significant changes  Surveillance CT scans do not show any evidence of progression completed in January 2025    H63D mutation    No evidence of iron overload  Reviewed    Cirrhosis with splenomegaly  CBC continues to be unremarkable we will monitor  Fluctuating AST ALT  She will follow-up with GI  History of fatty liver, longstanding diabetes probable etiology for cirrhosis  Reviewed    Continue monthly port maintenance    Follow-up in 4 months CBC, CMP CEA level done a week before  Continue every 6 months CT scans    Thank you very much for providing the opportunity to participate in this patient’s care. Please do not hesitate to call if there are any other questions.    This patient is new to me    Electronically signed by Romina Hanks MD, 02/17/25, 5:26 PM EST.

## 2025-02-17 NOTE — PROGRESS NOTES
Port accessed and flushed with good blood return noted. 10cc of blood wasted prior to specimen collection. Blood specimen obtained and sent to lab for processing per protocol.  Port flushed with saline and heparin prior to needle removal. Pt sent to waiting room for MD [place].

## 2025-03-17 ENCOUNTER — HOSPITAL ENCOUNTER (OUTPATIENT)
Dept: ONCOLOGY | Facility: HOSPITAL | Age: 50
Discharge: HOME | End: 2025-03-17
Admitting: INTERNAL MEDICINE
Payer: COMMERCIAL

## 2025-03-17 DIAGNOSIS — C20 RECTAL CANCER: Primary | ICD-10-CM

## 2025-03-17 PROCEDURE — 25010000002 HEPARIN LOCK FLUSH PER 10 UNITS: Performed by: INTERNAL MEDICINE

## 2025-03-17 PROCEDURE — 96523 IRRIG DRUG DELIVERY DEVICE: CPT

## 2025-03-17 RX ORDER — HEPARIN SODIUM (PORCINE) LOCK FLUSH IV SOLN 100 UNIT/ML 100 UNIT/ML
500 SOLUTION INTRAVENOUS AS NEEDED
Status: DISCONTINUED | OUTPATIENT
Start: 2025-03-17 | End: 2025-03-18 | Stop reason: HOSPADM

## 2025-03-17 RX ORDER — SODIUM CHLORIDE 0.9 % (FLUSH) 0.9 %
10 SYRINGE (ML) INJECTION AS NEEDED
Status: DISCONTINUED | OUTPATIENT
Start: 2025-03-17 | End: 2025-03-18 | Stop reason: HOSPADM

## 2025-03-17 RX ORDER — HEPARIN SODIUM (PORCINE) LOCK FLUSH IV SOLN 100 UNIT/ML 100 UNIT/ML
500 SOLUTION INTRAVENOUS AS NEEDED
OUTPATIENT
Start: 2025-03-17

## 2025-03-17 RX ORDER — SODIUM CHLORIDE 0.9 % (FLUSH) 0.9 %
10 SYRINGE (ML) INJECTION AS NEEDED
OUTPATIENT
Start: 2025-03-17

## 2025-03-17 RX ADMIN — Medication 10 ML: at 11:11

## 2025-03-17 RX ADMIN — HEPARIN 500 UNITS: 100 SYRINGE at 11:12

## 2025-03-17 NOTE — PROGRESS NOTES
Port accessed and flushed with good blood return noted. No labs collected. Port flushed with saline and heparin prior to needle removal. Pt is aware of her next appt and given AVS. Pt d/c by self.

## 2025-04-14 ENCOUNTER — HOSPITAL ENCOUNTER (OUTPATIENT)
Dept: ONCOLOGY | Facility: HOSPITAL | Age: 50
Discharge: HOME | End: 2025-04-14
Payer: COMMERCIAL

## 2025-04-14 DIAGNOSIS — C20 RECTAL CANCER: Primary | ICD-10-CM

## 2025-04-14 PROCEDURE — 96523 IRRIG DRUG DELIVERY DEVICE: CPT

## 2025-04-14 PROCEDURE — 25010000002 HEPARIN LOCK FLUSH PER 10 UNITS: Performed by: INTERNAL MEDICINE

## 2025-04-14 RX ORDER — HEPARIN SODIUM (PORCINE) LOCK FLUSH IV SOLN 100 UNIT/ML 100 UNIT/ML
500 SOLUTION INTRAVENOUS AS NEEDED
OUTPATIENT
Start: 2025-04-14

## 2025-04-14 RX ORDER — HEPARIN SODIUM (PORCINE) LOCK FLUSH IV SOLN 100 UNIT/ML 100 UNIT/ML
500 SOLUTION INTRAVENOUS AS NEEDED
Status: DISCONTINUED | OUTPATIENT
Start: 2025-04-14 | End: 2025-04-15 | Stop reason: HOSPADM

## 2025-04-14 RX ORDER — SODIUM CHLORIDE 0.9 % (FLUSH) 0.9 %
10 SYRINGE (ML) INJECTION AS NEEDED
Status: DISCONTINUED | OUTPATIENT
Start: 2025-04-14 | End: 2025-04-15 | Stop reason: HOSPADM

## 2025-04-14 RX ORDER — SODIUM CHLORIDE 0.9 % (FLUSH) 0.9 %
10 SYRINGE (ML) INJECTION AS NEEDED
OUTPATIENT
Start: 2025-04-14

## 2025-04-14 RX ADMIN — HEPARIN 500 UNITS: 100 SYRINGE at 11:13

## 2025-04-14 RX ADMIN — Medication 10 ML: at 11:13

## 2025-04-14 NOTE — PROGRESS NOTES
Port accessed and flushed with good blood return noted. No labs collected. Port flushed with saline and heparin prior to needle removal. Patient has next apts.

## 2025-04-24 ENCOUNTER — OFFICE VISIT (OUTPATIENT)
Dept: PSYCHIATRY | Facility: CLINIC | Age: 50
End: 2025-04-24
Payer: COMMERCIAL

## 2025-04-24 DIAGNOSIS — F41.1 GENERALIZED ANXIETY DISORDER: Primary | ICD-10-CM
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
## 2025-04-26 DIAGNOSIS — C20 RECTAL CANCER: ICD-10-CM

## 2025-04-26 DIAGNOSIS — G89.3 NEOPLASM RELATED PAIN: ICD-10-CM

## 2025-05-02 DIAGNOSIS — G89.3 NEOPLASM RELATED PAIN: ICD-10-CM

## 2025-05-02 DIAGNOSIS — C20 RECTAL CANCER: ICD-10-CM

## 2025-05-02 RX ORDER — GABAPENTIN 300 MG/1
300 CAPSULE ORAL 3 TIMES DAILY
Qty: 90 CAPSULE | Refills: 0 | Status: SHIPPED | OUTPATIENT
Start: 2025-05-02

## 2025-05-02 NOTE — TELEPHONE ENCOUNTER
Caller: Yun Mcgowan SOPHIA    Relationship: Self    Best call back number: [phone]    Requested Prescriptions:   Requested Prescriptions     Pending Prescriptions Disp Refills    gabapentin (NEURONTIN) 300 MG capsule 90 capsule 0     Sig: Take 1 capsule by mouth 3 (Three) Times a Day.        Pharmacy where request should be sent:  [facility] [place]     Last office visit with prescribing clinician: 2/17/2025   Last telemedicine visit with prescribing clinician: Visit date not found   Next office visit with prescribing clinician: 6/16/2025     Additional details provided by patient: PATIENT IS OUT OF MEDICATION     Does the patient have less than a 3 day supply:  [x] Yes  [] No    Would you like a call back once the refill request has been completed: [] Yes [] No    If the office needs to give you a call back, can they leave a voicemail: [] Yes [] No    Mitali Amado Rep   05/02/25 12:55 EDT

## 2025-05-09 LAB
T4 FREE SERPL-MCNC: 1.44 NG/DL (ref 0.82–1.77)
TSH SERPL DL<=0.005 MIU/L-ACNC: 0.35 UIU/ML (ref 0.45–4.5)

## 2025-05-10 DIAGNOSIS — F33.1 MAJOR DEPRESSIVE DISORDER, RECURRENT EPISODE, MODERATE: Chronic | ICD-10-CM

## 2025-05-10 DIAGNOSIS — F41.1 GENERALIZED ANXIETY DISORDER WITH PANIC ATTACKS: Chronic | ICD-10-CM

## 2025-05-10 DIAGNOSIS — F41.0 GENERALIZED ANXIETY DISORDER WITH PANIC ATTACKS: Chronic | ICD-10-CM

## 2025-05-12 ENCOUNTER — HOSPITAL ENCOUNTER (OUTPATIENT)
Dept: ONCOLOGY | Facility: HOSPITAL | Age: 50
Discharge: HOME | End: 2025-05-12
Admitting: INTERNAL MEDICINE
Payer: COMMERCIAL

## 2025-05-12 DIAGNOSIS — C20 RECTAL CANCER: Primary | ICD-10-CM

## 2025-05-12 PROCEDURE — 96523 IRRIG DRUG DELIVERY DEVICE: CPT

## 2025-05-12 PROCEDURE — 25010000002 HEPARIN LOCK FLUSH PER 10 UNITS: Performed by: INTERNAL MEDICINE

## 2025-05-12 RX ORDER — SODIUM CHLORIDE 0.9 % (FLUSH) 0.9 %
10 SYRINGE (ML) INJECTION AS NEEDED
OUTPATIENT
Start: 2025-05-12

## 2025-05-12 RX ORDER — BUPROPION HYDROCHLORIDE 150 MG/1
150 TABLET ORAL EVERY MORNING
Qty: 90 TABLET | Refills: 0 | Status: SHIPPED | OUTPATIENT
Start: 2025-05-12

## 2025-05-12 RX ORDER — SODIUM CHLORIDE 0.9 % (FLUSH) 0.9 %
10 SYRINGE (ML) INJECTION AS NEEDED
Status: DISCONTINUED | OUTPATIENT
Start: 2025-05-12 | End: 2025-05-13 | Stop reason: HOSPADM

## 2025-05-12 RX ORDER — HEPARIN SODIUM (PORCINE) LOCK FLUSH IV SOLN 100 UNIT/ML 100 UNIT/ML
500 SOLUTION INTRAVENOUS AS NEEDED
OUTPATIENT
Start: 2025-05-12

## 2025-05-12 RX ORDER — HEPARIN SODIUM (PORCINE) LOCK FLUSH IV SOLN 100 UNIT/ML 100 UNIT/ML
500 SOLUTION INTRAVENOUS AS NEEDED
Status: DISCONTINUED | OUTPATIENT
Start: 2025-05-12 | End: 2025-05-13 | Stop reason: HOSPADM

## 2025-05-12 RX ADMIN — Medication 500 UNITS: at 13:51

## 2025-05-12 RX ADMIN — Medication 10 ML: at 13:51

## 2025-05-12 NOTE — TELEPHONE ENCOUNTER
Rx Refill Note  Requested Prescriptions     Pending Prescriptions Disp Refills    buPROPion XL (WELLBUTRIN XL) 150 MG 24 hr tablet [Pharmacy Med Name: BUPROPION HCL  MG TABLET] 90 tablet 1     Sig: TAKE 1 TABLET BY MOUTH EVERY DAY IN THE MORNING        Last office visit with prescribing clinician: 11/13/2024     Next office visit with prescribing clinician: 6/9/2025     Office Visit with Tarah Tello APRN (11/13/2024)     Ping Qaun  05/12/25, 09:40 EDT

## 2025-05-12 NOTE — PROGRESS NOTES
Port accessed and flushed with good blood return noted. No labs collected. Port flushed with saline and heparin prior to needle removal. Pt discharged and aware of next appts

## 2025-05-15 DIAGNOSIS — F33.1 MAJOR DEPRESSIVE DISORDER, RECURRENT EPISODE, MODERATE: Chronic | ICD-10-CM

## 2025-05-15 DIAGNOSIS — F41.0 GENERALIZED ANXIETY DISORDER WITH PANIC ATTACKS: Chronic | ICD-10-CM

## 2025-05-15 DIAGNOSIS — F41.1 GENERALIZED ANXIETY DISORDER WITH PANIC ATTACKS: Chronic | ICD-10-CM

## 2025-05-15 RX ORDER — VILAZODONE HYDROCHLORIDE 40 MG/1
40 TABLET ORAL
Qty: 90 TABLET | Refills: 0 | Status: SHIPPED | OUTPATIENT
Start: 2025-05-15

## 2025-05-15 NOTE — TELEPHONE ENCOUNTER
Rx Refill Note  Requested Prescriptions     Pending Prescriptions Disp Refills    vilazodone (VIIBRYD) 40 MG tablet tablet [Pharmacy Med Name: VILAZODONE HCL 40 MG TABLET] 90 tablet 0     Sig: TAKE 1 TABLET BY MOUTH EVERY DAY WITH DINNER        Last office visit with prescribing clinician: Visit date not found     Next office visit with prescribing clinician: Visit date not found         Inspect Fill     Ping Quan  05/15/25, 09:21 EDT

## 2025-05-22 ENCOUNTER — OFFICE VISIT (OUTPATIENT)
Dept: ENDOCRINOLOGY | Facility: CLINIC | Age: 50
End: 2025-05-22
Payer: COMMERCIAL

## 2025-05-22 VITALS
WEIGHT: 200 LBS | HEART RATE: 97 BPM | SYSTOLIC BLOOD PRESSURE: 130 MMHG | OXYGEN SATURATION: 97 % | HEIGHT: 69 IN | BODY MASS INDEX: 29.62 KG/M2 | DIASTOLIC BLOOD PRESSURE: 80 MMHG

## 2025-05-22 DIAGNOSIS — E11.65 TYPE 2 DIABETES MELLITUS WITH HYPERGLYCEMIA, WITHOUT LONG-TERM CURRENT USE OF INSULIN: ICD-10-CM

## 2025-05-22 DIAGNOSIS — E89.0 POSTABLATIVE HYPOTHYROIDISM: Primary | ICD-10-CM

## 2025-05-22 DIAGNOSIS — I10 PRIMARY HYPERTENSION: ICD-10-CM

## 2025-05-22 DIAGNOSIS — E78.5 HYPERLIPIDEMIA, UNSPECIFIED HYPERLIPIDEMIA TYPE: ICD-10-CM

## 2025-05-22 PROCEDURE — 99214 OFFICE O/P EST MOD 30 MIN: CPT | Performed by: INTERNAL MEDICINE

## 2025-05-22 NOTE — PROGRESS NOTES
-----------------------------------------------------------------  ENDOCRINE CLINIC NOTE  -----------------------------------------------------------------        PATIENT NAME: Yun Mcgowan  PATIENT : 1975 AGE: 50 y.o.  MRN NUMBER: [id number]  PRIMARY CARE: Cherelle De La Cruz PA    ==========================================================================    CHIEF COMPLAINT: Hypothyroidism  DATE OF SERVICE: 25    ==========================================================================    HPI / SUBJECTIVE    50 y.o. 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  Is currently taking levothyroxine brand-name 137 mcg p.o. daily.  Previously was taking [place] Thyroid.  Tolerating medication and reports to have better response to brand-name levothyroxine than generic.  Recently had blood work done which showed TSH is mildly on the lower side whereas the free T4 is within normal limits.  Patient also had blood work done in 2025 which showed TSH was normal.  Patient also have type 2 diabetes and currently following endocrinology at [facility].    ==========================================================================                                                PAST MEDICAL HISTORY    Past Medical History:   Diagnosis Date    Adenocarcinoma of rectum     Adenoma of liver     Alopecia     Anxiety     Diabetes mellitus     Disease of thyroid gland     GERD (gastroesophageal reflux disease)     Graves disease     Hashimoto's thyroiditis     See records Mohini ANNE    Hyperlipidemia     Hypertension     Hyperthyroidism     See records Dr. Domitila Nguyen    Hypothyroidism     See records Dr. Domitila Nguyen    IBS (irritable bowel syndrome)     Type 2 diabetes mellitus     See records Dr. Domitila Nguyen       ==========================================================================    PAST SURGICAL HISTORY    Past  Surgical History:   Procedure Laterality Date    CATARACT EXTRACTION Right 2023    COLONOSCOPY      ILEOSTOMY      LAPAROSCOPIC OOPHORECTOMY Bilateral        ==========================================================================    FAMILY HISTORY    Family History   Problem Relation Age of Onset    Cancer Maternal Grandmother         Colon cancer;         ==========================================================================    SOCIAL HISTORY    Social History     Socioeconomic History    Marital status:      Spouse name: Ryan    Number of children: 0    Highest education level: Bachelor's degree (e.g., BA, AB, BS)   Tobacco Use    Smoking status: Never    Smokeless tobacco: Never   Vaping Use    Vaping status: Never Used   Substance and Sexual Activity    Alcohol use: Yes     Alcohol/week: 2.0 standard drinks of alcohol     Types: 2 Drinks containing 0.5 oz of alcohol per week     Comment: Socially; max 2 drinks/week    Drug use: Yes     Types: Marijuana     Comment: THC gummies    Sexual activity: Yes     Partners: Male     Comment: No contraception--menopausal now and infertility issues       ==========================================================================    MEDICATIONS      Current Outpatient Medications:     acetaminophen (Tylenol 8 Hour) 650 MG 8 hr tablet, Take 2 tablets every 8 hours by oral route., Disp: , Rfl:     B-D ULTRAFINE III SHORT PEN 31G X 8 MM misc, , Disp: , Rfl:     buPROPion XL (WELLBUTRIN XL) 150 MG 24 hr tablet, TAKE 1 TABLET BY MOUTH EVERY DAY IN THE MORNING, Disp: 90 tablet, Rfl: 0    cetirizine (zyrTEC) 10 MG tablet, Take 1 tablet by mouth Every 12 (Twelve) Hours., Disp: , Rfl:     clotrimazole-betamethasone (LOTRISONE) 1-0.05 % cream, apply to affected area twice a day, Disp: , Rfl:     Continuous Blood Gluc Sensor (FreeStyle Fang 3 Sensor) misc, CHANGE SENSOR EVERY 14 DAYS, Disp: , Rfl:     cycloSPORINE (RESTASIS) 0.05 % ophthalmic  emulsion, instill 1 drop into both eyes twice a day, Disp: , Rfl:     diclofenac (VOLTAREN) 50 MG EC tablet, 0, Disp: , Rfl:     dicyclomine (BENTYL) 10 MG capsule, Take 1 capsule by mouth 3 (Three) Times a Day., Disp: , Rfl:     fexofenadine (ALLEGRA) 180 MG tablet, Take 1 tablet by mouth Daily., Disp: , Rfl:     gabapentin (NEURONTIN) 300 MG capsule, Take 1 capsule by mouth 3 (Three) Times a Day., Disp: 90 capsule, Rfl: 0    glucosamine sulfate 500 MG capsule capsule, Take  by mouth 3 (Three) Times a Day With Meals., Disp: , Rfl:     HumaLOG KwikPen 200 UNIT/ML solution pen-injector, INJECT 70 UNITS UNDER THE SKIN 3 (THREE) TIMES A DAY WITH MEALS., Disp: , Rfl:     Insulin Degludec (Tresiba FlexTouch) 200 UNIT/ML solution pen-injector pen injection, Inject 80 Units under the skin into the appropriate area as directed Daily. Pt taking 100 units daily, Disp: , Rfl:     Ivermectin (Soolantra) 1 % cream, , Disp: , Rfl:     Lactase (LACTAID PO), , Disp: , Rfl:     lisinopril (PRINIVIL,ZESTRIL) 10 MG tablet, Take 1 tablet by mouth Daily., Disp: , Rfl:     loperamide (IMODIUM) 2 MG capsule, Take 1 capsule by mouth 3 (Three) Times a Day., Disp: , Rfl:     Melatonin 10 MG tablet, Take 12 mg by mouth Every Night., Disp: , Rfl:     Mounjaro 7.5 MG/0.5ML solution auto-injector, INJECT 7.5 MG UNDER THE SKIN 1 (ONE) TIME PER WEEK., Disp: , Rfl:     oxybutynin XL (DITROPAN-XL) 10 MG 24 hr tablet, Take 1 tablet by mouth Daily., Disp: , Rfl:     pantoprazole (PROTONIX) 40 MG EC tablet, Take 1 tablet by mouth Daily., Disp: , Rfl:     promethazine (PHENERGAN) 25 MG tablet, TAKE 1 TABLET BY MOUTH EVERY 4 TO 6 HOURS AS NEEDED, Disp: , Rfl:     rizatriptan (MAXALT) 10 MG tablet, Take 1 tablet by mouth 1 (One) Time As Needed for Migraine. May repeat in 2 hours if needed, Disp: , Rfl:     silver sulfadiazine (SILVADENE, SSD) 1 % cream, Apply 1 application topically to the appropriate area as directed 2 (Two) Times a Day., Disp: 50 g,  Rfl: 2    sulfamethoxazole-trimethoprim (BACTRIM DS,SEPTRA DS) 800-160 MG per tablet, Take 1 tablet by mouth Every 12 (Twelve) Hours., Disp: , Rfl:     Synthroid 137 MCG tablet, Take 1 tablet by mouth Daily., Disp: 90 tablet, Rfl: 3    topiramate (TOPAMAX) 50 MG tablet, Take 1 tablet by mouth Every Night., Disp: , Rfl:     triamcinolone (KENALOG) 0.1 % ointment, APPLY A THIN LAYER TO THE AFFECTED AREA BY TOPICAL ROUTE 2 TIMES PER DAY FOR 14 DAYS, Disp: , Rfl:     vilazodone (VIIBRYD) 40 MG tablet tablet, TAKE 1 TABLET BY MOUTH EVERY DAY WITH DINNER, Disp: 90 tablet, Rfl: 0    vitamin B-12 (CYANOCOBALAMIN) 1000 MCG tablet, Take 1 tablet by mouth Daily., Disp: , Rfl:     vitamin D3 125 MCG (5000 UT) capsule capsule, Take 1 capsule by mouth Daily., Disp: , Rfl:     vitamin E 400 UNIT capsule, 1 capsule., Disp: , Rfl:     ==========================================================================    ALLERGIES    Allergies   Allergen Reactions    Adhesive Tape Unknown - Low Severity, Itching and Rash    Wound Dressing Adhesive Itching     Skin gets itchy and irritated       ==========================================================================    OBJECTIVE    Vitals:    05/22/25 1229   BP: 130/80   Pulse: 97   SpO2: 97%     Body mass index is 29.53 kg/m².     General: Alert, cooperative, no acute distress  Thyroid:  no enlargement/tenderness/palpable nodules    ==========================================================================    LAB EVALUATION    Lab Results   Component Value Date    GLUCOSE 328 (H) 01/27/2025    BUN 13 01/27/2025    CREATININE 1.00 01/27/2025    EGFRIFAFRI >60 12/22/2020    BCR 13.0 01/27/2025    K 4.0 01/27/2025    CO2 26.2 01/27/2025    CALCIUM 9.4 01/27/2025    ALBUMIN 4.1 01/27/2025    LABIL2 1.4 12/22/2020    AST 30 01/27/2025    ALT 29 01/27/2025    TRIG 119 02/03/2025    HDL 57 02/03/2025    LDL 66 02/03/2025     Lab Results   Component Value Date    HGBA1C 9.2 (H) 11/07/2020     Lab  "Results   Component Value Date    CREATININE 1.00 01/27/2025     Lab Results   Component Value Date    TSH 0.349 (L) 05/08/2025    FREET4 1.44 05/08/2025     ==========================================================================    ASSESSMENT AND PLAN    # Hypothyroidism, post ablative  - Continue Synthyroid 137 mcg p.o. daily  -Discussed with patient about possibly de-escalating dose to 125 versus continuing Synthyroid 137 mcg p.o. daily and repeat blood work in 3 months and in 6 months time and adjust therapy if needed  - Rationale to continue therapies because patient had TSH drawn in February 2025 which was within normal limits  - If patient have low TSH and subsequent blood work we will plan adjustment of therapy to Synthroid 125 mcg p.o. daily  - Clinical visit repeat in 6 months time    # Type 2 Diabetes Mellitus  # Obesity  - Patient is currently following different physician for type 2 diabetes management (Dr. Domitila Nguyen)  - Patient wants to switch to my care for type 2 diabetes management once the previous physician retires    # Allergic rhinitis, counseled patient to use nasal saline spray 3 times a day and as needed    #HLD  #HTN  - Care as per primary team    Return to clinic: 6 months    Entire assessment and plan was discussed and counseled the patient in detail to which patient verbalized understanding and agreed with care.  Answered all queries and concerns.    This note was created using voice recognition software and is inherently subject to errors including those of syntax and \"sound-alike\" substitutions which may escape proofreading.  In such instances, original meaning may be extrapolated by contextual derivation.    Note: Portions of this note may have been copied from previous notes but documentation have been reviewed and edited as necessary to support clinical decision making for today's visit.    ==========================================================================    INFORMATION " PROVIDED TO PATIENT    Patient Instructions   Please,    - Continue brand-name thyroid / levothyroxine tablets 137 mcg by mouth daily at 6:00 AM  - Take it every day, on an empty stomach, 30 minutes before eating  - Avoid taking it with iron, calcium, other medications (blocks absorption), wait at least 4 hrs after taking levothyroxine to take these medications  - If you miss a pill, you can take 2 the next day and return to schedule from next day     - Repeat thyroid work-up lab in 3 months and in 6 months.    Follow up in 6 months time.    Thank you for your visit today.    If you have any questions or concerns please feel free to reach out of the office.       ==========================================================================  Eitan Aranda MD  Department of Endocrine, Diabetes and Metabolism  [place], [place]  ==========================================================================

## 2025-05-22 NOTE — PATIENT INSTRUCTIONS
Please,    - Continue brand-name thyroid / levothyroxine tablets 137 mcg by mouth daily at 6:00 AM  - Take it every day, on an empty stomach, 30 minutes before eating  - Avoid taking it with iron, calcium, other medications (blocks absorption), wait at least 4 hrs after taking levothyroxine to take these medications  - If you miss a pill, you can take 2 the next day and return to schedule from next day     - Repeat thyroid work-up lab in 3 months and in 6 months.    Follow up in 6 months time.    Thank you for your visit today.    If you have any questions or concerns please feel free to reach out of the office.

## 2025-05-28 NOTE — PROGRESS NOTES
"Date: May 29, 2025  Time In: 1523  Time Out: 1613        PROGRESS NOTE  Data:  Yun Mcgowan is a 50 y.o. female who presents today for individual therapy session at [facility] with NARCISO Woodruff, MARLENE.     Patient Chief Complaint: Follow-up for depression and anxiety     Clinical Maneuvering/Intervention: Yun is pleasant alert and oriented to person place and time.  She spoke about how she feels discouraged right now.  She does not feel like she is doing anything well in the area of her life because she feels that she is \"spread thin\".  She talked about being told she does not do well at work however her work does not have any firm processes.  She and Ryan have some communication issues related to how tone triggers him.  She talked about family dynamics that have been hurtful.    Assisted patient in processing above session content; acknowledged and normalized patient’s thoughts, feelings, and concerns. Rationalized patient thought process regarding the importance of doing something for herself every day that is either relaxing, enjoyable or for her own emotional self-care.  Discussed triggers associated with patient's depression. Also discussed coping skills for patient to implement such as continuing with skills already built.    Allowed patient to freely discuss issues without interruption or judgment. Provided safe, confidential environment to facilitate the development of positive therapeutic relationship and encourage open, honest communication. Assisted patient in identifying risk factors which would indicate the need for higher level of care including thoughts to harm self or others and/or self-harming behavior and encouraged patient to contact this office, call 911, or present to the nearest emergency room should any of these events occur. Discussed crisis intervention services and means to access. Patient adamantly and convincingly denies current suicidal or homicidal " ideation or perceptual disturbance.    Assessment   Patient appears to maintain relative stability as compared to their baseline. However, patient continues to struggle with pression and anxiety which continues to cause impairment in important areas of functioning. A result, they can be reasonably expected to continue to benefit from treatment and would likely be at increased risk for decompensation otherwise.    Mental Status Exam:     Hygiene:   good  Cooperation:  Cooperative  Eye Contact:  Good  Psychomotor Behavior:  Appropriate  Affect:  Appropriate  Mood: Sad, tearful    speech:  Normal  Thought Process:  Goal directed and Linear  Thought Content:  Normal  Suicidal:  None  Homicidal:  None  Hallucinations:  None  Delusion:  None  Memory:  Intact  Orientation:  Person, Place, Time and Situation  Reliability:  good  Insight:  Good  Judgement:  Good  Impulse Control:  Good  Physical/Medical Issues:  See diagnosis list     PHQ-9 Depression Screening  Little interest or pleasure in doing things? Not at all   Feeling down, depressed, or hopeless? Several days   PHQ-2 Total Score 1   Trouble falling or staying asleep, or sleeping too much? Several days   Feeling tired or having little energy? Several days   Poor appetite or overeating? Several days   Feeling bad about yourself - or that you are a failure or have let yourself or your family down? Not at all   Trouble concentrating on things, such as reading the newspaper or watching television? Several days   Moving or speaking so slowly that other people could have noticed? Or the opposite - being so fidgety or restless that you have been moving around a lot more than usual? Not at all   Thoughts that you would be better off dead, or of hurting yourself in some way? Not at all   PHQ-9 Total Score 5   If you checked off any problems, how difficult have these problems made it for you to do your work, take care of things at home, or get along with other people?            MARTINE-7 Total Score:   Over the last two weeks, how often have you been bothered by the following problems?  Feeling nervous, anxious or on edge: Several days  Not being able to stop or control worrying: Several days  Worrying too much about different things: Several days  Trouble Relaxing: Several days  Being so restless that it is hard to sit still: Not at all  Becoming easily annoyed or irritable: Not at all  Feeling afraid as if something awful might happen: Not at all  MARTINE 7 Total Score: 4     Patient's Support Network Includes:  significant other     Functional Status: Mild impairment      Progress toward goal: Not at goal     Prognosis: Good with Ongoing Treatment           Plan     Resources: Patient was provided with the following community resources: None at this time     Patient will continue in individual outpatient therapy with focus on improved functioning and coping skills, maintaining stability, and avoiding decompensation and the need for higher level of care.    Patient will adhere to any medication regimens as prescribed and report any side effects. Patient will contact this office, call 911 or present to the nearest emergency room should suicidal or homicidal ideations occur. Provide cognitive behavioral therapy and solution focused therapy to improve functioning, maintain stability and avoid decompensation and the need for higher level of care.     Return at first available appointment or earlier if symptoms worsen or fail to improve.           VISIT DIAGNOSIS:     ICD-10-CM ICD-9-CM   1. Major depressive disorder, recurrent episode, moderate  F33.1 296.32   2. Generalized anxiety disorder  F41.1 300.02                                This document has been electronically signed by NARCISO Woodruff, MARLENE   May 29, 2025 16:36 EDT      Part of this note may be an electronic transcription/translation of spoken language to printed text using the Dragon Dictation System.

## 2025-05-29 ENCOUNTER — OFFICE VISIT (OUTPATIENT)
Dept: PSYCHIATRY | Facility: CLINIC | Age: 50
End: 2025-05-29
Payer: COMMERCIAL

## 2025-05-29 DIAGNOSIS — F41.1 GENERALIZED ANXIETY DISORDER: ICD-10-CM

## 2025-05-29 DIAGNOSIS — F33.1 MAJOR DEPRESSIVE DISORDER, RECURRENT EPISODE, MODERATE: Primary | ICD-10-CM

## 2025-06-09 ENCOUNTER — HOSPITAL ENCOUNTER (OUTPATIENT)
Dept: ONCOLOGY | Facility: HOSPITAL | Age: 50
Discharge: HOME | End: 2025-06-09
Admitting: INTERNAL MEDICINE
Payer: COMMERCIAL

## 2025-06-09 DIAGNOSIS — C20 RECTAL CANCER: Primary | ICD-10-CM

## 2025-06-09 LAB
ALBUMIN SERPL-MCNC: 4.3 G/DL (ref 3.5–5.2)
ALBUMIN/GLOB SERPL: 1.5 G/DL
ALP SERPL-CCNC: 120 U/L (ref 39–117)
ALT SERPL W P-5'-P-CCNC: 18 U/L (ref 1–33)
ANION GAP SERPL CALCULATED.3IONS-SCNC: 10.8 MMOL/L (ref 5–15)
AST SERPL-CCNC: 23 U/L (ref 1–32)
BASOPHILS # BLD AUTO: 0.02 10*3/MM3 (ref 0–0.2)
BASOPHILS NFR BLD AUTO: 0.5 % (ref 0–1.5)
BILIRUB SERPL-MCNC: 0.4 MG/DL (ref 0–1.2)
BUN SERPL-MCNC: 13.2 MG/DL (ref 6–20)
BUN/CREAT SERPL: 15.3 (ref 7–25)
CALCIUM SPEC-SCNC: 9.1 MG/DL (ref 8.6–10.5)
CEA SERPL-MCNC: 1.2 NG/ML
CHLORIDE SERPL-SCNC: 107 MMOL/L (ref 98–107)
CO2 SERPL-SCNC: 23.2 MMOL/L (ref 22–29)
CREAT SERPL-MCNC: 0.86 MG/DL (ref 0.57–1)
DEPRECATED RDW RBC AUTO: 38.7 FL (ref 37–54)
EGFRCR SERPLBLD CKD-EPI 2021: 82.4 ML/MIN/1.73
EOSINOPHIL # BLD AUTO: 0.1 10*3/MM3 (ref 0–0.4)
EOSINOPHIL NFR BLD AUTO: 2.5 % (ref 0.3–6.2)
ERYTHROCYTE [DISTWIDTH] IN BLOOD BY AUTOMATED COUNT: 12.3 % (ref 12.3–15.4)
GLOBULIN UR ELPH-MCNC: 2.8 GM/DL
GLUCOSE SERPL-MCNC: 159 MG/DL (ref 65–99)
HCT VFR BLD AUTO: 37.5 % (ref 34–46.6)
HGB BLD-MCNC: 12.4 G/DL (ref 12–15.9)
LYMPHOCYTES # BLD AUTO: 0.52 10*3/MM3 (ref 0.7–3.1)
LYMPHOCYTES NFR BLD AUTO: 12.9 % (ref 19.6–45.3)
MCH RBC QN AUTO: 29.2 PG (ref 26.6–33)
MCHC RBC AUTO-ENTMCNC: 33.1 G/DL (ref 31.5–35.7)
MCV RBC AUTO: 88.4 FL (ref 79–97)
MONOCYTES # BLD AUTO: 0.3 10*3/MM3 (ref 0.1–0.9)
MONOCYTES NFR BLD AUTO: 7.5 % (ref 5–12)
NEUTROPHILS NFR BLD AUTO: 3.08 10*3/MM3 (ref 1.7–7)
NEUTROPHILS NFR BLD AUTO: 76.6 % (ref 42.7–76)
PLATELET # BLD AUTO: 153 10*3/MM3 (ref 140–450)
PMV BLD AUTO: 11.2 FL (ref 6–12)
POTASSIUM SERPL-SCNC: 3.9 MMOL/L (ref 3.5–5.2)
PROT SERPL-MCNC: 7.1 G/DL (ref 6–8.5)
RBC # BLD AUTO: 4.24 10*6/MM3 (ref 3.77–5.28)
SODIUM SERPL-SCNC: 141 MMOL/L (ref 136–145)
WBC NRBC COR # BLD AUTO: 4.02 10*3/MM3 (ref 3.4–10.8)

## 2025-06-09 PROCEDURE — 36591 DRAW BLOOD OFF VENOUS DEVICE: CPT

## 2025-06-09 PROCEDURE — 25010000002 HEPARIN LOCK FLUSH PER 10 UNITS: Performed by: INTERNAL MEDICINE

## 2025-06-09 PROCEDURE — 85025 COMPLETE CBC W/AUTO DIFF WBC: CPT | Performed by: INTERNAL MEDICINE

## 2025-06-09 PROCEDURE — 80053 COMPREHEN METABOLIC PANEL: CPT | Performed by: INTERNAL MEDICINE

## 2025-06-09 PROCEDURE — 82378 CARCINOEMBRYONIC ANTIGEN: CPT | Performed by: INTERNAL MEDICINE

## 2025-06-09 RX ORDER — SODIUM CHLORIDE 0.9 % (FLUSH) 0.9 %
10 SYRINGE (ML) INJECTION AS NEEDED
Status: DISCONTINUED | OUTPATIENT
Start: 2025-06-09 | End: 2025-06-10 | Stop reason: HOSPADM

## 2025-06-09 RX ORDER — HEPARIN SODIUM (PORCINE) LOCK FLUSH IV SOLN 100 UNIT/ML 100 UNIT/ML
500 SOLUTION INTRAVENOUS AS NEEDED
Status: DISCONTINUED | OUTPATIENT
Start: 2025-06-09 | End: 2025-06-10 | Stop reason: HOSPADM

## 2025-06-09 RX ORDER — HEPARIN SODIUM (PORCINE) LOCK FLUSH IV SOLN 100 UNIT/ML 100 UNIT/ML
500 SOLUTION INTRAVENOUS AS NEEDED
OUTPATIENT
Start: 2025-06-09

## 2025-06-09 RX ORDER — SODIUM CHLORIDE 0.9 % (FLUSH) 0.9 %
10 SYRINGE (ML) INJECTION AS NEEDED
OUTPATIENT
Start: 2025-06-09

## 2025-06-09 RX ADMIN — HEPARIN 500 UNITS: 100 SYRINGE at 11:18

## 2025-06-09 RX ADMIN — Medication 10 ML: at 11:18

## 2025-06-09 NOTE — PROGRESS NOTES
Pt here for labs/ PF.  CBC, CMP and CEA drawn as ordered.  Port accessed and flushed with good blood return noted. 10cc of blood wasted prior to specimen collection. Blood specimen obtained and sent to lab for processing per protocol.  Port flushed with saline and heparin prior to needle removal. AVS provided and pt discharged

## 2025-06-16 NOTE — PROGRESS NOTES
HEMATOLOGY ONCOLOGY OUTPATIENT FOLLOW UP       Patient name: Yun Mcgowan  : 1975  MRN: [id number]  Primary Care Physician: Cherelle De La Cruz PA  Referring Physician: Cherelle De La Cruz PA  Reason For Consult: History of rectal cancer, splenomegaly with Tamayo cirrhosis, iron overload with H63D heterozygous mutation      History of Present Illness:    Patient is a 50 y.o. 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  3 started having lower abdominal pain    2022 MRI of the abdomen with hepatic cirrhosis, stable exophytic heterogenous mass in the right lobe of the liver and stable focus of abnormal signal with apparent enhancement at the right inferior margin of the left lobe of the liver.  These findings were stable as compared to previous scans.  She did have microwave ablation done in 2022 colonoscopy with Dr. Bowman showing ulcerated nonbleeding 5 cm mass of the malignant appearance in the distal rectum scope was able to traverse the lesion there were polyps in the distal sigmoid colon, ascending colon mid transverse colon.    Biopsy showed invasive moderate differentiated adenocarcinoma.  Mismatch repair proteins were all intact no loss of nuclear expression.    CT chest and pelvis with no definitive mass within the rectum 7 mm left perirectal lymph node, possibly metastatic no evidence of distant metastasis hepatic cirrhosis seen    She was seen by colorectal surgery with recommendation for total neoadjuvant treatment for stage T3b N2a lesion.    2022 patient started with cycle 1 day 1 of FOLFOX  2022 patient completed cycle 8 of neoadjuvant FOLFOX    2022 patient was initiated on 5-FU leucovorin with concurrent radiation  Patient received 25 fractions of a total dose of 4500  centigrade  Patient received radiation boost to the pelvic area with total dose of 540 cGy    1/4/2023 MRI of pelvis with and without contrast revealed T3 stable primary tumor, local mesorectal and presacral nodes with decreased size several new enhancing foci in the sacrum and left acetabulum with concern for metastatic disease  Underwent flex sigmoidoscopy with Dr. Jimenez with no mass felt treatment changes seen    1/24/2023 PET/CT with no abnormal hypermetabolic activity to confirm any viability of cancer    1/25/2023 patient underwent robotic low anterior resection with stapled coloanal anastomosis with full mobilization of splenic flexure and diverting loop ileostomy.  Pathology revealed ypT2 N0 greatest dimension 1.1 cm, histology gradient G1 invasion into muscularis propria with LVI not identified.  No loss of expression of mismatch repair proteins    Patient has since been followed with surveillance imaging    10/24/2023 CT chest abdomen pelvis with contrast revealed interval rectal resection with surgical anastomosis.  New presacral centric soft tissue technique thought to represent sequela of postsurgical and radiation therapy changes.  Previously described left pelvic cystic lesion appeared to significantly be diminished in size now with rind of soft tissue thickening centered in the left adnexal region.  Cirrhotic liver morphology    3/5/24 - CT No convincing evidence of recurrent malignancy or metastatic disease in the chest, abdomen, pelvis, or imaged skeleton.     5/2/24 - CEA 1.47,     Patient also history of right breast fibroadenoma continue mammogram    9/16/24 Abnormal mammogram, abnormal left side mammogram patient reports  two different biopsies, but they both came back okay. She is due for mammogram in September. She said she sees Dr. Cat Amezcua at [facility] on [email or web address]     Her last colonoscopy was in November 2023 with Dr. Bowman which was unrevealing  Flex sig with Dr. Napoles  with radiation proctitis    She now is established with Dr Laguna in December 2024. For colonoscopy  in december 2024 6/17/25 records requested of colonoscopy report     6/17/25 HPPI above reviewed and updated   Subjective:    Patient is here today for follow-up.  She is scheduled for colonoscopy December 2024 with Dr. Zepeda    Patient is here today for follow-up and denies any new issues.    6/17/25 Yun is here for follow up. She reports some weight loss since she started a job in February since she has diarrhea and does not eat much before and during work for fear of needing to use the restroom too long. She report she has seen GI who ordered her Bentyl which only help somewhat. She reports diarrhea since XRT therapy. She denies any hematemesis or melena.She denies any other complaints     Past Medical History:   Diagnosis Date    Adenocarcinoma of rectum     Adenoma of liver     Alopecia     Anxiety     Diabetes mellitus     Disease of thyroid gland     GERD (gastroesophageal reflux disease)     Graves disease     Hashimoto's thyroiditis 2020    See records Mohini ANNE    Hyperlipidemia     Hypertension     Hyperthyroidism 2005    See records Dr. Domitila Nguyen    Hypothyroidism     See records Dr. Domitila Nguyen    IBS (irritable bowel syndrome)     Type 2 diabetes mellitus 2000    See records Dr. Domitila Nguyen       Past Surgical History:   Procedure Laterality Date    CATARACT EXTRACTION Right 07/24/2023    COLONOSCOPY      ILEOSTOMY  2022    LAPAROSCOPIC OOPHORECTOMY Bilateral          Current Outpatient Medications:     acetaminophen (Tylenol 8 Hour) 650 MG 8 hr tablet, Take 2 tablets every 8 hours by oral route., Disp: , Rfl:     B-D ULTRAFINE III SHORT PEN 31G X 8 MM misc, , Disp: , Rfl:     buPROPion XL (WELLBUTRIN XL) 150 MG 24 hr tablet, TAKE 1 TABLET BY MOUTH EVERY DAY IN THE MORNING, Disp: 90 tablet, Rfl: 0    cetirizine (zyrTEC) 10 MG tablet, Take 1 tablet by mouth Every 12 (Twelve) Hours.,  Disp: , Rfl:     clotrimazole-betamethasone (LOTRISONE) 1-0.05 % cream, apply to affected area twice a day, Disp: , Rfl:     Continuous Blood Gluc Sensor (FreeStyle Fang 3 Sensor) misc, CHANGE SENSOR EVERY 14 DAYS, Disp: , Rfl:     cycloSPORINE (RESTASIS) 0.05 % ophthalmic emulsion, instill 1 drop into both eyes twice a day, Disp: , Rfl:     diclofenac (VOLTAREN) 50 MG EC tablet, 0, Disp: , Rfl:     dicyclomine (BENTYL) 10 MG capsule, Take 1 capsule by mouth 3 (Three) Times a Day., Disp: , Rfl:     fexofenadine (ALLEGRA) 180 MG tablet, Take 1 tablet by mouth Daily., Disp: , Rfl:     gabapentin (NEURONTIN) 300 MG capsule, Take 1 capsule by mouth 3 (Three) Times a Day., Disp: 90 capsule, Rfl: 0    glucosamine sulfate 500 MG capsule capsule, Take  by mouth 3 (Three) Times a Day With Meals., Disp: , Rfl:     HumaLOG KwikPen 200 UNIT/ML solution pen-injector, INJECT 70 UNITS UNDER THE SKIN 3 (THREE) TIMES A DAY WITH MEALS., Disp: , Rfl:     Insulin Degludec (Tresiba FlexTouch) 200 UNIT/ML solution pen-injector pen injection, Inject 80 Units under the skin into the appropriate area as directed Daily. Pt taking 100 units daily, Disp: , Rfl:     Ivermectin (Soolantra) 1 % cream, , Disp: , Rfl:     Lactase (LACTAID PO), , Disp: , Rfl:     lisinopril (PRINIVIL,ZESTRIL) 10 MG tablet, Take 1 tablet by mouth Daily., Disp: , Rfl:     loperamide (IMODIUM) 2 MG capsule, Take 1 capsule by mouth 3 (Three) Times a Day., Disp: , Rfl:     Melatonin 10 MG tablet, Take 12 mg by mouth Every Night., Disp: , Rfl:     Mounjaro 7.5 MG/0.5ML solution auto-injector, INJECT 7.5 MG UNDER THE SKIN 1 (ONE) TIME PER WEEK., Disp: , Rfl:     oxybutynin XL (DITROPAN-XL) 10 MG 24 hr tablet, Take 1 tablet by mouth Daily., Disp: , Rfl:     pantoprazole (PROTONIX) 40 MG EC tablet, Take 1 tablet by mouth Daily., Disp: , Rfl:     promethazine (PHENERGAN) 25 MG tablet, TAKE 1 TABLET BY MOUTH EVERY 4 TO 6 HOURS AS NEEDED, Disp: , Rfl:     rizatriptan (MAXALT)  10 MG tablet, Take 1 tablet by mouth 1 (One) Time As Needed for Migraine. May repeat in 2 hours if needed, Disp: , Rfl:     silver sulfadiazine (SILVADENE, SSD) 1 % cream, Apply 1 application topically to the appropriate area as directed 2 (Two) Times a Day., Disp: 50 g, Rfl: 2    sulfamethoxazole-trimethoprim (BACTRIM DS,SEPTRA DS) 800-160 MG per tablet, Take 1 tablet by mouth Every 12 (Twelve) Hours., Disp: , Rfl:     Synthroid 137 MCG tablet, Take 1 tablet by mouth Daily., Disp: 90 tablet, Rfl: 3    topiramate (TOPAMAX) 50 MG tablet, Take 1 tablet by mouth Every Night., Disp: , Rfl:     triamcinolone (KENALOG) 0.1 % ointment, APPLY A THIN LAYER TO THE AFFECTED AREA BY TOPICAL ROUTE 2 TIMES PER DAY FOR 14 DAYS, Disp: , Rfl:     vilazodone (VIIBRYD) 40 MG tablet tablet, TAKE 1 TABLET BY MOUTH EVERY DAY WITH DINNER, Disp: 90 tablet, Rfl: 0    vitamin B-12 (CYANOCOBALAMIN) 1000 MCG tablet, Take 1 tablet by mouth Daily., Disp: , Rfl:     vitamin D3 125 MCG (5000 UT) capsule capsule, Take 1 capsule by mouth Daily., Disp: , Rfl:     vitamin E 400 UNIT capsule, 1 capsule., Disp: , Rfl:     Allergies   Allergen Reactions    Adhesive Tape Unknown - Low Severity, Itching and Rash    Wound Dressing Adhesive Itching     Skin gets itchy and irritated       Family History   Problem Relation Age of Onset    Cancer Maternal Grandmother         Colon cancer;         Cancer-related family history includes Cancer in her maternal grandmother.      Social History     Tobacco Use    Smoking status: Never    Smokeless tobacco: Never   Vaping Use    Vaping status: Never Used   Substance Use Topics    Alcohol use: Yes     Alcohol/week: 2.0 standard drinks of alcohol     Types: 2 Drinks containing 0.5 oz of alcohol per week     Comment: Socially; max 2 drinks/week    Drug use: Yes     Types: Marijuana     Comment: THC gummies     Social History     Social History Narrative    Not on file         ROS:   Review of Systems  "  Constitutional:  Negative for fatigue and fever.   HENT:  Negative for congestion and nosebleeds.    Eyes:  Negative for photophobia, pain and itching.   Respiratory:  Negative for cough and shortness of breath.    Cardiovascular:  Negative for chest pain.   Gastrointestinal:  Positive for diarrhea. Negative for abdominal pain, blood in stool, nausea and vomiting.   Endocrine: Negative for cold intolerance and heat intolerance.   Genitourinary:  Negative for difficulty urinating.   Musculoskeletal:  Negative for arthralgias.   Skin:  Negative for rash.   Neurological:  Negative for dizziness, weakness and headaches.   Hematological:  Does not bruise/bleed easily.   Psychiatric/Behavioral:  Negative for behavioral problems.          Objective:    Vital Signs:  Vitals:    06/17/25 0918   BP: 125/81   Pulse: 88   Temp: 97.3 °F (36.3 °C)   SpO2: 99%   Weight: 90.7 kg (200 lb)   Height: 175.3 cm (69.02\")   PainSc: 0-No pain           Body mass index is 29.52 kg/m².    ECOG  (0) Fully active, able to carry on all predisease performance without restriction    Physical Exam:   Physical Exam  Constitutional:       Appearance: Normal appearance.   HENT:      Head: Normocephalic and atraumatic.      Right Ear: External ear normal.      Left Ear: External ear normal.      Nose: Nose normal.      Mouth/Throat:      Mouth: Mucous membranes are moist.   Eyes:      General: No scleral icterus.        Right eye: No discharge.         Left eye: No discharge.      Conjunctiva/sclera: Conjunctivae normal.   Cardiovascular:      Rate and Rhythm: Normal rate and regular rhythm.      Pulses: Normal pulses.      Heart sounds: Normal heart sounds. No murmur heard.  Pulmonary:      Effort: Pulmonary effort is normal.      Breath sounds: Normal breath sounds.   Abdominal:      Palpations: Abdomen is soft.   Musculoskeletal:         General: Normal range of motion.      Cervical back: Normal range of motion and neck supple.   Skin:     " "General: Skin is warm.   Neurological:      General: No focal deficit present.      Mental Status: She is alert and oriented to person, place, and time.   Psychiatric:         Mood and Affect: Mood normal.         Behavior: Behavior normal.         Thought Content: Thought content normal.         Judgment: Judgment normal.           I have reexamined the patient and the results are consistent with the previously documented exam.         Lab Results - Last 18 Months   Lab Units 06/09/25  1108 02/17/25  1045 01/27/25  1000   WBC 10*3/mm3 4.02 4.27 4.61   HEMOGLOBIN g/dL 12.4 12.1 12.7   HEMATOCRIT % 37.5 37.2 38.5   PLATELETS 10*3/mm3 153 164 165   MCV fL 88.4 87.1 87.5     Lab Results - Last 18 Months   Lab Units 06/09/25  1108 01/27/25  1000 11/11/24  1422   SODIUM mmol/L 141 140 140   POTASSIUM mmol/L 3.9 4.0 4.1   CHLORIDE mmol/L 107 103 107   CO2 mmol/L 23.2 26.2 24.2   BUN mg/dL 13.2 13 13   CREATININE mg/dL 0.86 1.00 0.84   CALCIUM mg/dL 9.1 9.4 9.3   BILIRUBIN mg/dL 0.4 0.4 0.3   ALK PHOS U/L 120* 142* 139*   ALT (SGPT) U/L 18 29 30   AST (SGOT) U/L 23 30 34*   GLUCOSE mg/dL 159* 328* 105*       Lab Results   Component Value Date    GLUCOSE 159 (H) 06/09/2025    BUN 13.2 06/09/2025    CREATININE 0.86 06/09/2025    EGFRIFAFRI >60 12/22/2020    BCR 15.3 06/09/2025    K 3.9 06/09/2025    CO2 23.2 06/09/2025    CALCIUM 9.1 06/09/2025    ALBUMIN 4.3 06/09/2025    LABIL2 1.4 12/22/2020    AST 23 06/09/2025    ALT 18 06/09/2025       No results for input(s): \"APTT\", \"INR\", \"PTT\" in the last 34184 hours.      No results found for: \"IRON\", \"TIBC\", \"FERRITIN\"    No results found for: \"FOLATE\"    No results found for: \"OCCULTBLD\"    No results found for: \"RETICCTPCT\"  Lab Results   Component Value Date    GLYCNYXC52 562 11/05/2024     No results found for: \"SPEP\", \"UPEP\"  No results found for: \"LDH\", \"URICACID\"  No results found for: \"ERIK\", \"RF\", \"SEDRATE\"  No results found for: \"FIBRINOGEN\", \"HAPTOGLOBIN\"  Lab Results " "  Component Value Date    PTT 26.3 05/23/2022    INR 1.0 01/10/2023     No results found for: \"\"  Lab Results   Component Value Date    CEA 1.20 06/09/2025     No components found for: \"CA-19-9\"  No results found for: \"PSA\"  No results found for: \"SEDRATE\"       Assessment & Plan     Patient is a 49-year-old female with history of rectal adenocarcinoma status post multimodality treatment with chemotherapy resection and now on surveillance    History of rectal adenocarcinoma  We will continue surveillance with labs and exam every 3 months CT imaging every 6 months or as indicated with symptoms. 6/17/25 CEA CMP  cbc review.ed CT chest abdomen and pelvis ordered this visit for  6 month surveillance . Repeat cbc cmp CEA 3 months   She will also call her GI office with ongoing symptoms of some more blood per rectum.  She did have a recent colonoscopy in November has radiation proctitis.  Scheduled for colonoscopy December 2024 with Dr. Zepeda.  History of colonic polyps 6/17/25 records of report requested     Reviewed her recent biochemical test including CEA completed 6/6/25 1.20  No significant changes  Surveillance CT scans do not show any evidence of progression completed in January 2025    H63D mutation    No evidence of iron overload  Reviewed    Cirrhosis with splenomegaly  CBC continues to be unremarkable we will monitor  Fluctuating AST ALT  She will follow-up with GI  History of fatty liver, longstanding diabetes probable etiology for cirrhosis  Reviewed    Continue monthly port maintenance    Follow-up in 4 months CBC, CMP CEA level done a week before  Continue every 6 months CT scans    Thank you very much for providing the opportunity to participate in this patient’s care. Please do not hesitate to call if there are any other questions.    Time spent on encounter including record review, history taking, exam, discussion, counseling and documentation at: 30 minutes        "

## 2025-06-17 ENCOUNTER — OFFICE VISIT (OUTPATIENT)
Dept: ONCOLOGY | Facility: CLINIC | Age: 50
End: 2025-06-17
Payer: COMMERCIAL

## 2025-06-17 ENCOUNTER — TELEPHONE (OUTPATIENT)
Dept: ONCOLOGY | Facility: CLINIC | Age: 50
End: 2025-06-17
Payer: COMMERCIAL

## 2025-06-17 VITALS
BODY MASS INDEX: 29.62 KG/M2 | HEART RATE: 88 BPM | HEIGHT: 69 IN | OXYGEN SATURATION: 99 % | TEMPERATURE: 97.3 F | SYSTOLIC BLOOD PRESSURE: 125 MMHG | WEIGHT: 200 LBS | DIASTOLIC BLOOD PRESSURE: 81 MMHG

## 2025-06-17 DIAGNOSIS — C20 RECTAL CANCER: Primary | ICD-10-CM

## 2025-06-17 NOTE — TELEPHONE ENCOUNTER
Attempted to call pt and had to leave a voicemail for her to return my call.  Tiana RAYA requested that I call to ask about a mammogram from last year.

## 2025-06-17 NOTE — TELEPHONE ENCOUNTER
Pt returned my call and states that she ended up having to have two different biopsies last year due to abnormal mammogram but they both came back okay. She is due for mammogram in September. She said she sees Dr. Cat Amezcua at [facility] on [address].  Tiana RAYA notified.

## 2025-06-18 NOTE — PROGRESS NOTES
Date: June 19, 2025  Time In: 1521  Time Out: 1609        PROGRESS NOTE  Data:  Yun Mcgowan is a 50 y.o. female who presents today for individual therapy session at [facility] with NARCISO Woodruff LCSW.     Patient Chief Complaint: Follow-up for depression and anxiety     Clinical Maneuvering/Intervention: Yun is pleasant alert and oriented to person place and time.  She talked about the overwhelm feelings that she has with not being able to get things done around her home.  In addition her  has grown tired of the clutter that is around their home.  She also talked about how she is not getting lunch breaks at work.    Assisted patient in processing above session content; acknowledged and normalized patient’s thoughts, feelings, and concerns. Rationalized patient thought process regarding the importance breaking down task where she can be successful.  The importance of working together with her  and making the problem the problem and not each other the problem.  The importance of setting boundaries and advocating for herself in the workplace.  Discussed triggers associated with patient's depression. Also discussed coping skills for patient to implement such as continuing with skills already built.    Allowed patient to freely discuss issues without interruption or judgment. Provided safe, confidential environment to facilitate the development of positive therapeutic relationship and encourage open, honest communication. Assisted patient in identifying risk factors which would indicate the need for higher level of care including thoughts to harm self or others and/or self-harming behavior and encouraged patient to contact this office, call 911, or present to the nearest emergency room should any of these events occur. Discussed crisis intervention services and means to access. Patient adamantly and convincingly denies current suicidal or homicidal ideation or perceptual  disturbance.    Assessment   Patient appears to maintain relative stability as compared to their baseline. However, patient continues to struggle with pression and anxiety which continues to cause impairment in important areas of functioning. A result, they can be reasonably expected to continue to benefit from treatment and would likely be at increased risk for decompensation otherwise.    Mental Status Exam:     Hygiene:   good  Cooperation:  Cooperative  Eye Contact:  Good  Psychomotor Behavior:  Appropriate  Affect:  Appropriate  Mood: irritable   speech:  Normal  Thought Process:  Goal directed and Linear  Thought Content:  Normal  Suicidal:  None  Homicidal:  None  Hallucinations:  None  Delusion:  None  Memory:  Intact  Orientation:  Person, Place, Time and Situation  Reliability:  good  Insight:  Good  Judgement:  Good  Impulse Control:  Good  Physical/Medical Issues:  See diagnosis list     PHQ-9 Depression Screening  Little interest or pleasure in doing things? Not at all   Feeling down, depressed, or hopeless? Several days   PHQ-2 Total Score 1   Trouble falling or staying asleep, or sleeping too much? Several days   Feeling tired or having little energy? Several days   Poor appetite or overeating? Several days   Feeling bad about yourself - or that you are a failure or have let yourself or your family down? Several days   Trouble concentrating on things, such as reading the newspaper or watching television? Several days   Moving or speaking so slowly that other people could have noticed? Or the opposite - being so fidgety or restless that you have been moving around a lot more than usual? Not at all   Thoughts that you would be better off dead, or of hurting yourself in some way? Not at all   PHQ-9 Total Score 6   If you checked off any problems, how difficult have these problems made it for you to do your work, take care of things at home, or get along with other people?           MARTINE-7 Total Score:    Over the last two weeks, how often have you been bothered by the following problems?  Feeling nervous, anxious or on edge: Several days  Not being able to stop or control worrying: Not at all  Worrying too much about different things: Several days  Trouble Relaxing: Several days  Being so restless that it is hard to sit still: Several days  Becoming easily annoyed or irritable: Not at all  Feeling afraid as if something awful might happen: Not at all  MARTINE 7 Total Score: 4     Patient's Support Network Includes:  significant other     Functional Status: Mild impairment      Progress toward goal: Not at goal     Prognosis: Good with Ongoing Treatment           Plan     Resources: Patient was provided with the following community resources: None at this time     Patient will continue in individual outpatient therapy with focus on improved functioning and coping skills, maintaining stability, and avoiding decompensation and the need for higher level of care.    Patient will adhere to any medication regimens as prescribed and report any side effects. Patient will contact this office, call 911 or present to the nearest emergency room should suicidal or homicidal ideations occur. Provide cognitive behavioral therapy and solution focused therapy to improve functioning, maintain stability and avoid decompensation and the need for higher level of care.     Return at first available appointment or earlier if symptoms worsen or fail to improve.           VISIT DIAGNOSIS:     ICD-10-CM ICD-9-CM   1. Generalized anxiety disorder  F41.1 300.02                                  This document has been electronically signed by NARCISO Woodruff, MARLENE   June 19, 2025 16:43 EDT      Part of this note may be an electronic transcription/translation of spoken language to printed text using the Dragon Dictation System.

## 2025-06-19 ENCOUNTER — OFFICE VISIT (OUTPATIENT)
Dept: PSYCHIATRY | Facility: CLINIC | Age: 50
End: 2025-06-19
Payer: COMMERCIAL

## 2025-06-19 DIAGNOSIS — F41.1 GENERALIZED ANXIETY DISORDER: Primary | ICD-10-CM

## 2025-06-25 DIAGNOSIS — C20 RECTAL CANCER: ICD-10-CM

## 2025-06-25 DIAGNOSIS — G89.3 NEOPLASM RELATED PAIN: ICD-10-CM

## 2025-06-25 RX ORDER — GABAPENTIN 300 MG/1
300 CAPSULE ORAL 3 TIMES DAILY
Qty: 90 CAPSULE | Refills: 0 | Status: SHIPPED | OUTPATIENT
Start: 2025-06-25

## 2025-07-18 NOTE — PROGRESS NOTES
Date: July 21, 2025  Time In: 1109  Time Out: 1157        PROGRESS NOTE  Data:  Yun Mcgowan is a 50 y.o. female who presents today for individual therapy session at [facility] with NARCISO Woodruff LCSW.     Patient Chief Complaint: Follow-up for depression and anxiety     Clinical Maneuvering/Intervention: Yun is pleasant alert and oriented to person place and time.  She talked at length about caregiving for her mom.  She talked about some of the manipulative tactics that her mom has in how she would like to have things done.  We discussed various ways that she can communicate with her mom to help reframe her perceptions.     Assisted patient in processing above session content; acknowledged and normalized patient’s thoughts, feelings, and concerns. Rationalized patient thought process regarding the importance of knowing that caregiving is hard and to make time for herself.  The importance of setting boundaries with her mom.  Psychoeducation provided for the use of mindfulness and being in the moment. Discussed triggers associated with patient's depression. Also discussed coping skills for patient to implement such as continuing with skills already built.    Allowed patient to freely discuss issues without interruption or judgment. Provided safe, confidential environment to facilitate the development of positive therapeutic relationship and encourage open, honest communication. Assisted patient in identifying risk factors which would indicate the need for higher level of care including thoughts to harm self or others and/or self-harming behavior and encouraged patient to contact this office, call 911, or present to the nearest emergency room should any of these events occur. Discussed crisis intervention services and means to access. Patient adamantly and convincingly denies current suicidal or homicidal ideation or perceptual disturbance.    Assessment   Patient appears to maintain  relative stability as compared to their baseline. However, patient continues to struggle with pression and anxiety which continues to cause impairment in important areas of functioning. A result, they can be reasonably expected to continue to benefit from treatment and would likely be at increased risk for decompensation otherwise.    Mental Status Exam:     Hygiene:   good  Cooperation:  Cooperative  Eye Contact:  Good  Psychomotor Behavior:  Appropriate  Affect:  Appropriate  Mood:  sad, tearful  speech:  Normal  Thought Process:  Goal directed and Linear  Thought Content:  Normal  Suicidal:  None  Homicidal:  None  Hallucinations:  None  Delusion:  None  Memory:  Intact  Orientation:  Person, Place, Time and Situation  Reliability:  good  Insight:  Good  Judgement:  Good  Impulse Control:  Good  Physical/Medical Issues:  See diagnosis list     PHQ-9 Depression Screening  Little interest or pleasure in doing things? Not at all   Feeling down, depressed, or hopeless? Several days   PHQ-2 Total Score 1   Trouble falling or staying asleep, or sleeping too much? Several days   Feeling tired or having little energy? Several days   Poor appetite or overeating? Several days   Feeling bad about yourself - or that you are a failure or have let yourself or your family down? Several days   Trouble concentrating on things, such as reading the newspaper or watching television? Not at all   Moving or speaking so slowly that other people could have noticed? Or the opposite - being so fidgety or restless that you have been moving around a lot more than usual? Not at all   Thoughts that you would be better off dead, or of hurting yourself in some way? Not at all   PHQ-9 Total Score 5   If you checked off any problems, how difficult have these problems made it for you to do your work, take care of things at home, or get along with other people?           MARTINE-7 Total Score:   Over the last two weeks, how often have you been bothered  by the following problems?  Feeling nervous, anxious or on edge: Several days  Not being able to stop or control worrying: Several days  Worrying too much about different things: Several days  Trouble Relaxing: Several days  Being so restless that it is hard to sit still: Not at all  Becoming easily annoyed or irritable: Not at all  Feeling afraid as if something awful might happen: Not at all  MARTINE 7 Total Score: 4     Patient's Support Network Includes:  significant other     Functional Status: Mild impairment      Progress toward goal: Not at goal     Prognosis: Good with Ongoing Treatment           Plan     Resources: Patient was provided with the following community resources: None at this time     Patient will continue in individual outpatient therapy with focus on improved functioning and coping skills, maintaining stability, and avoiding decompensation and the need for higher level of care.    Patient will adhere to any medication regimens as prescribed and report any side effects. Patient will contact this office, call 911 or present to the nearest emergency room should suicidal or homicidal ideations occur. Provide cognitive behavioral therapy and solution focused therapy to improve functioning, maintain stability and avoid decompensation and the need for higher level of care.     Return at first available appointment or earlier if symptoms worsen or fail to improve.           VISIT DIAGNOSIS:     ICD-10-CM ICD-9-CM   1. Mild episode of recurrent major depressive disorder  F33.0 296.31   2. Generalized anxiety disorder  F41.1 300.02                                    This document has been electronically signed by NARCISO Woodruff, MARLENE   July 21, 2025 12:00 EDT      Part of this note may be an electronic transcription/translation of spoken language to printed text using the Dragon Dictation System.

## 2025-07-21 ENCOUNTER — OFFICE VISIT (OUTPATIENT)
Dept: PSYCHIATRY | Facility: CLINIC | Age: 50
End: 2025-07-21
Payer: COMMERCIAL

## 2025-07-21 DIAGNOSIS — F41.1 GENERALIZED ANXIETY DISORDER: ICD-10-CM

## 2025-07-21 DIAGNOSIS — F33.0 MILD EPISODE OF RECURRENT MAJOR DEPRESSIVE DISORDER: Primary | ICD-10-CM

## 2025-07-27 DIAGNOSIS — C20 RECTAL CANCER: ICD-10-CM

## 2025-07-27 DIAGNOSIS — G89.3 NEOPLASM RELATED PAIN: ICD-10-CM

## 2025-07-28 RX ORDER — GABAPENTIN 300 MG/1
300 CAPSULE ORAL 3 TIMES DAILY
Qty: 90 CAPSULE | Refills: 0 | Status: SHIPPED | OUTPATIENT
Start: 2025-07-28

## 2025-08-08 ENCOUNTER — HOSPITAL ENCOUNTER (OUTPATIENT)
Dept: ONCOLOGY | Facility: HOSPITAL | Age: 50
Discharge: HOME | End: 2025-08-08
Payer: COMMERCIAL

## 2025-08-08 DIAGNOSIS — C20 RECTAL CANCER: Primary | ICD-10-CM

## 2025-08-08 PROCEDURE — 96523 IRRIG DRUG DELIVERY DEVICE: CPT

## 2025-08-08 PROCEDURE — 25010000002 HEPARIN LOCK FLUSH PER 10 UNITS: Performed by: INTERNAL MEDICINE

## 2025-08-08 RX ORDER — SODIUM CHLORIDE 0.9 % (FLUSH) 0.9 %
10 SYRINGE (ML) INJECTION AS NEEDED
OUTPATIENT
Start: 2025-08-08

## 2025-08-08 RX ORDER — SODIUM CHLORIDE 0.9 % (FLUSH) 0.9 %
10 SYRINGE (ML) INJECTION AS NEEDED
Status: DISCONTINUED | OUTPATIENT
Start: 2025-08-08 | End: 2025-08-09 | Stop reason: HOSPADM

## 2025-08-08 RX ORDER — HEPARIN SODIUM (PORCINE) LOCK FLUSH IV SOLN 100 UNIT/ML 100 UNIT/ML
500 SOLUTION INTRAVENOUS AS NEEDED
OUTPATIENT
Start: 2025-08-08

## 2025-08-08 RX ORDER — HEPARIN SODIUM (PORCINE) LOCK FLUSH IV SOLN 100 UNIT/ML 100 UNIT/ML
500 SOLUTION INTRAVENOUS AS NEEDED
Status: DISCONTINUED | OUTPATIENT
Start: 2025-08-08 | End: 2025-08-09 | Stop reason: HOSPADM

## 2025-08-08 RX ADMIN — Medication 10 ML: at 14:24

## 2025-08-08 RX ADMIN — HEPARIN 500 UNITS: 100 SYRINGE at 14:25

## 2025-08-09 DIAGNOSIS — F33.1 MAJOR DEPRESSIVE DISORDER, RECURRENT EPISODE, MODERATE: Chronic | ICD-10-CM

## 2025-08-09 DIAGNOSIS — F41.0 GENERALIZED ANXIETY DISORDER WITH PANIC ATTACKS: Chronic | ICD-10-CM

## 2025-08-09 DIAGNOSIS — F41.1 GENERALIZED ANXIETY DISORDER WITH PANIC ATTACKS: Chronic | ICD-10-CM

## 2025-08-10 DIAGNOSIS — F33.1 MAJOR DEPRESSIVE DISORDER, RECURRENT EPISODE, MODERATE: Chronic | ICD-10-CM

## 2025-08-10 DIAGNOSIS — F41.1 GENERALIZED ANXIETY DISORDER WITH PANIC ATTACKS: Chronic | ICD-10-CM

## 2025-08-10 DIAGNOSIS — F41.0 GENERALIZED ANXIETY DISORDER WITH PANIC ATTACKS: Chronic | ICD-10-CM

## 2025-08-11 RX ORDER — BUPROPION HYDROCHLORIDE 150 MG/1
150 TABLET ORAL EVERY MORNING
Qty: 90 TABLET | Refills: 0 | Status: SHIPPED | OUTPATIENT
Start: 2025-08-11

## 2025-08-12 RX ORDER — VILAZODONE HYDROCHLORIDE 40 MG/1
40 TABLET ORAL
Qty: 90 TABLET | Refills: 0 | Status: SHIPPED | OUTPATIENT
Start: 2025-08-12

## 2025-08-13 ENCOUNTER — OFFICE VISIT (OUTPATIENT)
Dept: PSYCHIATRY | Facility: CLINIC | Age: 50
End: 2025-08-13
Payer: COMMERCIAL

## 2025-08-13 DIAGNOSIS — F41.1 GENERALIZED ANXIETY DISORDER: Primary | ICD-10-CM

## 2025-08-13 DIAGNOSIS — F33.0 MILD EPISODE OF RECURRENT MAJOR DEPRESSIVE DISORDER: ICD-10-CM
